# Patient Record
Sex: FEMALE | Race: WHITE | NOT HISPANIC OR LATINO
[De-identification: names, ages, dates, MRNs, and addresses within clinical notes are randomized per-mention and may not be internally consistent; named-entity substitution may affect disease eponyms.]

---

## 2017-05-14 ENCOUNTER — RECORD ABSTRACTING (OUTPATIENT)
Age: 72
End: 2017-05-14

## 2017-05-14 DIAGNOSIS — Z80.42 FAMILY HISTORY OF MALIGNANT NEOPLASM OF PROSTATE: ICD-10-CM

## 2017-05-14 DIAGNOSIS — Z92.89 PERSONAL HISTORY OF OTHER MEDICAL TREATMENT: ICD-10-CM

## 2017-05-15 ENCOUNTER — APPOINTMENT (OUTPATIENT)
Dept: INTERNAL MEDICINE | Facility: CLINIC | Age: 72
End: 2017-05-15

## 2017-05-15 ENCOUNTER — NON-APPOINTMENT (OUTPATIENT)
Age: 72
End: 2017-05-15

## 2017-05-15 VITALS
SYSTOLIC BLOOD PRESSURE: 126 MMHG | OXYGEN SATURATION: 97 % | WEIGHT: 178 LBS | HEIGHT: 64 IN | TEMPERATURE: 98.6 F | HEART RATE: 70 BPM | DIASTOLIC BLOOD PRESSURE: 80 MMHG | RESPIRATION RATE: 18 BRPM | BODY MASS INDEX: 30.39 KG/M2

## 2017-05-24 ENCOUNTER — APPOINTMENT (OUTPATIENT)
Dept: DERMATOLOGY | Facility: CLINIC | Age: 72
End: 2017-05-24

## 2017-05-24 VITALS — HEIGHT: 64 IN | WEIGHT: 175 LBS | BODY MASS INDEX: 29.88 KG/M2

## 2017-07-06 ENCOUNTER — MEDICATION RENEWAL (OUTPATIENT)
Age: 72
End: 2017-07-06

## 2017-07-06 DIAGNOSIS — M19.90 UNSPECIFIED OSTEOARTHRITIS, UNSPECIFIED SITE: ICD-10-CM

## 2017-07-25 ENCOUNTER — TRANSCRIPTION ENCOUNTER (OUTPATIENT)
Age: 72
End: 2017-07-25

## 2017-08-21 ENCOUNTER — OUTPATIENT (OUTPATIENT)
Dept: OUTPATIENT SERVICES | Facility: HOSPITAL | Age: 72
LOS: 1 days | End: 2017-08-21
Payer: MEDICARE

## 2017-08-21 ENCOUNTER — APPOINTMENT (OUTPATIENT)
Dept: MAMMOGRAPHY | Facility: CLINIC | Age: 72
End: 2017-08-21
Payer: MEDICARE

## 2017-08-21 DIAGNOSIS — Z00.8 ENCOUNTER FOR OTHER GENERAL EXAMINATION: ICD-10-CM

## 2017-08-21 PROCEDURE — 77067 SCR MAMMO BI INCL CAD: CPT

## 2017-08-21 PROCEDURE — 77063 BREAST TOMOSYNTHESIS BI: CPT

## 2017-08-21 PROCEDURE — G0202: CPT | Mod: 26

## 2017-08-21 PROCEDURE — 77063 BREAST TOMOSYNTHESIS BI: CPT | Mod: 26

## 2017-09-19 ENCOUNTER — TRANSCRIPTION ENCOUNTER (OUTPATIENT)
Age: 72
End: 2017-09-19

## 2017-09-21 ENCOUNTER — TRANSCRIPTION ENCOUNTER (OUTPATIENT)
Age: 72
End: 2017-09-21

## 2017-10-12 ENCOUNTER — RX RENEWAL (OUTPATIENT)
Age: 72
End: 2017-10-12

## 2017-10-13 ENCOUNTER — OTHER (OUTPATIENT)
Age: 72
End: 2017-10-13

## 2017-10-16 ENCOUNTER — NON-APPOINTMENT (OUTPATIENT)
Age: 72
End: 2017-10-16

## 2017-10-16 ENCOUNTER — APPOINTMENT (OUTPATIENT)
Dept: INTERNAL MEDICINE | Facility: CLINIC | Age: 72
End: 2017-10-16
Payer: MEDICARE

## 2017-10-16 VITALS
TEMPERATURE: 98.3 F | RESPIRATION RATE: 18 BRPM | BODY MASS INDEX: 29.02 KG/M2 | OXYGEN SATURATION: 98 % | WEIGHT: 170 LBS | HEART RATE: 86 BPM | DIASTOLIC BLOOD PRESSURE: 68 MMHG | SYSTOLIC BLOOD PRESSURE: 126 MMHG | HEIGHT: 64 IN

## 2017-10-16 PROCEDURE — 99214 OFFICE O/P EST MOD 30 MIN: CPT | Mod: 25

## 2017-10-16 PROCEDURE — 94010 BREATHING CAPACITY TEST: CPT

## 2017-12-05 LAB
CHOLEST SERPL-MCNC: 159
GLUCOSE SERPL-MCNC: 89
HBA1C MFR BLD HPLC: 5.5
HDLC SERPL-MCNC: 55
LDLC SERPL CALC-MCNC: 79

## 2017-12-20 ENCOUNTER — TRANSCRIPTION ENCOUNTER (OUTPATIENT)
Age: 72
End: 2017-12-20

## 2017-12-20 ENCOUNTER — RX RENEWAL (OUTPATIENT)
Age: 72
End: 2017-12-20

## 2017-12-23 ENCOUNTER — TRANSCRIPTION ENCOUNTER (OUTPATIENT)
Age: 72
End: 2017-12-23

## 2017-12-26 ENCOUNTER — RX RENEWAL (OUTPATIENT)
Age: 72
End: 2017-12-26

## 2018-03-26 ENCOUNTER — RX RENEWAL (OUTPATIENT)
Age: 73
End: 2018-03-26

## 2018-04-24 ENCOUNTER — RX RENEWAL (OUTPATIENT)
Age: 73
End: 2018-04-24

## 2018-05-02 ENCOUNTER — APPOINTMENT (OUTPATIENT)
Dept: DERMATOLOGY | Facility: CLINIC | Age: 73
End: 2018-05-02
Payer: MEDICARE

## 2018-05-02 VITALS — BODY MASS INDEX: 30.39 KG/M2 | HEIGHT: 64 IN | WEIGHT: 178 LBS

## 2018-05-02 PROCEDURE — 99213 OFFICE O/P EST LOW 20 MIN: CPT

## 2018-05-02 RX ORDER — LEVOTHYROXINE SODIUM 0.07 MG/1
75 TABLET ORAL DAILY
Qty: 90 | Refills: 0 | Status: DISCONTINUED | COMMUNITY
End: 2018-05-02

## 2018-05-07 ENCOUNTER — APPOINTMENT (OUTPATIENT)
Dept: DERMATOLOGY | Facility: CLINIC | Age: 73
End: 2018-05-07

## 2018-05-08 ENCOUNTER — TRANSCRIPTION ENCOUNTER (OUTPATIENT)
Age: 73
End: 2018-05-08

## 2018-05-14 ENCOUNTER — APPOINTMENT (OUTPATIENT)
Dept: DERMATOLOGY | Facility: CLINIC | Age: 73
End: 2018-05-14

## 2018-05-29 ENCOUNTER — LABORATORY RESULT (OUTPATIENT)
Age: 73
End: 2018-05-29

## 2018-05-29 ENCOUNTER — NON-APPOINTMENT (OUTPATIENT)
Age: 73
End: 2018-05-29

## 2018-05-29 ENCOUNTER — APPOINTMENT (OUTPATIENT)
Dept: INTERNAL MEDICINE | Facility: CLINIC | Age: 73
End: 2018-05-29
Payer: MEDICARE

## 2018-05-29 VITALS
OXYGEN SATURATION: 97 % | BODY MASS INDEX: 29.36 KG/M2 | HEART RATE: 74 BPM | TEMPERATURE: 98.9 F | DIASTOLIC BLOOD PRESSURE: 84 MMHG | SYSTOLIC BLOOD PRESSURE: 126 MMHG | HEIGHT: 64 IN | WEIGHT: 171.98 LBS | RESPIRATION RATE: 16 BRPM

## 2018-05-29 PROCEDURE — G0009: CPT

## 2018-05-29 PROCEDURE — 90732 PPSV23 VACC 2 YRS+ SUBQ/IM: CPT

## 2018-05-29 PROCEDURE — 94060 EVALUATION OF WHEEZING: CPT

## 2018-05-29 PROCEDURE — 99214 OFFICE O/P EST MOD 30 MIN: CPT | Mod: 25

## 2018-06-19 ENCOUNTER — RESULT REVIEW (OUTPATIENT)
Age: 73
End: 2018-06-19

## 2018-06-30 ENCOUNTER — LABORATORY RESULT (OUTPATIENT)
Age: 73
End: 2018-06-30

## 2018-07-02 LAB
APPEARANCE: CLEAR
BACTERIA UR CULT: NORMAL
BACTERIA: NEGATIVE
BILIRUBIN URINE: NEGATIVE
BLOOD URINE: NEGATIVE
COLOR: YELLOW
GLUCOSE QUALITATIVE U: NEGATIVE MG/DL
HYALINE CASTS: 1 /LPF
KETONES URINE: NEGATIVE
LEUKOCYTE ESTERASE URINE: NEGATIVE
MICROSCOPIC-UA: NORMAL
NITRITE URINE: NEGATIVE
PH URINE: 6.5
PROTEIN URINE: NEGATIVE MG/DL
RED BLOOD CELLS URINE: 2 /HPF
SPECIFIC GRAVITY URINE: 1.01
SQUAMOUS EPITHELIAL CELLS: 0 /HPF
UROBILINOGEN URINE: NEGATIVE MG/DL
WHITE BLOOD CELLS URINE: 0 /HPF

## 2018-07-03 ENCOUNTER — RX RENEWAL (OUTPATIENT)
Age: 73
End: 2018-07-03

## 2018-07-03 RX ORDER — ATORVASTATIN CALCIUM 20 MG/1
20 TABLET, FILM COATED ORAL
Qty: 90 | Refills: 0 | Status: DISCONTINUED | COMMUNITY
Start: 2017-12-20 | End: 2018-07-03

## 2018-07-03 RX ORDER — UBIDECARENONE 200 MG
200 CAPSULE ORAL
Refills: 0 | Status: ACTIVE | COMMUNITY

## 2018-08-02 ENCOUNTER — OUTPATIENT (OUTPATIENT)
Dept: OUTPATIENT SERVICES | Facility: HOSPITAL | Age: 73
LOS: 1 days | End: 2018-08-02
Payer: MEDICARE

## 2018-08-02 ENCOUNTER — APPOINTMENT (OUTPATIENT)
Dept: ULTRASOUND IMAGING | Facility: CLINIC | Age: 73
End: 2018-08-02
Payer: MEDICARE

## 2018-08-02 DIAGNOSIS — Z00.8 ENCOUNTER FOR OTHER GENERAL EXAMINATION: ICD-10-CM

## 2018-08-02 PROCEDURE — 76700 US EXAM ABDOM COMPLETE: CPT | Mod: 26

## 2018-08-02 PROCEDURE — 76700 US EXAM ABDOM COMPLETE: CPT

## 2018-08-08 LAB
ALBUMIN SERPL ELPH-MCNC: 4.4 G/DL
ALP BLD-CCNC: 72 U/L
ALT SERPL-CCNC: 19 U/L
AST SERPL-CCNC: 22 U/L
BILIRUB DIRECT SERPL-MCNC: 0.1 MG/DL
BILIRUB INDIRECT SERPL-MCNC: 0.3 MG/DL
BILIRUB SERPL-MCNC: 0.4 MG/DL
CHOLEST SERPL-MCNC: 160 MG/DL
CHOLEST/HDLC SERPL: 3.1 RATIO
HDLC SERPL-MCNC: 52 MG/DL
LDLC SERPL CALC-MCNC: 79 MG/DL
PROT SERPL-MCNC: 7.4 G/DL
TRIGL SERPL-MCNC: 145 MG/DL

## 2018-08-20 ENCOUNTER — RX RENEWAL (OUTPATIENT)
Age: 73
End: 2018-08-20

## 2018-08-24 ENCOUNTER — OUTPATIENT (OUTPATIENT)
Dept: OUTPATIENT SERVICES | Facility: HOSPITAL | Age: 73
LOS: 1 days | End: 2018-08-24
Payer: MEDICARE

## 2018-08-24 ENCOUNTER — APPOINTMENT (OUTPATIENT)
Dept: MAMMOGRAPHY | Facility: CLINIC | Age: 73
End: 2018-08-24
Payer: MEDICARE

## 2018-08-24 DIAGNOSIS — Z00.8 ENCOUNTER FOR OTHER GENERAL EXAMINATION: ICD-10-CM

## 2018-08-24 PROCEDURE — 77063 BREAST TOMOSYNTHESIS BI: CPT

## 2018-08-24 PROCEDURE — 77067 SCR MAMMO BI INCL CAD: CPT | Mod: 26

## 2018-08-24 PROCEDURE — 77067 SCR MAMMO BI INCL CAD: CPT

## 2018-08-24 PROCEDURE — 77063 BREAST TOMOSYNTHESIS BI: CPT | Mod: 26

## 2018-08-29 ENCOUNTER — TRANSCRIPTION ENCOUNTER (OUTPATIENT)
Age: 73
End: 2018-08-29

## 2018-09-06 ENCOUNTER — APPOINTMENT (OUTPATIENT)
Dept: UROLOGY | Facility: CLINIC | Age: 73
End: 2018-09-06
Payer: MEDICARE

## 2018-09-06 ENCOUNTER — FORM ENCOUNTER (OUTPATIENT)
Age: 73
End: 2018-09-06

## 2018-09-06 VITALS
WEIGHT: 173 LBS | TEMPERATURE: 98.7 F | DIASTOLIC BLOOD PRESSURE: 90 MMHG | HEART RATE: 85 BPM | HEIGHT: 64 IN | BODY MASS INDEX: 29.53 KG/M2 | SYSTOLIC BLOOD PRESSURE: 166 MMHG

## 2018-09-06 DIAGNOSIS — Z63.4 DISAPPEARANCE AND DEATH OF FAMILY MEMBER: ICD-10-CM

## 2018-09-06 PROCEDURE — 99204 OFFICE O/P NEW MOD 45 MIN: CPT

## 2018-09-06 SDOH — SOCIAL STABILITY - SOCIAL INSECURITY: DISSAPEARANCE AND DEATH OF FAMILY MEMBER: Z63.4

## 2018-09-07 ENCOUNTER — OUTPATIENT (OUTPATIENT)
Dept: OUTPATIENT SERVICES | Facility: HOSPITAL | Age: 73
LOS: 1 days | End: 2018-09-07
Payer: MEDICARE

## 2018-09-07 ENCOUNTER — APPOINTMENT (OUTPATIENT)
Dept: CT IMAGING | Facility: CLINIC | Age: 73
End: 2018-09-07
Payer: MEDICARE

## 2018-09-07 DIAGNOSIS — Z00.8 ENCOUNTER FOR OTHER GENERAL EXAMINATION: ICD-10-CM

## 2018-09-07 LAB
APPEARANCE: CLEAR
BACTERIA: NEGATIVE
BILIRUBIN URINE: NEGATIVE
BLOOD URINE: NEGATIVE
COLOR: YELLOW
GLUCOSE QUALITATIVE U: NEGATIVE MG/DL
KETONES URINE: NEGATIVE
LEUKOCYTE ESTERASE URINE: ABNORMAL
MICROSCOPIC-UA: NORMAL
NITRITE URINE: NEGATIVE
PH URINE: 5.5
PROTEIN URINE: NEGATIVE MG/DL
RED BLOOD CELLS URINE: 0 /HPF
SPECIFIC GRAVITY URINE: 1.01
SQUAMOUS EPITHELIAL CELLS: 2 /HPF
UROBILINOGEN URINE: NEGATIVE MG/DL
WHITE BLOOD CELLS URINE: 2 /HPF

## 2018-09-07 PROCEDURE — 74178 CT ABD&PLV WO CNTR FLWD CNTR: CPT

## 2018-09-07 PROCEDURE — 82565 ASSAY OF CREATININE: CPT

## 2018-09-07 PROCEDURE — 74178 CT ABD&PLV WO CNTR FLWD CNTR: CPT | Mod: 26

## 2018-09-09 PROBLEM — Z63.4 WIDOWED: Status: ACTIVE | Noted: 2018-09-06

## 2018-09-09 RX ORDER — PHENAZOPYRIDINE HYDROCHLORIDE 200 MG/1
200 TABLET ORAL
Qty: 6 | Refills: 0 | Status: COMPLETED | COMMUNITY
Start: 2018-08-29

## 2018-09-09 RX ORDER — NITROFURANTOIN (MONOHYDRATE/MACROCRYSTALS) 25; 75 MG/1; MG/1
100 CAPSULE ORAL
Qty: 14 | Refills: 0 | Status: COMPLETED | COMMUNITY
Start: 2018-08-29

## 2018-09-09 RX ORDER — SODIUM SULFATE, POTASSIUM SULFATE, MAGNESIUM SULFATE 17.5; 3.13; 1.6 G/ML; G/ML; G/ML
17.5-3.13-1.6 SOLUTION, CONCENTRATE ORAL
Qty: 354 | Refills: 0 | Status: COMPLETED | COMMUNITY
Start: 2018-07-25

## 2018-09-11 ENCOUNTER — CHART COPY (OUTPATIENT)
Age: 73
End: 2018-09-11

## 2018-09-11 LAB — BACTERIA UR CULT: NORMAL

## 2018-09-24 ENCOUNTER — APPOINTMENT (OUTPATIENT)
Dept: UROLOGY | Facility: CLINIC | Age: 73
End: 2018-09-24
Payer: MEDICARE

## 2018-09-24 VITALS
SYSTOLIC BLOOD PRESSURE: 152 MMHG | OXYGEN SATURATION: 100 % | WEIGHT: 170 LBS | HEART RATE: 84 BPM | HEIGHT: 64 IN | BODY MASS INDEX: 29.02 KG/M2 | DIASTOLIC BLOOD PRESSURE: 79 MMHG

## 2018-09-24 PROCEDURE — 52000 CYSTOURETHROSCOPY: CPT

## 2018-11-19 ENCOUNTER — RX RENEWAL (OUTPATIENT)
Age: 73
End: 2018-11-19

## 2018-12-01 ENCOUNTER — LABORATORY RESULT (OUTPATIENT)
Age: 73
End: 2018-12-01

## 2018-12-05 ENCOUNTER — APPOINTMENT (OUTPATIENT)
Dept: RHEUMATOLOGY | Facility: CLINIC | Age: 73
End: 2018-12-05
Payer: MEDICARE

## 2018-12-05 VITALS
OXYGEN SATURATION: 99 % | WEIGHT: 170 LBS | BODY MASS INDEX: 29.02 KG/M2 | DIASTOLIC BLOOD PRESSURE: 80 MMHG | SYSTOLIC BLOOD PRESSURE: 130 MMHG | HEART RATE: 75 BPM | HEIGHT: 64 IN

## 2018-12-05 PROCEDURE — 99204 OFFICE O/P NEW MOD 45 MIN: CPT

## 2018-12-06 LAB
C3 SERPL-MCNC: 150 MG/DL
C4 SERPL-MCNC: 69 MG/DL
CCP AB SER IA-ACNC: <8 UNITS
CRP SERPL-MCNC: 0.26 MG/DL
DSDNA AB SER-ACNC: <12 IU/ML
ENA RNP AB SER IA-ACNC: <0.2 AL
ENA SM AB SER IA-ACNC: <0.2 AL
ENA SS-A AB SER IA-ACNC: <0.2 AL
ENA SS-B AB SER IA-ACNC: <0.2 AL
ERYTHROCYTE [SEDIMENTATION RATE] IN BLOOD BY WESTERGREN METHOD: 12 MM/HR
RF+CCP IGG SER-IMP: NEGATIVE
RHEUMATOID FACT SER QL: <10 IU/ML
THYROGLOB AB SERPL-ACNC: <20 IU/ML
THYROPEROXIDASE AB SERPL IA-ACNC: 22.6 IU/ML

## 2018-12-09 LAB
ANA PAT FLD IF-IMP: ABNORMAL
ANA SER IF-ACNC: ABNORMAL

## 2018-12-10 ENCOUNTER — APPOINTMENT (OUTPATIENT)
Dept: INTERNAL MEDICINE | Facility: CLINIC | Age: 73
End: 2018-12-10

## 2018-12-10 LAB
APPEARANCE: CLEAR
BACTERIA UR CULT: NORMAL
BACTERIA: NEGATIVE
BILIRUBIN URINE: NEGATIVE
BLOOD URINE: NEGATIVE
COLOR: YELLOW
GLUCOSE QUALITATIVE U: NEGATIVE MG/DL
HYALINE CASTS: 4 /LPF
KETONES URINE: NEGATIVE
LEUKOCYTE ESTERASE URINE: ABNORMAL
MICROSCOPIC-UA: NORMAL
NITRITE URINE: NEGATIVE
PH URINE: 5.5
PROTEIN URINE: NEGATIVE MG/DL
RED BLOOD CELLS URINE: 5 /HPF
SPECIFIC GRAVITY URINE: 1.02
SQUAMOUS EPITHELIAL CELLS: 4 /HPF
UROBILINOGEN URINE: NEGATIVE MG/DL
WHITE BLOOD CELLS URINE: 13 /HPF

## 2018-12-13 ENCOUNTER — APPOINTMENT (OUTPATIENT)
Dept: INTERNAL MEDICINE | Facility: CLINIC | Age: 73
End: 2018-12-13
Payer: MEDICARE

## 2018-12-13 VITALS
SYSTOLIC BLOOD PRESSURE: 140 MMHG | HEART RATE: 82 BPM | TEMPERATURE: 98.7 F | RESPIRATION RATE: 16 BRPM | DIASTOLIC BLOOD PRESSURE: 70 MMHG | HEIGHT: 64 IN | WEIGHT: 168 LBS | BODY MASS INDEX: 28.68 KG/M2 | OXYGEN SATURATION: 98 %

## 2018-12-13 PROCEDURE — 99214 OFFICE O/P EST MOD 30 MIN: CPT

## 2018-12-13 RX ORDER — MELOXICAM 7.5 MG/1
7.5 TABLET ORAL DAILY
Qty: 90 | Refills: 1 | Status: DISCONTINUED | COMMUNITY
Start: 2017-12-20 | End: 2018-12-13

## 2018-12-13 RX ORDER — LEVOTHYROXINE SODIUM 75 UG/1
75 TABLET ORAL
Refills: 0 | Status: DISCONTINUED | COMMUNITY
End: 2018-12-13

## 2018-12-13 NOTE — HISTORY OF PRESENT ILLNESS
[FreeTextEntry1] : Followup evaluation [de-identified] : Mrs. royal presents for followup evaluation. She is feeling well. She recently had extensive blood work by rheumatology. Previous comprehensive blood profile did show evidence of asymptomatic pyuria. Urine culture was negative. Mrs. royal did have a urology evaluation by Dr. Aguila which was negative. She has no chest pain, shortness of breath or palpitations. There were no nocturnal symptoms of cough or dyspnea. She has already received the flu shot.

## 2018-12-13 NOTE — PLAN
[FreeTextEntry1] : Mrs. royal presents for a followup evaluation. She does have asymptomatic pyuria. I will discuss this with Dr. Aguila, however, at present she will not require treatment. Neurologic workup was essentially negative and was reviewed with the patient. She will continue on her current medication regimen. Followup in 6 months with repeat lipid profile, TSH level and comprehensive metabolic profile. Prescriptions can be renewed for a [facility] in [place] as needed.

## 2018-12-21 ENCOUNTER — TRANSCRIPTION ENCOUNTER (OUTPATIENT)
Age: 73
End: 2018-12-21

## 2019-02-14 ENCOUNTER — MEDICATION RENEWAL (OUTPATIENT)
Age: 74
End: 2019-02-14

## 2019-02-19 ENCOUNTER — RX RENEWAL (OUTPATIENT)
Age: 74
End: 2019-02-19

## 2019-03-01 ENCOUNTER — TRANSCRIPTION ENCOUNTER (OUTPATIENT)
Age: 74
End: 2019-03-01

## 2019-05-06 ENCOUNTER — MEDICATION RENEWAL (OUTPATIENT)
Age: 74
End: 2019-05-06

## 2019-05-08 ENCOUNTER — TRANSCRIPTION ENCOUNTER (OUTPATIENT)
Age: 74
End: 2019-05-08

## 2019-05-16 ENCOUNTER — LABORATORY RESULT (OUTPATIENT)
Age: 74
End: 2019-05-16

## 2019-05-20 ENCOUNTER — APPOINTMENT (OUTPATIENT)
Dept: UROLOGY | Facility: CLINIC | Age: 74
End: 2019-05-20
Payer: MEDICARE

## 2019-05-20 VITALS
HEIGHT: 64 IN | DIASTOLIC BLOOD PRESSURE: 80 MMHG | WEIGHT: 168 LBS | OXYGEN SATURATION: 98 % | TEMPERATURE: 98.6 F | HEART RATE: 84 BPM | SYSTOLIC BLOOD PRESSURE: 147 MMHG | BODY MASS INDEX: 28.68 KG/M2

## 2019-05-20 PROCEDURE — 99213 OFFICE O/P EST LOW 20 MIN: CPT

## 2019-05-21 LAB
APPEARANCE: CLEAR
BACTERIA: NEGATIVE
BILIRUBIN URINE: NEGATIVE
BLOOD URINE: NEGATIVE
COLOR: COLORLESS
GLUCOSE QUALITATIVE U: NEGATIVE
HYALINE CASTS: 0 /LPF
KETONES URINE: NEGATIVE
LEUKOCYTE ESTERASE URINE: ABNORMAL
MICROSCOPIC-UA: NORMAL
NITRITE URINE: NEGATIVE
PH URINE: 5.5
PROTEIN URINE: NEGATIVE
RED BLOOD CELLS URINE: 1 /HPF
SPECIFIC GRAVITY URINE: 1.01
SQUAMOUS EPITHELIAL CELLS: 1 /HPF
UROBILINOGEN URINE: NORMAL
WHITE BLOOD CELLS URINE: 3 /HPF

## 2019-05-21 RX ORDER — METRONIDAZOLE 500 MG/1
500 TABLET ORAL
Qty: 30 | Refills: 0 | Status: COMPLETED | COMMUNITY
Start: 2018-12-20

## 2019-05-21 RX ORDER — SULFAMETHOXAZOLE AND TRIMETHOPRIM 800; 160 MG/1; MG/1
800-160 TABLET ORAL
Qty: 10 | Refills: 0 | Status: COMPLETED | COMMUNITY
Start: 2019-04-16

## 2019-05-21 NOTE — PROCEDURE
[Cystoscopy] : cystoscopy [Patient] : the patient [Consent Obtained] : written consent was obtained prior to the procedure and is detailed in the patient's record [Last Aspirin Dose ____] : Reviewed last aspirin dose: [unfilled] [Allergies Reviewed] : Allergies reviewed [Pt took necessary Preparations and Antibiotics for Procedure] : pt took necessary preparations and antibiotics for procedure [Valves/Prosthetics ___] : Pt has the following valves and/or prosthetics: [unfilled] [Time Patient Entered Room ___] : patient entered room: [unfilled] [Time Out ___] : time out occurred at [unfilled] as per policy [Procedure Start Time ___] : procedure start time: [unfilled] [Procedure Stop Time ___] : procedure stop time: [unfilled] [Time Patient Exited Room ___] : patient exited room: [unfilled] [Supine] : supine [Betadine] : with betadine [Intraurethral 2% Lidocaine Gel ___ (cc)] : [unfilled] cc of 2% Lidocaine Gel was administered intraurethrally [Flexible Cystoscope] : A flexible cystoscope was used to visualize the urethra and bladder. [Normal] : was normal [No Bladder Tumor] : No bladder tumor visualized [Instrumented] : an instrumented [Cytology] : cytology [No Complications] : There were no complications [Tolerated Well] : the patient tolerated the procedure well [] : not  present in the bladder wall [de-identified] : no trabeculation

## 2019-05-21 NOTE — HISTORY OF PRESENT ILLNESS
[FreeTextEntry1] : 73 year old woman with complaint of gross hematuria. She has been told she had blood on urinalyses for years, but a week ago she had pink urine.  It is mild in severity as it resolved on its own. Nothing makes the symptoms better, nothing makes sx worse. It is associated with nothing.\par No current hematuria, no dysuria, no frequency, no urgency, no hesitancy, no straining. No incontinence. \par No fevers, no chills, no nausea, no vomiting, no flank pain. No family history contributory to hematuria.\par \par 05/20/2019: Patient presents for follow up. She reports several UTIs since last visit. These occurred in [place]. Symptoms were dysuria and SP discomfort. No  symptoms now and other medical  issues remain unchanged from above. No hematuria, no dysuria, no frequency, no urgency, no hesitancy, no straining. No incontinence. No fevers, no chills, no nausea, no vomiting, no flank pain.

## 2019-05-21 NOTE — DISCUSSION/SUMMARY
[FreeTextEntry1] : 73 year old F with gross hematuria, now s/p negative CTU and cystoscopy. Will follow up urine cytology. If this is unremarkable, patient will return every 6 months for repeat urine studies. If patient obtains negative urine studies x2 consecutive, will follow up PRN. If RBCs in UA persist, will repeat AMH work up in 2-3 years per AUA guidelines. Pt understands.

## 2019-05-21 NOTE — ASSESSMENT
[FreeTextEntry1] : 73 year old woman with h/o of gross hematuria, s/p negative CTU and cystoscopy 9/2018. Recent UCx negative, UA showed only LE and WBCs. Possibly residual irritation from treated infection. No acute intervention required at this time. \par \par Patient was counselled on behavioral modifications to reduce her risk of urinary tract infections including wiping front to back, good general hygene, voiding following sex, avoiding douching and spermacides. We discussed cranberry supplements which may reduce UTI, though the data is equivocal. We also discussed different antibiotic therapy including low dose antibiotic suppression, post coital prophylaxis, and self start therapy. Finally, we discussed reducing UTI recurrence with topical vaginal estrogen cream. Pt opts for self start therapy.

## 2019-05-21 NOTE — REVIEW OF SYSTEMS
[both] : pain during and after intercourse [denies] : denies pain with orgasm [Negative] : Heme/Lymph [Heartburn] : heartburn [Blood in urine that you can see] : blood visible in urine [Wake up at night to urinate  How many times?  ___] : wakes up to urinate [unfilled] times during the night [Leakage of urine with straining, coughing, laughing] : leakage of urine with straining, coughing, laughing [Joint Swelling] : joint swelling [Anxiety] : anxiety

## 2019-05-23 LAB — BACTERIA UR CULT: NORMAL

## 2019-06-05 ENCOUNTER — FORM ENCOUNTER (OUTPATIENT)
Age: 74
End: 2019-06-05

## 2019-06-06 ENCOUNTER — APPOINTMENT (OUTPATIENT)
Dept: RHEUMATOLOGY | Facility: CLINIC | Age: 74
End: 2019-06-06
Payer: MEDICARE

## 2019-06-06 ENCOUNTER — APPOINTMENT (OUTPATIENT)
Dept: INTERNAL MEDICINE | Facility: CLINIC | Age: 74
End: 2019-06-06

## 2019-06-06 ENCOUNTER — APPOINTMENT (OUTPATIENT)
Dept: INTERNAL MEDICINE | Facility: CLINIC | Age: 74
End: 2019-06-06
Payer: MEDICARE

## 2019-06-06 ENCOUNTER — APPOINTMENT (OUTPATIENT)
Dept: RADIOLOGY | Facility: CLINIC | Age: 74
End: 2019-06-06
Payer: MEDICARE

## 2019-06-06 ENCOUNTER — OUTPATIENT (OUTPATIENT)
Dept: OUTPATIENT SERVICES | Facility: HOSPITAL | Age: 74
LOS: 1 days | End: 2019-06-06
Payer: MEDICARE

## 2019-06-06 VITALS
SYSTOLIC BLOOD PRESSURE: 132 MMHG | TEMPERATURE: 99.1 F | RESPIRATION RATE: 18 BRPM | DIASTOLIC BLOOD PRESSURE: 70 MMHG | WEIGHT: 173 LBS | OXYGEN SATURATION: 97 % | HEART RATE: 91 BPM | HEIGHT: 65 IN | BODY MASS INDEX: 28.82 KG/M2

## 2019-06-06 VITALS
HEART RATE: 85 BPM | WEIGHT: 70 LBS | DIASTOLIC BLOOD PRESSURE: 68 MMHG | HEIGHT: 64 IN | SYSTOLIC BLOOD PRESSURE: 118 MMHG | BODY MASS INDEX: 11.95 KG/M2 | OXYGEN SATURATION: 98 %

## 2019-06-06 DIAGNOSIS — Z87.448 PERSONAL HISTORY OF OTHER DISEASES OF URINARY SYSTEM: ICD-10-CM

## 2019-06-06 DIAGNOSIS — Z00.8 ENCOUNTER FOR OTHER GENERAL EXAMINATION: ICD-10-CM

## 2019-06-06 PROCEDURE — 73620 X-RAY EXAM OF FOOT: CPT

## 2019-06-06 PROCEDURE — 73620 X-RAY EXAM OF FOOT: CPT | Mod: 26,LT

## 2019-06-06 PROCEDURE — 99214 OFFICE O/P EST MOD 30 MIN: CPT

## 2019-06-06 RX ORDER — CIPROFLOXACIN HYDROCHLORIDE 500 MG/1
500 TABLET, FILM COATED ORAL TWICE DAILY
Qty: 10 | Refills: 3 | Status: DISCONTINUED | COMMUNITY
Start: 2019-05-20 | End: 2019-06-06

## 2019-06-06 NOTE — HISTORY OF PRESENT ILLNESS
[FreeTextEntry1] : This is a 74 y/o woman with hx including hypothyroidism, HLD, osteopenia, osteoarthritis, Torn menisucs left side 10 years ago with orthopedic repair, who presents for evaluation of a +CHRIS 1: homogenous . \par \par The CHRIS was drawn as part of liver workup.\par Liver tests normalized after some medication changes.  \par She was noted to also have a ferritin in the 400's range.  She denies any fevers, rash.  \par \par  She has no active complaints today except some noted swelling in left 5th toe.  No hx of psoriasis. She takes advil as needed which helps.  Pain level is 1/10 in intensity with 5 minutes of AM stiffness.  Denies fatigue, or abdominal/GI issues.  She is satisfied with her ability to do most activities.  \par Has occasional muscle pains, usually after lifting.  Reports she uses her husbands  flexeril 10mg PRN.  Denies excessive tireeness after taking it.  \par \par She prefers to hold off on Reclast as she said her fracture in wrist was due to very heavy metal clothes rack falling on her wrist rather than falling and fracturing.  She would like to see what her next DEXA shows.  She is due 2019.\par No falls/fractures since last visit.  \par Vit D 50.8 18\par \par 18\par CHRIS 1:160 homogenous\par ASMA 1:20. \par 18\par CHRIS 1:80 homogenous\par RF/CCP/DSDNA/DEEDEE/Sjogren's Ab WNL\par ESR/CRP WNL\par \par She had had some hematuria.  Had US/CT abd, negative.  Cystoscpy was negative.  \par \par She was told by her GYN that she has osteopenia.  \par Hysterectomy 39, one ovary\par No chronic steroid use\par right wrist fracture 2 years ago.\par Her GYN offered treatment for the osteopenia, but patient declined.  \par Reports occ heartburn\par \par \par The patient denies fevers, chills, hair loss, photosensitivity, oral sores, dry eyes, dry mouth, rashes, pleuritic chest pain, SOB, abdominal pain, LE edema, or triphasic color changes in the hands/feet, joint pain, joint stiffness, or joint swelling. Patient denies skin tightening, dysphagia.  Patient denies muscle weakness/muscle pain. Denies hx of eye inflammation.  No history of blood clots or miscarriages.\par \par \par Soc:   with AS , passed due to C. dif.

## 2019-06-06 NOTE — ASSESSMENT
[FreeTextEntry1] : This is a 74 y/o woman with hx including hypothyoridism, HLD, osteopenia, osteoarthritis, Torn menisucs left side 10 years ago with orthopedic repair, who presents for f/u of a +CHRIS 1: homogenous, osteopenia, joint pain.\par The CHRIS was drawn as part of liver workup. Liver tests have normalized. At present, she has a paucity of clinical findings to support a diagnosis of CHRIS related rheumatic conditions such as Lupus, Sjogren's, Mixed Connective Tissue Disease of Scleroderma. \par She prefers to hold off on Reclast due to concern for jaw necrosis and other side effects.  I informed her we can monitor DEXA and if worsened we can reconsider Reclast.  She has not had any fractures since last visit.  \par \par Plan:\par +CHRIS\par -low suspicion for CHRIS related CTD.\par -continue clinical surveillance \par \par Osteopenia- DEXa with T score -1.7. Has right wrist fracture 2 years ago. My calculated FRAX is 18/3.4%\par -I informed her that in these cases, with this FRAX score, she should be on treatment to prevent osteoporosis. \par -I have discussed Reclast with her, as she has hx of acid reflux. \par -She would like to hold off on Reclast for now and see next DEXA\par -Potential side effects including but not limited to flu like symptoms 24-72 hours after infusion, hypocalcemia, jaw necrosis, and atypical femoral fractures, were discussed.\par -Vit D level is excellent\par -DEXA for 19\par \par Left 5th toe swelling- ?PsA vs. shoe related swelling.  ESR/CRP normal, but C4 notably elevated.  No significant AM stiffness.  Pain is worse upon walking. \par -no hx of psoriasis\par -XR left foot\par -advil as needed, as she states this typically helps.\par \par Myalgia\par -She is using her 's  flexeril for muscle pains\par -Rx flexeril 10mg QHS PRN\par \par \par f/u with me after DEXA obtained in 2019. To make f/u for early 2019\par \par  \par

## 2019-06-06 NOTE — HISTORY OF PRESENT ILLNESS
[de-identified] : Mrs. royal presents for followup evaluation. She is feeling well. She denies any chest pain, shortness of breath palpitations. She has no nocturnal symptoms of cough or dyspnea. She continues on her current medication regimen. Mrs. royal has a history of abnormal urinalysis in the past. She had an evaluation by Dr. Aguila. She has had episodes of asymptomatic pyuria which did not require treatment. [FreeTextEntry1] : joint pain

## 2019-06-06 NOTE — PLAN
[FreeTextEntry1] : Mrs. Flores presents for a followup evaluation. She will continue her current medication regimen. Pursue his statin has been new. Patient has been given a prescription for comprehensive blood profile be done in 6 months prior to her next appointment.

## 2019-06-11 ENCOUNTER — MEDICATION RENEWAL (OUTPATIENT)
Age: 74
End: 2019-06-11

## 2019-06-13 ENCOUNTER — APPOINTMENT (OUTPATIENT)
Dept: INTERNAL MEDICINE | Facility: CLINIC | Age: 74
End: 2019-06-13

## 2019-07-16 ENCOUNTER — RX RENEWAL (OUTPATIENT)
Age: 74
End: 2019-07-16

## 2019-07-27 ENCOUNTER — TRANSCRIPTION ENCOUNTER (OUTPATIENT)
Age: 74
End: 2019-07-27

## 2019-08-06 ENCOUNTER — OUTPATIENT (OUTPATIENT)
Dept: OUTPATIENT SERVICES | Facility: HOSPITAL | Age: 74
LOS: 1 days | End: 2019-08-06
Payer: MEDICARE

## 2019-08-06 ENCOUNTER — APPOINTMENT (OUTPATIENT)
Dept: ULTRASOUND IMAGING | Facility: CLINIC | Age: 74
End: 2019-08-06
Payer: MEDICARE

## 2019-08-06 DIAGNOSIS — Z00.8 ENCOUNTER FOR OTHER GENERAL EXAMINATION: ICD-10-CM

## 2019-08-06 PROCEDURE — 76536 US EXAM OF HEAD AND NECK: CPT

## 2019-08-06 PROCEDURE — 76536 US EXAM OF HEAD AND NECK: CPT | Mod: 26

## 2019-08-26 ENCOUNTER — APPOINTMENT (OUTPATIENT)
Dept: MAMMOGRAPHY | Facility: CLINIC | Age: 74
End: 2019-08-26
Payer: MEDICARE

## 2019-08-26 ENCOUNTER — OUTPATIENT (OUTPATIENT)
Dept: OUTPATIENT SERVICES | Facility: HOSPITAL | Age: 74
LOS: 1 days | End: 2019-08-26
Payer: MEDICARE

## 2019-08-26 DIAGNOSIS — Z00.8 ENCOUNTER FOR OTHER GENERAL EXAMINATION: ICD-10-CM

## 2019-08-26 DIAGNOSIS — Z12.31 ENCOUNTER FOR SCREENING MAMMOGRAM FOR MALIGNANT NEOPLASM OF BREAST: ICD-10-CM

## 2019-08-26 DIAGNOSIS — Z00.00 ENCOUNTER FOR GENERAL ADULT MEDICAL EXAMINATION WITHOUT ABNORMAL FINDINGS: ICD-10-CM

## 2019-08-26 PROCEDURE — 77067 SCR MAMMO BI INCL CAD: CPT

## 2019-08-26 PROCEDURE — 77063 BREAST TOMOSYNTHESIS BI: CPT | Mod: 26

## 2019-08-26 PROCEDURE — 77067 SCR MAMMO BI INCL CAD: CPT | Mod: 26

## 2019-08-26 PROCEDURE — 77063 BREAST TOMOSYNTHESIS BI: CPT

## 2019-09-02 ENCOUNTER — FORM ENCOUNTER (OUTPATIENT)
Age: 74
End: 2019-09-02

## 2019-09-03 ENCOUNTER — APPOINTMENT (OUTPATIENT)
Dept: RADIOLOGY | Facility: CLINIC | Age: 74
End: 2019-09-03
Payer: MEDICARE

## 2019-09-03 ENCOUNTER — OUTPATIENT (OUTPATIENT)
Dept: OUTPATIENT SERVICES | Facility: HOSPITAL | Age: 74
LOS: 1 days | End: 2019-09-03
Payer: MEDICARE

## 2019-09-03 DIAGNOSIS — M85.80 OTHER SPECIFIED DISORDERS OF BONE DENSITY AND STRUCTURE, UNSPECIFIED SITE: ICD-10-CM

## 2019-09-03 DIAGNOSIS — M79.89 OTHER SPECIFIED SOFT TISSUE DISORDERS: ICD-10-CM

## 2019-09-03 PROCEDURE — 77080 DXA BONE DENSITY AXIAL: CPT | Mod: 26

## 2019-09-03 PROCEDURE — 77080 DXA BONE DENSITY AXIAL: CPT

## 2019-09-05 ENCOUNTER — APPOINTMENT (OUTPATIENT)
Dept: RHEUMATOLOGY | Facility: CLINIC | Age: 74
End: 2019-09-05
Payer: MEDICARE

## 2019-09-05 VITALS
OXYGEN SATURATION: 97 % | BODY MASS INDEX: 28.66 KG/M2 | HEART RATE: 74 BPM | DIASTOLIC BLOOD PRESSURE: 77 MMHG | HEIGHT: 65 IN | SYSTOLIC BLOOD PRESSURE: 119 MMHG | WEIGHT: 172 LBS

## 2019-09-05 PROCEDURE — 99214 OFFICE O/P EST MOD 30 MIN: CPT

## 2019-09-05 NOTE — ASSESSMENT
[FreeTextEntry1] : This is a 73 y/o woman with hx including hypothyoridism, HLD, osteopenia, osteoarthritis, Torn menisucs left side 10 years ago with orthopedic repair, who presents for f/u of a +CHRIS 1: homogenous, osteopenia, joint pain.\par The CHRIS was drawn as part of liver workup. Liver tests have normalized. At present, she has a paucity of clinical findings to support a diagnosis of CHRIS related rheumatic conditions such as Lupus, Sjogren's, Mixed Connective Tissue Disease of Scleroderma. \par She is now open to Reclast.  \par \par Plan:\par +CHRIS\par -low suspicion for CHRIS related CTD.\par -continue clinical surveillance \par \par Osteopenia- DEXa with T score -1.5. Has right wrist fracture 2 years ago.  FRAX hip fracture 10 years 3.6%.  \par -I informed her that in these cases, with this FRAX score, she should be on treatment to prevent osteoporosis. \par -I have discussed Reclast with her, as she has hx of acid reflux. She is now open.  \par -Potential side effects including but not limited to flu like symptoms 24-72 hours after infusion, hypocalcemia, jaw necrosis, and atypical femoral fractures, were discussed.\par -Vit D level is excellent\par -DEXA 9/19 results as above.  Next due in 2 years.  \par \par Left 5th toe swelling- PsA vs. reltaed to shoe.  This has improved.  Does not want any additional treatment.  \par -no hx of psoriasis\par -XR left foot 6/6/19 reviewed.  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  Thick posterior and small plantar calcaneal enthesophytes.  \par -advil as needed, as she states this typically helps.\par \par Myalgia\par -Wants to hold off on muscle relaxant refill for now.  \par \par \par f/u 2 years, sooner if needed.

## 2019-09-06 ENCOUNTER — APPOINTMENT (OUTPATIENT)
Dept: RHEUMATOLOGY | Facility: CLINIC | Age: 74
End: 2019-09-06
Payer: MEDICARE

## 2019-09-06 VITALS
DIASTOLIC BLOOD PRESSURE: 78 MMHG | OXYGEN SATURATION: 99 % | SYSTOLIC BLOOD PRESSURE: 125 MMHG | HEART RATE: 75 BPM | RESPIRATION RATE: 15 BRPM

## 2019-09-06 VITALS
TEMPERATURE: 98.9 F | OXYGEN SATURATION: 99 % | HEART RATE: 78 BPM | SYSTOLIC BLOOD PRESSURE: 128 MMHG | RESPIRATION RATE: 16 BRPM | DIASTOLIC BLOOD PRESSURE: 79 MMHG

## 2019-09-06 PROCEDURE — 96374 THER/PROPH/DIAG INJ IV PUSH: CPT

## 2019-09-06 RX ORDER — ZOLEDRONIC ACID 5 MG/100ML
5 INJECTION INTRAVENOUS
Qty: 0 | Refills: 0 | Status: COMPLETED | OUTPATIENT
Start: 2019-09-05

## 2019-09-30 ENCOUNTER — MEDICATION RENEWAL (OUTPATIENT)
Age: 74
End: 2019-09-30

## 2019-10-02 ENCOUNTER — TRANSCRIPTION ENCOUNTER (OUTPATIENT)
Age: 74
End: 2019-10-02

## 2019-11-25 ENCOUNTER — APPOINTMENT (OUTPATIENT)
Dept: UROLOGY | Facility: CLINIC | Age: 74
End: 2019-11-25
Payer: MEDICARE

## 2019-11-25 VITALS
SYSTOLIC BLOOD PRESSURE: 166 MMHG | OXYGEN SATURATION: 98 % | RESPIRATION RATE: 16 BRPM | HEART RATE: 83 BPM | DIASTOLIC BLOOD PRESSURE: 88 MMHG

## 2019-11-25 PROCEDURE — 99213 OFFICE O/P EST LOW 20 MIN: CPT

## 2019-11-25 NOTE — ASSESSMENT
[FreeTextEntry1] : 74 year old woman with h/o of gross hematuria, s/p negative CTU and cystoscopy 9/2018. Only 1 UTI in 6 months. We discussed that 1-2 UTIs per year in women is considered normal. Pt recommended to continue good hygiene. She opts to follow up PRN. Will renew cipro for self start therapy and she can follow up PRN.

## 2019-11-25 NOTE — PHYSICAL EXAM
[General Appearance - Well Developed] : well developed [General Appearance - Well Nourished] : well nourished [Normal Appearance] : normal appearance [Well Groomed] : well groomed [General Appearance - In No Acute Distress] : no acute distress [Abdomen Tenderness] : non-tender [Abdomen Soft] : soft [Costovertebral Angle Tenderness] : no ~M costovertebral angle tenderness [Urinary Bladder Findings] : the bladder was normal on palpation [] : no respiratory distress [Edema] : no peripheral edema [Oriented To Time, Place, And Person] : oriented to person, place, and time [Respiration, Rhythm And Depth] : normal respiratory rhythm and effort [Exaggerated Use Of Accessory Muscles For Inspiration] : no accessory muscle use [Mood] : the mood was normal [Not Anxious] : not anxious [Affect] : the affect was normal [No Focal Deficits] : no focal deficits [Normal Station and Gait] : the gait and station were normal for the patient's age [No Palpable Adenopathy] : no palpable adenopathy

## 2019-11-25 NOTE — REVIEW OF SYSTEMS
[Negative] : Heme/Lymph [Heartburn] : heartburn [both] : pain during and after intercourse [denies] : denies pain with orgasm [Blood in urine that you can see] : blood visible in urine [Wake up at night to urinate  How many times?  ___] : wakes up to urinate [unfilled] times during the night [Joint Swelling] : joint swelling [Leakage of urine with straining, coughing, laughing] : leakage of urine with straining, coughing, laughing [Anxiety] : anxiety

## 2019-11-25 NOTE — PROCEDURE
[Cystoscopy] : cystoscopy [Patient] : the patient [Consent Obtained] : written consent was obtained prior to the procedure and is detailed in the patient's record [Last Aspirin Dose ____] : Reviewed last aspirin dose: [unfilled] [Allergies Reviewed] : Allergies reviewed [Valves/Prosthetics ___] : Pt has the following valves and/or prosthetics: [unfilled] [Pt took necessary Preparations and Antibiotics for Procedure] : pt took necessary preparations and antibiotics for procedure [Time Patient Entered Room ___] : patient entered room: [unfilled] [Time Out ___] : time out occurred at [unfilled] as per policy [Procedure Start Time ___] : procedure start time: [unfilled] [Procedure Stop Time ___] : procedure stop time: [unfilled] [Time Patient Exited Room ___] : patient exited room: [unfilled] [Supine] : supine [Intraurethral 2% Lidocaine Gel ___ (cc)] : [unfilled] cc of 2% Lidocaine Gel was administered intraurethrally [Betadine] : with betadine [Flexible Cystoscope] : A flexible cystoscope was used to visualize the urethra and bladder. [Normal] : was normal [No Bladder Tumor] : No bladder tumor visualized [de-identified] : no trabeculation [] : not  present in the bladder wall [Cytology] : cytology [Instrumented] : an instrumented [Tolerated Well] : the patient tolerated the procedure well [No Complications] : There were no complications

## 2019-11-25 NOTE — HISTORY OF PRESENT ILLNESS
[FreeTextEntry1] : 73 year old woman with complaint of gross hematuria. She has been told she had blood on urinalyses for years, but a week ago she had pink urine.  It is mild in severity as it resolved on its own. Nothing makes the symptoms better, nothing makes sx worse. It is associated with nothing.\par No current hematuria, no dysuria, no frequency, no urgency, no hesitancy, no straining. No incontinence. \par No fevers, no chills, no nausea, no vomiting, no flank pain. No family history contributory to hematuria.\par \par 05/20/2019: Patient presents for follow up. She reports several UTIs since last visit. These occurred in [place]. Symptoms were dysuria and SP discomfort. No  symptoms now and other medical  issues remain unchanged from above. No hematuria, no dysuria, no frequency, no urgency, no hesitancy, no straining. No incontinence. No fevers, no chills, no nausea, no vomiting, no flank pain. \par \par 11/25/2019: Patient presents for follow up. She reports she had a single UTI since last visit, 6 months ago. It was E Coli, and she attributes it to hygeine. She now wipes front to back, and has not has any new UTIs.  No new  symptoms and other medical  issues remain unchanged from above. No hematuria, no dysuria, no frequency, no urgency, no hesitancy, no straining. No incontinence. No fevers, no chills, no nausea, no vomiting, no flank pain.

## 2019-11-26 ENCOUNTER — APPOINTMENT (OUTPATIENT)
Dept: DERMATOLOGY | Facility: CLINIC | Age: 74
End: 2019-11-26
Payer: MEDICARE

## 2019-11-26 DIAGNOSIS — L21.9 SEBORRHEIC DERMATITIS, UNSPECIFIED: ICD-10-CM

## 2019-11-26 PROCEDURE — 99213 OFFICE O/P EST LOW 20 MIN: CPT

## 2019-11-26 RX ORDER — CYCLOBENZAPRINE HYDROCHLORIDE 10 MG/1
10 TABLET, FILM COATED ORAL
Qty: 30 | Refills: 2 | Status: DISCONTINUED | COMMUNITY
Start: 2019-06-06 | End: 2019-11-26

## 2019-11-26 RX ORDER — TIZANIDINE 2 MG/1
2 TABLET ORAL
Qty: 60 | Refills: 0 | Status: DISCONTINUED | COMMUNITY
Start: 2019-06-11 | End: 2019-11-26

## 2019-11-26 RX ORDER — ZOLEDRONIC ACID 5 MG/100ML
5 INJECTION INTRAVENOUS
Qty: 1 | Refills: 0 | Status: DISCONTINUED | COMMUNITY
Start: 2018-12-05 | End: 2019-11-26

## 2019-11-26 NOTE — ASSESSMENT
[FreeTextEntry1] : Complete skin examination is negative for malignancy;\par Continue regular exams; \par redness; glabella;  prob. seb derm, likely with some irritant changes;\par OTC HC cream, vaseline through winter;\par also discussed fillers for NLFs briefly\par \par \par

## 2019-11-26 NOTE — PHYSICAL EXAM
[Full Body Skin Exam Performed] : performed [FreeTextEntry3] : Skin examination performed of the face, neck, trunk, arms, legs; \par The patient is well, alert and oriented, pleasant and cooperative.\par Eyelids, conjunctivae, oral mucosa, digits and nails all normal.  \par No cervical adenopathy.\par \par Normal findings include:\par \par Seborrheic keratoses\par Angiomas\par Lentigines\par erythema;  L glabella crease; \par \par No lesions were suspicious for malignancy. \par \par

## 2019-11-26 NOTE — HISTORY OF PRESENT ILLNESS
[de-identified] : Pt. presents for skin check;\par No itching, bleeding, growing, changing lesions noted;\par Severity:  mild  \par Modifying factors:  none\par Associated symptoms:  none\par Context:  no association with activity \par \par

## 2019-12-01 ENCOUNTER — TRANSCRIPTION ENCOUNTER (OUTPATIENT)
Age: 74
End: 2019-12-01

## 2019-12-05 ENCOUNTER — LABORATORY RESULT (OUTPATIENT)
Age: 74
End: 2019-12-05

## 2019-12-11 ENCOUNTER — APPOINTMENT (OUTPATIENT)
Dept: DERMATOLOGY | Facility: CLINIC | Age: 74
End: 2019-12-11

## 2019-12-13 ENCOUNTER — APPOINTMENT (OUTPATIENT)
Dept: INTERNAL MEDICINE | Facility: CLINIC | Age: 74
End: 2019-12-13
Payer: MEDICARE

## 2019-12-13 VITALS
HEART RATE: 88 BPM | RESPIRATION RATE: 18 BRPM | SYSTOLIC BLOOD PRESSURE: 134 MMHG | BODY MASS INDEX: 30.05 KG/M2 | HEIGHT: 64 IN | TEMPERATURE: 98.6 F | OXYGEN SATURATION: 97 % | WEIGHT: 176 LBS | DIASTOLIC BLOOD PRESSURE: 90 MMHG

## 2019-12-13 PROCEDURE — 99214 OFFICE O/P EST MOD 30 MIN: CPT

## 2019-12-13 RX ORDER — CIPROFLOXACIN HYDROCHLORIDE 500 MG/1
500 TABLET, FILM COATED ORAL TWICE DAILY
Qty: 14 | Refills: 0 | Status: DISCONTINUED | COMMUNITY
Start: 2019-11-25 | End: 2019-12-13

## 2019-12-13 RX ORDER — ACETAMINOPHEN 325 MG
TABLET ORAL
Refills: 0 | Status: DISCONTINUED | COMMUNITY
End: 2019-12-13

## 2019-12-13 RX ORDER — ZOLEDRONIC ACID 5 MG/100ML
5 INJECTION INTRAVENOUS
Qty: 1 | Refills: 0 | Status: DISCONTINUED | COMMUNITY
Start: 2019-09-05 | End: 2019-12-13

## 2019-12-13 NOTE — HISTORY OF PRESENT ILLNESS
[FreeTextEntry1] : followup [de-identified] : Mrs. royal presents for followup evaluation. She is feeling well. She denies any chest pain, shortness of breath palpitations. She has no nocturnal symptoms of cough or dyspnea. Patient does have a history of aortic stenosis. She is followed by Dr. Ennis. She has a history of asymptomatic pyuria and has previously been seen by Dr. Aguila with negative urologic workup.

## 2019-12-13 NOTE — PHYSICAL EXAM
[No Acute Distress] : no acute distress [Well Developed] : well developed [Well Nourished] : well nourished [Normal Sclera/Conjunctiva] : normal sclera/conjunctiva [Well-Appearing] : well-appearing [PERRL] : pupils equal round and reactive to light [EOMI] : extraocular movements intact [Normal Outer Ear/Nose] : the outer ears and nose were normal in appearance [Normal Oropharynx] : the oropharynx was normal [No JVD] : no jugular venous distention [No Lymphadenopathy] : no lymphadenopathy [No Respiratory Distress] : no respiratory distress  [Supple] : supple [Thyroid Normal, No Nodules] : the thyroid was normal and there were no nodules present [Normal Rate] : normal rate  [No Accessory Muscle Use] : no accessory muscle use [Clear to Auscultation] : lungs were clear to auscultation bilaterally [No Murmur] : no murmur heard [Normal S1, S2] : normal S1 and S2 [Regular Rhythm] : with a regular rhythm [No Carotid Bruits] : no carotid bruits [No Abdominal Bruit] : a ~M bruit was not heard ~T in the abdomen [No Varicosities] : no varicosities [Pedal Pulses Present] : the pedal pulses are present [No Palpable Aorta] : no palpable aorta [No Edema] : there was no peripheral edema [No Extremity Clubbing/Cyanosis] : no extremity clubbing/cyanosis [Soft] : abdomen soft [Non Tender] : non-tender [No HSM] : no HSM [Non-distended] : non-distended [No Masses] : no abdominal mass palpated [Normal Anterior Cervical Nodes] : no anterior cervical lymphadenopathy [Normal Bowel Sounds] : normal bowel sounds [Normal Posterior Cervical Nodes] : no posterior cervical lymphadenopathy [No CVA Tenderness] : no CVA  tenderness [No Spinal Tenderness] : no spinal tenderness [Grossly Normal Strength/Tone] : grossly normal strength/tone [No Joint Swelling] : no joint swelling [No Rash] : no rash [Coordination Grossly Intact] : coordination grossly intact [Normal Gait] : normal gait [Deep Tendon Reflexes (DTR)] : deep tendon reflexes were 2+ and symmetric [No Focal Deficits] : no focal deficits [Normal Insight/Judgement] : insight and judgment were intact [Normal Affect] : the affect was normal

## 2019-12-13 NOTE — PLAN
[FreeTextEntry1] : Mrs. royal presents for followup evaluation. She will continue on current medication regimen. Latest echocardiogram does show moderate to severe aortic stenosis. Patient will have a followup appointment with Dr. Bryson in one week. Currently she is asymptomatic. Comprehensive blood profile was reviewed with the patient. She continues to demonstrate asymptomatic pyuria. She will continue on her medication regimen.

## 2019-12-16 ENCOUNTER — TRANSCRIPTION ENCOUNTER (OUTPATIENT)
Age: 74
End: 2019-12-16

## 2020-03-31 ENCOUNTER — TRANSCRIPTION ENCOUNTER (OUTPATIENT)
Age: 75
End: 2020-03-31

## 2020-05-16 ENCOUNTER — TRANSCRIPTION ENCOUNTER (OUTPATIENT)
Age: 75
End: 2020-05-16

## 2020-06-01 LAB
ALBUMIN SERPL ELPH-MCNC: 4.5 G/DL
ALP BLD-CCNC: 53 U/L
ALT SERPL-CCNC: 17 U/L
ANION GAP SERPL CALC-SCNC: 11 MMOL/L
AST SERPL-CCNC: 19 U/L
BILIRUB SERPL-MCNC: 0.4 MG/DL
BUN SERPL-MCNC: 14 MG/DL
CALCIUM SERPL-MCNC: 9.2 MG/DL
CHLORIDE SERPL-SCNC: 107 MMOL/L
CHOLEST SERPL-MCNC: 171 MG/DL
CHOLEST/HDLC SERPL: 2.9 RATIO
CO2 SERPL-SCNC: 24 MMOL/L
CREAT SERPL-MCNC: 0.74 MG/DL
HDLC SERPL-MCNC: 58 MG/DL
LDLC SERPL CALC-MCNC: 80 MG/DL
POTASSIUM SERPL-SCNC: 4.5 MMOL/L
PROT SERPL-MCNC: 6.6 G/DL
SODIUM SERPL-SCNC: 142 MMOL/L
TRIGL SERPL-MCNC: 163 MG/DL
TSH SERPL-ACNC: 0.54 UIU/ML

## 2020-06-12 ENCOUNTER — APPOINTMENT (OUTPATIENT)
Dept: INTERNAL MEDICINE | Facility: CLINIC | Age: 75
End: 2020-06-12
Payer: MEDICARE

## 2020-06-12 VITALS
WEIGHT: 176 LBS | DIASTOLIC BLOOD PRESSURE: 88 MMHG | RESPIRATION RATE: 18 BRPM | HEART RATE: 85 BPM | OXYGEN SATURATION: 97 % | TEMPERATURE: 99.1 F | SYSTOLIC BLOOD PRESSURE: 146 MMHG | HEIGHT: 64 IN | BODY MASS INDEX: 30.05 KG/M2

## 2020-06-12 DIAGNOSIS — R76.8 OTHER SPECIFIED ABNORMAL IMMUNOLOGICAL FINDINGS IN SERUM: ICD-10-CM

## 2020-06-12 DIAGNOSIS — M79.10 MYALGIA, UNSPECIFIED SITE: ICD-10-CM

## 2020-06-12 DIAGNOSIS — M79.89 OTHER SPECIFIED SOFT TISSUE DISORDERS: ICD-10-CM

## 2020-06-12 DIAGNOSIS — Z87.39 PERSONAL HISTORY OF OTHER DISEASES OF THE MUSCULOSKELETAL SYSTEM AND CONNECTIVE TISSUE: ICD-10-CM

## 2020-06-12 DIAGNOSIS — R82.90 UNSPECIFIED ABNORMAL FINDINGS IN URINE: ICD-10-CM

## 2020-06-12 DIAGNOSIS — L72.0 EPIDERMAL CYST: ICD-10-CM

## 2020-06-12 DIAGNOSIS — L23.7 ALLERGIC CONTACT DERMATITIS DUE TO PLANTS, EXCEPT FOOD: ICD-10-CM

## 2020-06-12 DIAGNOSIS — N39.0 URINARY TRACT INFECTION, SITE NOT SPECIFIED: ICD-10-CM

## 2020-06-12 DIAGNOSIS — L73.8 OTHER SPECIFIED FOLLICULAR DISORDERS: ICD-10-CM

## 2020-06-12 PROCEDURE — 99214 OFFICE O/P EST MOD 30 MIN: CPT

## 2020-06-12 NOTE — PHYSICAL EXAM
[Well Developed] : well developed [No Acute Distress] : no acute distress [Well Nourished] : well nourished [Well-Appearing] : well-appearing [Normal Sclera/Conjunctiva] : normal sclera/conjunctiva [PERRL] : pupils equal round and reactive to light [EOMI] : extraocular movements intact [Normal Outer Ear/Nose] : the outer ears and nose were normal in appearance [No Lymphadenopathy] : no lymphadenopathy [No JVD] : no jugular venous distention [Normal Oropharynx] : the oropharynx was normal [No Respiratory Distress] : no respiratory distress  [Thyroid Normal, No Nodules] : the thyroid was normal and there were no nodules present [Supple] : supple [No Accessory Muscle Use] : no accessory muscle use [Clear to Auscultation] : lungs were clear to auscultation bilaterally [Regular Rhythm] : with a regular rhythm [Normal Rate] : normal rate  [Normal S1, S2] : normal S1 and S2 [No Carotid Bruits] : no carotid bruits [No Abdominal Bruit] : a ~M bruit was not heard ~T in the abdomen [No Varicosities] : no varicosities [Pedal Pulses Present] : the pedal pulses are present [No Edema] : there was no peripheral edema [No Palpable Aorta] : no palpable aorta [No Extremity Clubbing/Cyanosis] : no extremity clubbing/cyanosis [Soft] : abdomen soft [Non Tender] : non-tender [Non-distended] : non-distended [No Masses] : no abdominal mass palpated [No HSM] : no HSM [Normal Posterior Cervical Nodes] : no posterior cervical lymphadenopathy [Normal Bowel Sounds] : normal bowel sounds [Normal Anterior Cervical Nodes] : no anterior cervical lymphadenopathy [No CVA Tenderness] : no CVA  tenderness [No Spinal Tenderness] : no spinal tenderness [No Joint Swelling] : no joint swelling [Grossly Normal Strength/Tone] : grossly normal strength/tone [No Rash] : no rash [Coordination Grossly Intact] : coordination grossly intact [No Focal Deficits] : no focal deficits [Normal Gait] : normal gait [Normal Affect] : the affect was normal [Deep Tendon Reflexes (DTR)] : deep tendon reflexes were 2+ and symmetric [Normal Insight/Judgement] : insight and judgment were intact [de-identified] : 2-3/6 systolic murmur second right intercostal space

## 2020-06-12 NOTE — HISTORY OF PRESENT ILLNESS
[FreeTextEntry1] : followup [de-identified] : Mrs. royal presents for followup evaluation. She is feeling well. She denies any chest pain, shortness of breath or palpitations. She has no nocturnal symptoms of cough or dyspnea. She continues on current medication regimen. Mrs. royal has no hematuria or dysuria.

## 2020-06-12 NOTE — PLAN
[FreeTextEntry1] : Mrs. royal presents for a follow up evaluation. She will continue her current medication regimen which has been reviewed. She does have moderate aortic stenosis. She is followed by Dr. zaragoza. The patient be given a prescription for comprehensive blood profile. She will followup in 4 months prior to her departure for [place].

## 2020-07-15 ENCOUNTER — APPOINTMENT (OUTPATIENT)
Dept: INTERNAL MEDICINE | Facility: CLINIC | Age: 75
End: 2020-07-15
Payer: MEDICARE

## 2020-07-15 VITALS
HEIGHT: 64 IN | HEART RATE: 82 BPM | BODY MASS INDEX: 30.05 KG/M2 | TEMPERATURE: 98.7 F | RESPIRATION RATE: 18 BRPM | OXYGEN SATURATION: 98 % | WEIGHT: 176 LBS | SYSTOLIC BLOOD PRESSURE: 142 MMHG | DIASTOLIC BLOOD PRESSURE: 84 MMHG

## 2020-07-15 DIAGNOSIS — B37.89 OTHER SITES OF CANDIDIASIS: ICD-10-CM

## 2020-07-15 PROCEDURE — G0439: CPT

## 2020-07-15 NOTE — HISTORY OF PRESENT ILLNESS
[FreeTextEntry1] : Patient comes in for an annual physical exam.\par  [de-identified] : Simin is a 75-year-old female with history of anxiety disorder, hypothyroidism, hyperlipidemia comes in for annual exam. Patient states previously she was fine with gynecologist as an  like to transfer her care to over here. For the last few months she's noticed a rash on both of her breasts which mild itchiness and soreness and most recently with sensitivity. She was recently using cortisone on it but then stopped.\par Patient states she does a lot of work around the house and has a history of heavy lifting. She noticed right mid back pain likely caused by muscle spasm on and off. Doesn't take any medication for it.\par Patient does have history of uric stenosis and follows with cardiology regularly. Most recently seen last month and is scheduled to have her repeat echocardiogram done.\par Patient usually travels to [place] in October however will likely extend that to December due to  the pandemic.\par Patient denies any cp, sob,abdominal pain, nausea, vomiting, palpitations, fever, chills, constipation, diarrhea.\par

## 2020-07-15 NOTE — ASSESSMENT
[FreeTextEntry1] : 1.annual exam: Given a prescription to have her blood work done in December, repeat mammogram. Up-to-date with vaccinations.\par \par 2.candidiasis of the breast: Discussed starting nystatin cream and to call if symptoms do not resolve. Next\par \par 3.aortic stenosis: Followup cardiology with repeat echocardiogram.\par \par 4.hypothyroidism: Recheck TFTs, continue levothyroxine.\par \par 5.anxiety disorder: Patient states well controlled on Zoloft 50 mg daily.\par \par 6.hyperlipidemia: Continue on Crestor 5 mg daily, recheck fasting lipids.

## 2020-08-28 ENCOUNTER — OUTPATIENT (OUTPATIENT)
Dept: OUTPATIENT SERVICES | Facility: HOSPITAL | Age: 75
LOS: 1 days | End: 2020-08-28
Payer: MEDICARE

## 2020-08-28 ENCOUNTER — APPOINTMENT (OUTPATIENT)
Dept: MAMMOGRAPHY | Facility: CLINIC | Age: 75
End: 2020-08-28
Payer: MEDICARE

## 2020-08-28 ENCOUNTER — RESULT REVIEW (OUTPATIENT)
Age: 75
End: 2020-08-28

## 2020-08-28 DIAGNOSIS — Z00.00 ENCOUNTER FOR GENERAL ADULT MEDICAL EXAMINATION WITHOUT ABNORMAL FINDINGS: ICD-10-CM

## 2020-08-28 PROCEDURE — 77063 BREAST TOMOSYNTHESIS BI: CPT | Mod: 26

## 2020-08-28 PROCEDURE — 77063 BREAST TOMOSYNTHESIS BI: CPT

## 2020-08-28 PROCEDURE — 77067 SCR MAMMO BI INCL CAD: CPT | Mod: 26

## 2020-08-28 PROCEDURE — 77067 SCR MAMMO BI INCL CAD: CPT

## 2020-09-11 ENCOUNTER — APPOINTMENT (OUTPATIENT)
Dept: DERMATOLOGY | Facility: CLINIC | Age: 75
End: 2020-09-11
Payer: MEDICARE

## 2020-09-11 VITALS — HEIGHT: 64 IN | BODY MASS INDEX: 30.05 KG/M2 | WEIGHT: 176 LBS

## 2020-09-11 PROCEDURE — 99213 OFFICE O/P EST LOW 20 MIN: CPT

## 2020-09-11 NOTE — HISTORY OF PRESENT ILLNESS
[de-identified] : Pt. presents for skin check;\par No itching, bleeding, growing, changing lesions noted;\par Severity:  mild  \par Modifying factors:  none\par Associated symptoms:  none\par Context:  no association with activity \par \par

## 2020-09-11 NOTE — ASSESSMENT
[FreeTextEntry1] : Complete skin examination is negative for malignancy;\par Continue regular exams; \par \par also discussed fillers for NLFs briefly\par \par \par

## 2020-09-11 NOTE — PHYSICAL EXAM
[Full Body Skin Exam Performed] : performed [FreeTextEntry3] : Skin examination performed of the face, neck, trunk, arms, legs; \par The patient is well, alert and oriented, pleasant and cooperative.\par Eyelids, conjunctivae, oral mucosa, digits and nails all normal.  \par No cervical adenopathy.\par \par Normal findings include:\par \par Seborrheic keratoses- macular lesions on lower legs; \par Angiomas\par Lentigines\par \par No lesions were suspicious for malignancy. \par \par

## 2020-10-01 ENCOUNTER — APPOINTMENT (OUTPATIENT)
Dept: INTERNAL MEDICINE | Facility: CLINIC | Age: 75
End: 2020-10-01

## 2020-10-30 LAB
25(OH)D3 SERPL-MCNC: 45.8 NG/ML
ALBUMIN SERPL ELPH-MCNC: 4.5 G/DL
ALP BLD-CCNC: 61 U/L
ALT SERPL-CCNC: 16 U/L
ANION GAP SERPL CALC-SCNC: 11 MMOL/L
APPEARANCE: CLEAR
AST SERPL-CCNC: 20 U/L
BACTERIA UR CULT: NORMAL
BACTERIA: NEGATIVE
BASOPHILS # BLD AUTO: 0.02 K/UL
BASOPHILS NFR BLD AUTO: 0.3 %
BILIRUB SERPL-MCNC: 0.5 MG/DL
BILIRUBIN URINE: NEGATIVE
BLOOD URINE: NEGATIVE
BUN SERPL-MCNC: 15 MG/DL
CALCIUM SERPL-MCNC: 9.3 MG/DL
CHLORIDE SERPL-SCNC: 102 MMOL/L
CHOLEST SERPL-MCNC: 166 MG/DL
CO2 SERPL-SCNC: 25 MMOL/L
COLOR: NORMAL
CREAT SERPL-MCNC: 0.76 MG/DL
EOSINOPHIL # BLD AUTO: 0.07 K/UL
EOSINOPHIL NFR BLD AUTO: 1.1 %
GLUCOSE QUALITATIVE U: NEGATIVE
GLUCOSE SERPL-MCNC: 85 MG/DL
HCT VFR BLD CALC: 38.3 %
HDLC SERPL-MCNC: 57 MG/DL
HGB BLD-MCNC: 12.2 G/DL
HYALINE CASTS: 2 /LPF
IMM GRANULOCYTES NFR BLD AUTO: 0.5 %
KETONES URINE: NEGATIVE
LDLC SERPL CALC-MCNC: 82 MG/DL
LEUKOCYTE ESTERASE URINE: ABNORMAL
LYMPHOCYTES # BLD AUTO: 1.7 K/UL
LYMPHOCYTES NFR BLD AUTO: 27.5 %
MAN DIFF?: NORMAL
MCHC RBC-ENTMCNC: 31 PG
MCHC RBC-ENTMCNC: 31.9 GM/DL
MCV RBC AUTO: 97.2 FL
MICROSCOPIC-UA: NORMAL
MONOCYTES # BLD AUTO: 0.45 K/UL
MONOCYTES NFR BLD AUTO: 7.3 %
NEUTROPHILS # BLD AUTO: 3.92 K/UL
NEUTROPHILS NFR BLD AUTO: 63.3 %
NITRITE URINE: NEGATIVE
NONHDLC SERPL-MCNC: 110 MG/DL
PH URINE: 6.5
PLATELET # BLD AUTO: 270 K/UL
POTASSIUM SERPL-SCNC: 4.6 MMOL/L
PROT SERPL-MCNC: 6.8 G/DL
PROTEIN URINE: NEGATIVE
RBC # BLD: 3.94 M/UL
RBC # FLD: 13.1 %
RED BLOOD CELLS URINE: 2 /HPF
SARS-COV-2 IGG SERPL IA-ACNC: <3.8 AU/ML
SARS-COV-2 IGG SERPL QL IA: NEGATIVE
SODIUM SERPL-SCNC: 138 MMOL/L
SPECIFIC GRAVITY URINE: 1.01
SQUAMOUS EPITHELIAL CELLS: 3 /HPF
TRIGL SERPL-MCNC: 137 MG/DL
TSH SERPL-ACNC: 0.8 UIU/ML
UROBILINOGEN URINE: NORMAL
WBC # FLD AUTO: 6.19 K/UL
WHITE BLOOD CELLS URINE: 10 /HPF

## 2020-11-04 ENCOUNTER — APPOINTMENT (OUTPATIENT)
Dept: INTERNAL MEDICINE | Facility: CLINIC | Age: 75
End: 2020-11-04
Payer: MEDICARE

## 2020-11-04 VITALS
DIASTOLIC BLOOD PRESSURE: 80 MMHG | TEMPERATURE: 96.5 F | WEIGHT: 178 LBS | HEART RATE: 100 BPM | SYSTOLIC BLOOD PRESSURE: 126 MMHG | OXYGEN SATURATION: 97 % | HEIGHT: 64 IN | RESPIRATION RATE: 16 BRPM | BODY MASS INDEX: 30.39 KG/M2

## 2020-11-04 PROCEDURE — 99214 OFFICE O/P EST MOD 30 MIN: CPT

## 2020-11-04 RX ORDER — NYSTATIN 100000 [USP'U]/G
100000 CREAM TOPICAL TWICE DAILY
Qty: 1 | Refills: 0 | Status: DISCONTINUED | COMMUNITY
Start: 2020-07-15 | End: 2020-11-04

## 2020-12-04 ENCOUNTER — TRANSCRIPTION ENCOUNTER (OUTPATIENT)
Age: 75
End: 2020-12-04

## 2021-04-27 ENCOUNTER — TRANSCRIPTION ENCOUNTER (OUTPATIENT)
Age: 76
End: 2021-04-27

## 2021-05-24 ENCOUNTER — APPOINTMENT (OUTPATIENT)
Dept: OBGYN | Facility: CLINIC | Age: 76
End: 2021-05-24
Payer: MEDICARE

## 2021-05-24 VITALS
SYSTOLIC BLOOD PRESSURE: 130 MMHG | WEIGHT: 175 LBS | DIASTOLIC BLOOD PRESSURE: 88 MMHG | BODY MASS INDEX: 29.88 KG/M2 | HEIGHT: 64 IN

## 2021-05-24 DIAGNOSIS — Z12.39 ENCOUNTER FOR OTHER SCREENING FOR MALIGNANT NEOPLASM OF BREAST: ICD-10-CM

## 2021-05-24 PROCEDURE — G0101: CPT

## 2021-05-24 NOTE — DISCUSSION/SUMMARY
[FreeTextEntry1] : Health Maintenance:\par -Pap with HPV\par -Mammo Rx\par -TBSE\par -colonoscopy guidelines reviewed with patient. Guiac negative. Will repeat colonoscopy \par -Achieve Vit D levels of 30-40, intake of 1100 mg daily calcium mostly thru dark green\par leafy greens and milk products, exercise 30 minutes TIW\par \par Hysterectomy:\par unsure which ovary remains\par vaginal dryness and narrowing at apex\par dimple noted is right fornix\par although pap sent, will not suggest repeat in future\par \par \par

## 2021-05-24 NOTE — HISTORY OF PRESENT ILLNESS
[FreeTextEntry1] : 76 yo  with LMP hyst at 39\par \par Menarche: 10\par Menopause 46\par \par Hysterectomy and USO for severe bleeding age 45\par \par  [Mammogramdate] : 2020 [PapSmeardate] : 2019 [BoneDensityDate] : 2019 [ColonoscopyDate] : 2018

## 2021-05-26 LAB — HPV HIGH+LOW RISK DNA PNL CVX: NOT DETECTED

## 2021-05-27 ENCOUNTER — LABORATORY RESULT (OUTPATIENT)
Age: 76
End: 2021-05-27

## 2021-05-27 ENCOUNTER — NON-APPOINTMENT (OUTPATIENT)
Age: 76
End: 2021-05-27

## 2021-06-03 ENCOUNTER — APPOINTMENT (OUTPATIENT)
Dept: INTERNAL MEDICINE | Facility: CLINIC | Age: 76
End: 2021-06-03
Payer: MEDICARE

## 2021-06-03 VITALS
SYSTOLIC BLOOD PRESSURE: 132 MMHG | WEIGHT: 175 LBS | BODY MASS INDEX: 29.88 KG/M2 | TEMPERATURE: 96.6 F | RESPIRATION RATE: 16 BRPM | HEART RATE: 87 BPM | OXYGEN SATURATION: 97 % | HEIGHT: 64 IN | DIASTOLIC BLOOD PRESSURE: 82 MMHG

## 2021-06-03 PROCEDURE — 99214 OFFICE O/P EST MOD 30 MIN: CPT

## 2021-06-03 RX ORDER — LEVOFLOXACIN 750 MG/1
750 TABLET, FILM COATED ORAL
Qty: 7 | Refills: 0 | Status: DISCONTINUED | COMMUNITY
Start: 2020-12-24 | End: 2021-06-03

## 2021-06-10 NOTE — ASSESSMENT
[FreeTextEntry1] : 1.hypothyroidism: Continue on levothyroxine 75 mcg once daily, she will call back next month regarding having a one-month refill sent to pharmacy in [place].\par \par 2.hyperlipidemia: Doing well on Crestor 5 mg daily. She'll return for CPE in September.

## 2021-06-10 NOTE — HISTORY OF PRESENT ILLNESS
[FreeTextEntry1] : FU [de-identified] : FATUMA BRISCOE is a 75 year F who is being seen today in follow up to discuss blood work results.\par Reviewed patient's recent blood work done today. Patient denies any urinary symptoms. She'll be going to [place] at the end of July until the end of August and will need refilled her levothyroxine 75 mcg for one month at a pharmacy in [place] and she will call back regarding that next month.\par She has some stress involving her granddaughter.\par Patient denies any cp, sob,abdominal pain, nausea, vomiting, palpitations, fever, chills, constipation, diarrhea.\par

## 2021-06-22 ENCOUNTER — RX RENEWAL (OUTPATIENT)
Age: 76
End: 2021-06-22

## 2021-08-19 ENCOUNTER — EMERGENCY (EMERGENCY)
Facility: HOSPITAL | Age: 76
LOS: 0 days | Discharge: HOME | End: 2021-08-19
Attending: EMERGENCY MEDICINE
Payer: MEDICARE

## 2021-08-19 ENCOUNTER — TRANSCRIPTION ENCOUNTER (OUTPATIENT)
Age: 76
End: 2021-08-19

## 2021-08-19 VITALS
RESPIRATION RATE: 18 BRPM | TEMPERATURE: 99 F | OXYGEN SATURATION: 95 % | SYSTOLIC BLOOD PRESSURE: 143 MMHG | DIASTOLIC BLOOD PRESSURE: 67 MMHG | HEART RATE: 94 BPM

## 2021-08-19 VITALS — WEIGHT: 179.9 LBS | HEIGHT: 64 IN

## 2021-08-19 DIAGNOSIS — R10.9 UNSPECIFIED ABDOMINAL PAIN: ICD-10-CM

## 2021-08-19 DIAGNOSIS — R14.0 ABDOMINAL DISTENSION (GASEOUS): ICD-10-CM

## 2021-08-19 DIAGNOSIS — R50.9 FEVER, UNSPECIFIED: ICD-10-CM

## 2021-08-19 DIAGNOSIS — R19.7 DIARRHEA, UNSPECIFIED: ICD-10-CM

## 2021-08-19 DIAGNOSIS — R11.0 NAUSEA: ICD-10-CM

## 2021-08-19 LAB
ALBUMIN SERPL ELPH-MCNC: 4.3 G/DL — SIGNIFICANT CHANGE UP (ref 3.3–5)
ALP SERPL-CCNC: 62 U/L — SIGNIFICANT CHANGE UP (ref 40–120)
ALT FLD-CCNC: 29 U/L — SIGNIFICANT CHANGE UP (ref 12–78)
ANION GAP SERPL CALC-SCNC: 6 MMOL/L — SIGNIFICANT CHANGE UP (ref 5–17)
APTT BLD: 29.8 SEC — SIGNIFICANT CHANGE UP (ref 27.5–35.5)
AST SERPL-CCNC: 22 U/L — SIGNIFICANT CHANGE UP (ref 15–37)
BASOPHILS # BLD AUTO: 0.02 K/UL — SIGNIFICANT CHANGE UP (ref 0–0.2)
BASOPHILS NFR BLD AUTO: 0.2 % — SIGNIFICANT CHANGE UP (ref 0–2)
BILIRUB SERPL-MCNC: 0.5 MG/DL — SIGNIFICANT CHANGE UP (ref 0.2–1.2)
BUN SERPL-MCNC: 14 MG/DL — SIGNIFICANT CHANGE UP (ref 7–23)
CALCIUM SERPL-MCNC: 8.9 MG/DL — SIGNIFICANT CHANGE UP (ref 8.5–10.1)
CHLORIDE SERPL-SCNC: 107 MMOL/L — SIGNIFICANT CHANGE UP (ref 96–108)
CO2 SERPL-SCNC: 24 MMOL/L — SIGNIFICANT CHANGE UP (ref 22–31)
CREAT SERPL-MCNC: 0.8 MG/DL — SIGNIFICANT CHANGE UP (ref 0.5–1.3)
EOSINOPHIL # BLD AUTO: 0.08 K/UL — SIGNIFICANT CHANGE UP (ref 0–0.5)
EOSINOPHIL NFR BLD AUTO: 0.7 % — SIGNIFICANT CHANGE UP (ref 0–6)
GLUCOSE SERPL-MCNC: 96 MG/DL — SIGNIFICANT CHANGE UP (ref 70–99)
HCT VFR BLD CALC: 40.2 % — SIGNIFICANT CHANGE UP (ref 34.5–45)
HGB BLD-MCNC: 13.1 G/DL — SIGNIFICANT CHANGE UP (ref 11.5–15.5)
IMM GRANULOCYTES NFR BLD AUTO: 0.3 % — SIGNIFICANT CHANGE UP (ref 0–1.5)
INR BLD: 1.05 RATIO — SIGNIFICANT CHANGE UP (ref 0.88–1.16)
LIDOCAIN IGE QN: 145 U/L — SIGNIFICANT CHANGE UP (ref 73–393)
LYMPHOCYTES # BLD AUTO: 1.67 K/UL — SIGNIFICANT CHANGE UP (ref 1–3.3)
LYMPHOCYTES # BLD AUTO: 14.5 % — SIGNIFICANT CHANGE UP (ref 13–44)
MCHC RBC-ENTMCNC: 30.7 PG — SIGNIFICANT CHANGE UP (ref 27–34)
MCHC RBC-ENTMCNC: 32.6 GM/DL — SIGNIFICANT CHANGE UP (ref 32–36)
MCV RBC AUTO: 94.1 FL — SIGNIFICANT CHANGE UP (ref 80–100)
MONOCYTES # BLD AUTO: 0.72 K/UL — SIGNIFICANT CHANGE UP (ref 0–0.9)
MONOCYTES NFR BLD AUTO: 6.2 % — SIGNIFICANT CHANGE UP (ref 2–14)
NEUTROPHILS # BLD AUTO: 9 K/UL — HIGH (ref 1.8–7.4)
NEUTROPHILS NFR BLD AUTO: 78.1 % — HIGH (ref 43–77)
PLATELET # BLD AUTO: 290 K/UL — SIGNIFICANT CHANGE UP (ref 150–400)
POTASSIUM SERPL-MCNC: 3.8 MMOL/L — SIGNIFICANT CHANGE UP (ref 3.5–5.3)
POTASSIUM SERPL-SCNC: 3.8 MMOL/L — SIGNIFICANT CHANGE UP (ref 3.5–5.3)
PROT SERPL-MCNC: 7.9 GM/DL — SIGNIFICANT CHANGE UP (ref 6–8.3)
PROTHROM AB SERPL-ACNC: 12.2 SEC — SIGNIFICANT CHANGE UP (ref 10.6–13.6)
RBC # BLD: 4.27 M/UL — SIGNIFICANT CHANGE UP (ref 3.8–5.2)
RBC # FLD: 13.2 % — SIGNIFICANT CHANGE UP (ref 10.3–14.5)
SODIUM SERPL-SCNC: 137 MMOL/L — SIGNIFICANT CHANGE UP (ref 135–145)
WBC # BLD: 11.53 K/UL — HIGH (ref 3.8–10.5)
WBC # FLD AUTO: 11.53 K/UL — HIGH (ref 3.8–10.5)

## 2021-08-19 PROCEDURE — 83690 ASSAY OF LIPASE: CPT

## 2021-08-19 PROCEDURE — G1004: CPT

## 2021-08-19 PROCEDURE — 86850 RBC ANTIBODY SCREEN: CPT

## 2021-08-19 PROCEDURE — 85610 PROTHROMBIN TIME: CPT

## 2021-08-19 PROCEDURE — 74177 CT ABD & PELVIS W/CONTRAST: CPT

## 2021-08-19 PROCEDURE — 86901 BLOOD TYPING SEROLOGIC RH(D): CPT

## 2021-08-19 PROCEDURE — 80053 COMPREHEN METABOLIC PANEL: CPT

## 2021-08-19 PROCEDURE — 85730 THROMBOPLASTIN TIME PARTIAL: CPT

## 2021-08-19 PROCEDURE — 96374 THER/PROPH/DIAG INJ IV PUSH: CPT | Mod: XU

## 2021-08-19 PROCEDURE — 74177 CT ABD & PELVIS W/CONTRAST: CPT | Mod: 26,ME

## 2021-08-19 PROCEDURE — 85025 COMPLETE CBC W/AUTO DIFF WBC: CPT

## 2021-08-19 PROCEDURE — 86900 BLOOD TYPING SEROLOGIC ABO: CPT

## 2021-08-19 PROCEDURE — 99284 EMERGENCY DEPT VISIT MOD MDM: CPT

## 2021-08-19 PROCEDURE — 36415 COLL VENOUS BLD VENIPUNCTURE: CPT

## 2021-08-19 PROCEDURE — 99284 EMERGENCY DEPT VISIT MOD MDM: CPT | Mod: 25

## 2021-08-19 RX ORDER — FAMOTIDINE 10 MG/ML
20 INJECTION INTRAVENOUS ONCE
Refills: 0 | Status: COMPLETED | OUTPATIENT
Start: 2021-08-19 | End: 2021-08-19

## 2021-08-19 RX ORDER — SODIUM CHLORIDE 9 MG/ML
1000 INJECTION INTRAMUSCULAR; INTRAVENOUS; SUBCUTANEOUS ONCE
Refills: 0 | Status: COMPLETED | OUTPATIENT
Start: 2021-08-19 | End: 2021-08-19

## 2021-08-19 RX ADMIN — SODIUM CHLORIDE 1000 MILLILITER(S): 9 INJECTION INTRAMUSCULAR; INTRAVENOUS; SUBCUTANEOUS at 18:47

## 2021-08-19 RX ADMIN — FAMOTIDINE 20 MILLIGRAM(S): 10 INJECTION INTRAVENOUS at 18:47

## 2021-08-19 NOTE — ED ADULT NURSE NOTE - NSIMPLEMENTINTERV_GEN_ALL_ED
Implemented All Universal Safety Interventions:  Platter to call system. Call bell, personal items and telephone within reach. Instruct patient to call for assistance. Room bathroom lighting operational. Non-slip footwear when patient is off stretcher. Physically safe environment: no spills, clutter or unnecessary equipment. Stretcher in lowest position, wheels locked, appropriate side rails in place.

## 2021-08-19 NOTE — ED ADULT NURSE NOTE - OBJECTIVE STATEMENT
Pt comes to ED with c/o diarrhea. As per pt she had diarrhea since yesterday morning. Pt report hx divertic. Reports nausea denies vomiting.

## 2021-08-19 NOTE — ED STATDOCS - PATIENT PORTAL LINK FT
You can access the FollowMyHealth Patient Portal offered by [facility] by registering at the following website: [email or web address]. By joining [facility]’s FollowMyHealth portal, you will also be able to view your health information using other applications (apps) compatible with our system.

## 2021-08-19 NOTE — ED STATDOCS - PROGRESS NOTE DETAILS
signed Jany Curry PA-C Pt seen initially in intake by Dr Bella.   76F c/o diarrhea for 2 days after taking fiber and miralax. Pt having fewer BMs today. Denies fever or blood. No significant findings on labwork or imaging. Mildly elevated WBC. abdomen soft, non-tender. Pt feeling well, eager to DC home. Has appt with JAMAR Chang on 8/23. return precautions given. Pt feeling well at DC, agrees with DC and plan of care.

## 2021-08-19 NOTE — ED STATDOCS - CLINICAL SUMMARY MEDICAL DECISION MAKING FREE TEXT BOX
diarrhea likely due to laxative overuse. No significant findings on labwork or imaging. Pt feeling well would like to DC. abdomen soft, non-tender.

## 2021-08-19 NOTE — ED STATDOCS - GASTROINTESTINAL, MLM
abdomen soft, mild diffuse abdominal tenderness, and non-distended. Bowel sounds present. abdomen soft, +mild diffuse abdominal tenderness, and non-distended. Bowel sounds present.

## 2021-08-19 NOTE — ED STATDOCS - NSFOLLOWUPINSTRUCTIONS_ED_ALL_ED_FT
Acute Diarrhea    WHAT YOU NEED TO KNOW:    Acute diarrhea starts quickly and lasts a short time, usually 1 to 3 days. It can last up to 2 weeks. You may not be able to control your diarrhea. Acute diarrhea usually stops on its own.     DISCHARGE INSTRUCTIONS:    Return to the emergency department if:     You feel confused.       Your heartbeat is faster than usual.       Your eyes look deeply sunken, or you have no tears when you cry.       You urinate less than usual, or your urine is dark yellow.       You have blood or mucus in your bowel movements.      You have severe abdominal pain.       You are unable to drink any liquids.     Contact your healthcare provider if:     Your symptoms do not get better with treatment.       You have a fever higher than 101.3°F (38.5°C).       You have trouble eating and drinking because you are vomiting.       Your diarrhea does not get better in 7 days.       You have questions or concerns about your condition or care.     Follow up with your healthcare provider as directed: Write down your questions so you remember to ask them during your visits.     Medicines:    Diarrhea medicine is an over-the-counter medicine that helps slow or stop your diarrhea. Do not take this medicine unless your healthcare provider says it is okay.       Antibiotics may be given to help treat an infection caused by bacteria.       Antiparasitics may be given to treat an infection caused by parasites.       Take your medicine as directed. Contact your healthcare provider if you think your medicine is not helping or if you have side effects. Tell him of her if you are allergic to any medicine. Keep a list of the medicines, vitamins, and herbs you take. Include the amounts, and when and why you take them. Bring the list or the pill bottles to follow-up visits. Carry your medicine list with you in case of an emergency.    Self-care:     Drink liquids as directed. Liquids will help prevent dehydration caused by diarrhea. Ask your healthcare provider how much liquid to drink each day and which liquids are best for you. You may need to drink an oral rehydration solution (ORS). An ORS has the right amounts of water, salts, and sugar you need to replace body fluids. You can buy an ORS at most grocery stores and pharmacies.       Eat foods that are easy to digest. Examples include rice, lentils, cereal, bananas, potatoes, and bread. It also includes some fruits (bananas, melon), well-cooked vegetables, and lean meats. Do not eat foods high in fiber, fat, and sugar. Do not drink alcohol until your diarrhea is gone.     Prevent acute diarrhea:     Wash your hands often. Use soap and water. Wash your hands before you eat or prepare food. Also wash your hands after you use the bathroom. Use an alcohol-based hand gel when soap and water are not available. Handwashing           Keep bathroom surfaces clean. This helps prevent the spread of germs that cause acute diarrhea.       Wash fruits and vegetables well before you eat them. This can help remove germs that cause diarrhea. If possible, remove the skin from fruits and vegetables, or cook them well before you eat them.       Cook meat and poultry as directed. Meat includes beef and pork. Poultry includes chicken, turkey, and duck.  Cook ground meat to 160°F.       Cook ground poultry, whole poultry, or cuts of poultry to at least 165°F. Remove the poultry from heat. Let it stand for 3 minutes before you eat it.       Cook whole cuts of meat other than poultry to at least 145°F. Remove the meat from heat. Let it stand for 3 minutes before you eat it.       Wash dishes that have touched raw meat or poultry with hot water and soap. This includes cutting boards, utensils, dishes, and serving containers.       Place raw or cooked meat or poultry in the refrigerator as soon as possible. Bacteria can grow in meat or poultry that is left at room temperature too long.       Do not eat raw or undercooked oysters, clams, or mussels. These foods may be contaminated and cause infection.       Drink only filtered or treated water when you travel. Do not put ice in your drinks. Drink bottled water whenever possible.     FOLLOW UP WITH DR PLATT ON MONDAY. RETURN TO ER FOR ANY WORSENING SYMPTOMS OR NEW CONCERNS.

## 2021-08-19 NOTE — ED STATDOCS - OBJECTIVE STATEMENT
75 y/o female with a PMHx of Diverticulitis, presents to the ED c/o diarrhea. Pt started taking Miralax again 3 days ago for diffuse abdominal discomfort and bloating. Pt with diarrhea yesterday and this morning. Pt reports yellow diarrhea yesterday so was seen by Urgent Care and was sent for a CT. States she had a mild fever today. +nausea, no vomiting. 77 y/o female with a PMHx of Diverticulitis, presents to the ED c/o diarrhea. Pt started taking Miralax again 3 days ago for diffuse abdominal discomfort and bloating. Pt with diarrhea yesterday and this morning. Pt reports some "yellow" diarrhea yesterday so was seen by Urgent Care and was sent for a CT. States she had a mild fever today. +nausea, no vomiting.

## 2021-08-19 NOTE — ED ADULT TRIAGE NOTE - CHIEF COMPLAINT QUOTE
pt presents to ED c/o abdominal bloating and green/yellow diarrhea x 1 day. hx diverticulitis but states this does not feel the same. denies abd pain

## 2021-08-30 ENCOUNTER — OUTPATIENT (OUTPATIENT)
Dept: OUTPATIENT SERVICES | Facility: HOSPITAL | Age: 76
LOS: 1 days | End: 2021-08-30
Payer: MEDICARE

## 2021-08-30 ENCOUNTER — APPOINTMENT (OUTPATIENT)
Dept: MAMMOGRAPHY | Facility: CLINIC | Age: 76
End: 2021-08-30
Payer: MEDICARE

## 2021-08-30 ENCOUNTER — RESULT REVIEW (OUTPATIENT)
Age: 76
End: 2021-08-30

## 2021-08-30 DIAGNOSIS — Z12.39 ENCOUNTER FOR OTHER SCREENING FOR MALIGNANT NEOPLASM OF BREAST: ICD-10-CM

## 2021-08-30 PROCEDURE — 77067 SCR MAMMO BI INCL CAD: CPT | Mod: 26

## 2021-08-30 PROCEDURE — 77063 BREAST TOMOSYNTHESIS BI: CPT | Mod: 26

## 2021-08-30 PROCEDURE — 77067 SCR MAMMO BI INCL CAD: CPT

## 2021-08-30 PROCEDURE — 77063 BREAST TOMOSYNTHESIS BI: CPT

## 2021-09-09 ENCOUNTER — APPOINTMENT (OUTPATIENT)
Dept: INTERNAL MEDICINE | Facility: CLINIC | Age: 76
End: 2021-09-09
Payer: MEDICARE

## 2021-09-09 VITALS
BODY MASS INDEX: 30.9 KG/M2 | WEIGHT: 181 LBS | SYSTOLIC BLOOD PRESSURE: 164 MMHG | TEMPERATURE: 98.3 F | HEIGHT: 64 IN | HEART RATE: 75 BPM | DIASTOLIC BLOOD PRESSURE: 80 MMHG | OXYGEN SATURATION: 99 %

## 2021-09-09 VITALS — DIASTOLIC BLOOD PRESSURE: 80 MMHG | SYSTOLIC BLOOD PRESSURE: 140 MMHG

## 2021-09-09 DIAGNOSIS — Z23 ENCOUNTER FOR IMMUNIZATION: ICD-10-CM

## 2021-09-09 DIAGNOSIS — R09.82 POSTNASAL DRIP: ICD-10-CM

## 2021-09-09 PROCEDURE — G0439: CPT

## 2021-09-12 NOTE — HISTORY OF PRESENT ILLNESS
[FreeTextEntry1] : Patient comes in for an annual wellness visit.\par  [de-identified] : FATUMA BRISCOE is a 76 year F who comes in for an annual physical exam.\par Pt has had hx of cyst on right elbow and in last few months itching and growing in size and will see derm next month for excision. \par She had diarrhea about 8/17 and on 8/19 went to  and was sent to  ED and had ct scan abdomen which showed enteritis and her sx did resolve after treatment with abx. She did then see  after discharge who recommended benefiber once daily and she feels well now. \par Patient denies any cp, sob,abdominal pain, nausea, vomiting, palpitations, fever, chills, constipation, diarrhea.\par \par

## 2021-09-12 NOTE — HEALTH RISK ASSESSMENT
[Yes] : Yes [Monthly or less (1 pt)] : Monthly or less (1 point) [1 or 2 (0 pts)] : 1 or 2 (0 points) [Never (0 pts)] : Never (0 points) [0] : 2) Feeling down, depressed, or hopeless: Not at all (0) [Patient reported mammogram was normal] : Patient reported mammogram was normal [Patient reported PAP Smear was normal] : Patient reported PAP Smear was normal [Patient reported bone density results were normal] : Patient reported bone density results were normal [Patient reported colonoscopy was normal] : Patient reported colonoscopy was normal [PHQ-2 Negative - No further assessment needed] : PHQ-2 Negative - No further assessment needed [] : No [[id number]] : 0 [MammogramDate] : 08/2021 [PapSmearDate] : 05/2021 [BoneDensityDate] : 01/2019 [ColonoscopyDate] : 01/2018

## 2021-09-12 NOTE — ASSESSMENT
[FreeTextEntry1] : 1.HM: Patient counseled regarding recommendations for vaccines, seat belt safety, diet and exercise and all preventative screening.\par Discussed fasting blood work to get.\par \par 2.Right elbow cyst: f/u with surgery for excision. \par \par 3. HLD: continue on crestor. Patient advised on low cholesterol diet-decrease in white carbs and exercise 150 minutes per week.\par

## 2021-10-10 ENCOUNTER — APPOINTMENT (OUTPATIENT)
Dept: DISASTER EMERGENCY | Facility: CLINIC | Age: 76
End: 2021-10-10

## 2021-10-11 LAB — SARS-COV-2 N GENE NPH QL NAA+PROBE: NOT DETECTED

## 2021-10-12 NOTE — H&P ADULT - ASSESSMENT
75 y.o female with nonrheumatic calcific aortic stenosis, HLD with increased in dyspnea on climbing up hills found to have sever AS (CASS 0.46 cm2). Pt presents to cath lab for RHC and LHC with possible PCI prior to TAVR procedure.     ASA class:  Cr:  GFR:  Bleeding risk:  75 y.o female with nonrheumatic calcific aortic stenosis, HLD with increased in dyspnea on climbing up hills found to have sever AS (CASS 0.46 cm2). Pt presents to cath lab for RHC and LHC with possible PCI prior to TAVR procedure.     ASA class: II  Cr: 0.75  GFR: 77  Bleeding risk:  1.8%    -Consent obtained for cardiac catheterization w/ coronary angiogram and possible stent placement. Pt is competent, has capacity, and understands risks and benefits of procedure. Risks and benefits discussed. Risk discussed included, but not limited to MI, stroke, mortality, major bleeding, arrythmia, or infection. All questions answered

## 2021-10-12 NOTE — H&P ADULT - NSHPLABSRESULTS_GEN_ALL_CORE
EKG (10/13/21):   Echo (9/9/21): EF 50-55%, severe AS (CASS 0.48cm2), mild pulmonary hypertension EKG (10/13/21):   NSR rate 89    Echo (9/9/21): EF 50-55%, severe AS (CASS 0.48cm2), mild pulmonary hypertension

## 2021-10-12 NOTE — H&P ADULT - HISTORY OF PRESENT ILLNESS
Internal Medicine
75 y.o female with nonrheumatic calcific aortic stenosis, HLD with increased in dyspnea on climbing up hills found to have sever AS (CASS 0.46 cm2). Pt presents to cath lab for RHC and LHC with possible PCI prior to TAVR procedure.   COVID-19 PCR (10/10/2021): NotDetect

## 2021-10-12 NOTE — H&P ADULT - NSICDXFAMILYHX_GEN_ALL_CORE_FT
FAMILY HISTORY:  Father  Still living? No  Family history of angina, Age at diagnosis: Age Unknown

## 2021-10-12 NOTE — H&P ADULT - PROBLEM SELECTOR PLAN 1
severe aortic stenosis with AMADOR.   - plan for Rt and Lt heart cardiac cath with possible PCI prior to TAVR   - risks and benefits of procedure reviewed, all questions answered   - informed consent obtained, pt verbalized understanding.

## 2021-10-13 ENCOUNTER — OUTPATIENT (OUTPATIENT)
Dept: OUTPATIENT SERVICES | Facility: HOSPITAL | Age: 76
LOS: 1 days | Discharge: HOME | End: 2021-10-13
Payer: MEDICARE

## 2021-10-13 VITALS
TEMPERATURE: 98 F | HEART RATE: 90 BPM | HEIGHT: 64 IN | OXYGEN SATURATION: 99 % | WEIGHT: 175.05 LBS | RESPIRATION RATE: 16 BRPM | SYSTOLIC BLOOD PRESSURE: 150 MMHG | DIASTOLIC BLOOD PRESSURE: 87 MMHG

## 2021-10-13 VITALS — HEART RATE: 91 BPM

## 2021-10-13 DIAGNOSIS — I35.0 NONRHEUMATIC AORTIC (VALVE) STENOSIS: ICD-10-CM

## 2021-10-13 DIAGNOSIS — I27.20 PULMONARY HYPERTENSION, UNSPECIFIED: ICD-10-CM

## 2021-10-13 DIAGNOSIS — I65.23 OCCLUSION AND STENOSIS OF BILATERAL CAROTID ARTERIES: ICD-10-CM

## 2021-10-13 DIAGNOSIS — I25.10 ATHEROSCLEROTIC HEART DISEASE OF NATIVE CORONARY ARTERY WITHOUT ANGINA PECTORIS: ICD-10-CM

## 2021-10-13 DIAGNOSIS — E78.2 MIXED HYPERLIPIDEMIA: ICD-10-CM

## 2021-10-13 DIAGNOSIS — Z88.0 ALLERGY STATUS TO PENICILLIN: ICD-10-CM

## 2021-10-13 DIAGNOSIS — R94.39 ABNORMAL RESULT OF OTHER CARDIOVASCULAR FUNCTION STUDY: ICD-10-CM

## 2021-10-13 DIAGNOSIS — I73.9 PERIPHERAL VASCULAR DISEASE, UNSPECIFIED: ICD-10-CM

## 2021-10-13 PROCEDURE — C1769: CPT

## 2021-10-13 PROCEDURE — 93005 ELECTROCARDIOGRAM TRACING: CPT | Mod: XU

## 2021-10-13 PROCEDURE — C1887: CPT

## 2021-10-13 PROCEDURE — 99152 MOD SED SAME PHYS/QHP 5/>YRS: CPT

## 2021-10-13 PROCEDURE — 93010 ELECTROCARDIOGRAM REPORT: CPT

## 2021-10-13 PROCEDURE — 93460 R&L HRT ART/VENTRICLE ANGIO: CPT

## 2021-10-13 PROCEDURE — 99153 MOD SED SAME PHYS/QHP EA: CPT

## 2021-10-13 PROCEDURE — C1894: CPT

## 2021-10-13 NOTE — PACU DISCHARGE NOTE - COMMENTS
vss no s/s bleeding or hematoma d/c teaching done with teachbackiv d/maite pt without c/o discomfort

## 2021-10-13 NOTE — PROGRESS NOTE ADULT - SUBJECTIVE AND OBJECTIVE BOX
Nurse Practitioner Progress note:     HPI:  75 y.o female with nonrheumatic calcific aortic stenosis, HLD with increased in dyspnea on climbing up hills found to have sever AS (CASS 0.46 cm2). Pt presents to cath lab for RHC and LHC with possible PCI prior to TAVR procedure.   S/P RHC & LHC revealing aortic stenosis and normal cors            T(C): 36.8 HR: 90 BP: 150/87 RR: 16 SpO2: 99%     PHYSICAL EXAM:  NEURO: Non-focal, AxOx3.  No neuro deficits NECK: Supple, No JVD, No LAD  CHEST/LUNG: Clear to auscultation bilaterally; No wheeze  HEART: s1 s2 Regular rate and rhythm; No murmurs, rubs, or gallops  ABDOMEN: Soft, Nontender, Nondistended; Bowel sounds present X 4 quadrants   EXTREMITIES:  2+ Peripheral Pulses, No clubbing, cyanosis, or edema   VASCULAR: Peripheral pulses palpable 2+ bilaterally  PROCEDURE SITE: right band removed one hour post placement,Site is without hematoma or bleeding. Sensation and LELIA intact. Distal pulses palpable 2+, capillary refill < 2 seconds. Patient denies pain, numbness, tingling, CP or SOB. Clean dry dressing applied     PROCEDURE RESULTS:  full report to follow     ASSESSMENT: HPI:  75 y.o female with nonrheumatic calcific aortic stenosis, HLD with increased in dyspnea on climbing up hills found to have sever AS (CASS 0.46 cm2). Pt presents to cath lab for RHC and LHC with possible PCI prior to TAVR procedure.   S/p RHC & LHC revealing aortic stenosis and normal cors.         PLAN:  -VS, diet, activity as per post cath orders  -Encourage PO fluids  -Continue current medications  -Plan for TAVR in future  -Plan of care discussed with patient and Dr Ennis  -Post cath instructions reviewed, patient verbalizes and understands instructions  - Patient to be discharged home, recommend following up with cardiologist in 1-2 weeks

## 2021-10-29 ENCOUNTER — APPOINTMENT (OUTPATIENT)
Dept: DERMATOLOGY | Facility: CLINIC | Age: 76
End: 2021-10-29
Payer: MEDICARE

## 2021-10-29 DIAGNOSIS — L72.3 SEBACEOUS CYST: ICD-10-CM

## 2021-10-29 PROBLEM — I35.0 NONRHEUMATIC AORTIC (VALVE) STENOSIS: Chronic | Status: ACTIVE | Noted: 2021-10-12

## 2021-10-29 PROBLEM — E78.5 HYPERLIPIDEMIA, UNSPECIFIED: Chronic | Status: ACTIVE | Noted: 2021-10-12

## 2021-10-29 PROCEDURE — 99213 OFFICE O/P EST LOW 20 MIN: CPT

## 2021-10-29 NOTE — PHYSICAL EXAM
[Full Body Skin Exam Performed] : performed [FreeTextEntry3] : Skin examination performed of the face, neck, trunk, arms, legs; \par The patient is well, alert and oriented, pleasant and cooperative.\par Eyelids, conjunctivae, oral mucosa, digits and nails all normal.  \par No cervical adenopathy.\par \par Normal findings include:\par \par Seborrheic keratoses- macular lesions on lower legs; \par Angiomas\par Lentigines\par Mobile subcutaneous nodule with normal overlying skin - R elbow;\par \par No lesions were suspicious for malignancy. \par \par

## 2021-10-29 NOTE — ASSESSMENT
[FreeTextEntry1] : Complete skin examination is negative for malignancy; Multiple new concerns were addressed and discussed.\par Therapeutic options and their risks and benefits; along with multiple diagnostic possibilities were discussed at length;\par risks and benefits of skin biopsy and/or other further study were discussed;\par \par Cyst;  ? EIC vs. tendon sheath; R elbow;  t/c hand surgery referral only if + Sxs\par \par Continue regular exams; Follow up for TBSE in 1 year \par \par also discussed fillers for NLFs briefly\par \par \par

## 2021-10-29 NOTE — HISTORY OF PRESENT ILLNESS
[de-identified] : Pt. presents for skin check;\par c/o few spots of concern;  \par Severity:  mild  \par Modifying factors:  none\par Associated symptoms:  none\par Context:  no association with activity \par

## 2021-12-09 ENCOUNTER — TRANSCRIPTION ENCOUNTER (OUTPATIENT)
Age: 76
End: 2021-12-09

## 2021-12-14 ENCOUNTER — APPOINTMENT (OUTPATIENT)
Dept: INTERNAL MEDICINE | Facility: CLINIC | Age: 76
End: 2021-12-14
Payer: MEDICARE

## 2021-12-14 VITALS
DIASTOLIC BLOOD PRESSURE: 82 MMHG | HEIGHT: 64 IN | SYSTOLIC BLOOD PRESSURE: 150 MMHG | TEMPERATURE: 97.8 F | HEART RATE: 77 BPM | WEIGHT: 176 LBS | BODY MASS INDEX: 30.05 KG/M2 | OXYGEN SATURATION: 99 %

## 2021-12-14 VITALS — DIASTOLIC BLOOD PRESSURE: 72 MMHG | SYSTOLIC BLOOD PRESSURE: 138 MMHG

## 2021-12-14 LAB
25(OH)D3 SERPL-MCNC: 48.5 NG/ML
ALBUMIN SERPL ELPH-MCNC: 4.3 G/DL
ALP BLD-CCNC: 66 U/L
ALT SERPL-CCNC: 14 U/L
ANION GAP SERPL CALC-SCNC: 12 MMOL/L
AST SERPL-CCNC: 19 U/L
BASOPHILS # BLD AUTO: 0.03 K/UL
BASOPHILS NFR BLD AUTO: 0.4 %
BILIRUB SERPL-MCNC: 0.2 MG/DL
BUN SERPL-MCNC: 18 MG/DL
CALCIUM SERPL-MCNC: 8.8 MG/DL
CHLORIDE SERPL-SCNC: 105 MMOL/L
CHOLEST SERPL-MCNC: 134 MG/DL
CO2 SERPL-SCNC: 22 MMOL/L
CREAT SERPL-MCNC: 0.82 MG/DL
EOSINOPHIL # BLD AUTO: 0.07 K/UL
EOSINOPHIL NFR BLD AUTO: 1 %
ESTIMATED AVERAGE GLUCOSE: 111 MG/DL
GLUCOSE SERPL-MCNC: 102 MG/DL
HBA1C MFR BLD HPLC: 5.5 %
HCT VFR BLD CALC: 36.2 %
HDLC SERPL-MCNC: 59 MG/DL
HGB BLD-MCNC: 11.9 G/DL
IMM GRANULOCYTES NFR BLD AUTO: 0.3 %
LDLC SERPL CALC-MCNC: 57 MG/DL
LYMPHOCYTES # BLD AUTO: 2.01 K/UL
LYMPHOCYTES NFR BLD AUTO: 28.4 %
MAN DIFF?: NORMAL
MCHC RBC-ENTMCNC: 32.2 PG
MCHC RBC-ENTMCNC: 32.9 GM/DL
MCV RBC AUTO: 98.1 FL
MONOCYTES # BLD AUTO: 0.59 K/UL
MONOCYTES NFR BLD AUTO: 8.3 %
NEUTROPHILS # BLD AUTO: 4.36 K/UL
NEUTROPHILS NFR BLD AUTO: 61.6 %
NONHDLC SERPL-MCNC: 75 MG/DL
PLATELET # BLD AUTO: 265 K/UL
POTASSIUM SERPL-SCNC: 4.1 MMOL/L
PROT SERPL-MCNC: 6.6 G/DL
RBC # BLD: 3.69 M/UL
RBC # FLD: 13.2 %
SODIUM SERPL-SCNC: 139 MMOL/L
TRIGL SERPL-MCNC: 91 MG/DL
TSH SERPL-ACNC: 0.97 UIU/ML
VIT B12 SERPL-MCNC: 561 PG/ML
WBC # FLD AUTO: 7.08 K/UL

## 2021-12-14 PROCEDURE — 99214 OFFICE O/P EST MOD 30 MIN: CPT

## 2021-12-14 RX ORDER — CHROMIUM 200 MCG
TABLET ORAL
Refills: 0 | Status: ACTIVE | COMMUNITY

## 2021-12-14 RX ORDER — BIOTIN 10 MG
TABLET ORAL DAILY
Refills: 0 | Status: DISCONTINUED | COMMUNITY
End: 2021-12-14

## 2021-12-15 NOTE — HISTORY OF PRESENT ILLNESS
[FreeTextEntry1] : FU [de-identified] : FATUMA BRISCOE is a 76 year F who comes in for a follow up visit.\par Pt notes she saw  in Septembers and had Exercise treadmill stress test at that time that was abnormal and therefore she had cardiac cath on 10/13/21 which showed non obstructive CAD with LVEF of 75%. She has follow up with him today. \par She admits to eating nuts and strawberries last week and then started to have LLQ abdominal pain x 1 day and went to [facility] on 12/9/21 and dx with diverticulitis and got rx for Cipro and Flagyl. Her abdominal has improved. \par She did see  in sept and ct scan at that time was normal. \par Patient denies any cp, sob,abdominal pain, nausea, vomiting, palpitations, fever, chills, constipation, diarrhea.\par

## 2021-12-15 NOTE — ASSESSMENT
[FreeTextEntry1] : 1.diverticulitis: Continue on Cipro and Flagyl and if any worsening symptoms will need CT abdomen pelvis and followup with GI. Discussed foods to avoid.\par \par 2.nonobstructive CAD : on recent cardiac catheterization. Follow up with cardiology today.

## 2022-04-21 ENCOUNTER — TRANSCRIPTION ENCOUNTER (OUTPATIENT)
Age: 77
End: 2022-04-21

## 2022-06-01 ENCOUNTER — NON-APPOINTMENT (OUTPATIENT)
Age: 77
End: 2022-06-01

## 2022-06-03 ENCOUNTER — APPOINTMENT (OUTPATIENT)
Dept: OBGYN | Facility: CLINIC | Age: 77
End: 2022-06-03
Payer: MEDICARE

## 2022-06-03 VITALS
BODY MASS INDEX: 30.21 KG/M2 | TEMPERATURE: 98 F | SYSTOLIC BLOOD PRESSURE: 134 MMHG | WEIGHT: 176 LBS | DIASTOLIC BLOOD PRESSURE: 80 MMHG

## 2022-06-03 DIAGNOSIS — M85.80 OTHER SPECIFIED DISORDERS OF BONE DENSITY AND STRUCTURE, UNSPECIFIED SITE: ICD-10-CM

## 2022-06-03 DIAGNOSIS — L90.0 LICHEN SCLEROSUS ET ATROPHICUS: ICD-10-CM

## 2022-06-03 PROCEDURE — 99214 OFFICE O/P EST MOD 30 MIN: CPT

## 2022-06-03 NOTE — PHYSICAL EXAM
[Appropriately responsive] : appropriately responsive [Alert] : alert [No Acute Distress] : no acute distress [Soft] : soft [Non-tender] : non-tender [Non-distended] : non-distended [No HSM] : No HSM [No Lesions] : no lesions [No Mass] : no mass [Oriented x3] : oriented x3 [Examination Of The Breasts] : a normal appearance [No Masses] : no breast masses were palpable [Vulvar Atrophy] : vulvar atrophy [Labia Majora] : normal [Labia Minora] : normal [Normal] : normal [Atrophy] : atrophy [Absent] : absent [Uterine Adnexae] : non-palpable [No Tenderness] : no tenderness [FreeTextEntry2] : no evidence of LS [FreeTextEntry4] : agglutination of tissue at introitus [FreeTextEntry9] : guaiac neg

## 2022-06-03 NOTE — HISTORY OF PRESENT ILLNESS
[TextBox_4] : Pt had total hysterectomy and USO at 39 for bleeding\par Hx of LS dx several yrs ago, uses clobetasol about once/week. Doesnt need rx today\par takes vit d, exercises\par reclast recommended for last BMD due to hx of fx but she never took it. Due for repeat now.\par  passed 6 yrs ago on her bday [Mammogramdate] : 8/2021 [PapSmeardate] : 2021 [BoneDensityDate] : 9/2019 [ColonoscopyDate] : 4 yrs ago [Previously active] : previously active [FreeTextEntry2] :

## 2022-06-14 ENCOUNTER — APPOINTMENT (OUTPATIENT)
Dept: INTERNAL MEDICINE | Facility: CLINIC | Age: 77
End: 2022-06-14
Payer: MEDICARE

## 2022-06-14 VITALS
HEIGHT: 64 IN | HEART RATE: 93 BPM | SYSTOLIC BLOOD PRESSURE: 130 MMHG | TEMPERATURE: 98 F | OXYGEN SATURATION: 99 % | BODY MASS INDEX: 29.53 KG/M2 | DIASTOLIC BLOOD PRESSURE: 80 MMHG | WEIGHT: 173 LBS

## 2022-06-14 DIAGNOSIS — M19.90 UNSPECIFIED OSTEOARTHRITIS, UNSPECIFIED SITE: ICD-10-CM

## 2022-06-14 DIAGNOSIS — M25.569 PAIN IN UNSPECIFIED KNEE: ICD-10-CM

## 2022-06-14 LAB
25(OH)D3 SERPL-MCNC: 55.6 NG/ML
ALBUMIN SERPL ELPH-MCNC: 4.7 G/DL
ALP BLD-CCNC: 62 U/L
ALT SERPL-CCNC: 15 U/L
ANION GAP SERPL CALC-SCNC: 10 MMOL/L
AST SERPL-CCNC: 20 U/L
BASOPHILS # BLD AUTO: 0.04 K/UL
BASOPHILS NFR BLD AUTO: 0.7 %
BILIRUB SERPL-MCNC: 0.4 MG/DL
BUN SERPL-MCNC: 15 MG/DL
CALCIUM SERPL-MCNC: 9.2 MG/DL
CHLORIDE SERPL-SCNC: 105 MMOL/L
CHOLEST SERPL-MCNC: 164 MG/DL
CO2 SERPL-SCNC: 24 MMOL/L
CREAT SERPL-MCNC: 0.76 MG/DL
EGFR: 81 ML/MIN/1.73M2
EOSINOPHIL # BLD AUTO: 0.11 K/UL
EOSINOPHIL NFR BLD AUTO: 1.9 %
ESTIMATED AVERAGE GLUCOSE: 111 MG/DL
GLUCOSE SERPL-MCNC: 96 MG/DL
HBA1C MFR BLD HPLC: 5.5 %
HCT VFR BLD CALC: 36.9 %
HDLC SERPL-MCNC: 56 MG/DL
HGB BLD-MCNC: 12 G/DL
IMM GRANULOCYTES NFR BLD AUTO: 0.3 %
LDLC SERPL CALC-MCNC: 83 MG/DL
LYMPHOCYTES # BLD AUTO: 1.62 K/UL
LYMPHOCYTES NFR BLD AUTO: 27.3 %
MAN DIFF?: NORMAL
MCHC RBC-ENTMCNC: 30.6 PG
MCHC RBC-ENTMCNC: 32.5 GM/DL
MCV RBC AUTO: 94.1 FL
MONOCYTES # BLD AUTO: 0.48 K/UL
MONOCYTES NFR BLD AUTO: 8.1 %
NEUTROPHILS # BLD AUTO: 3.67 K/UL
NEUTROPHILS NFR BLD AUTO: 61.7 %
NONHDLC SERPL-MCNC: 109 MG/DL
PLATELET # BLD AUTO: 261 K/UL
POTASSIUM SERPL-SCNC: 4.7 MMOL/L
PROT SERPL-MCNC: 6.9 G/DL
RBC # BLD: 3.92 M/UL
RBC # FLD: 12.9 %
SODIUM SERPL-SCNC: 140 MMOL/L
TRIGL SERPL-MCNC: 128 MG/DL
TSH SERPL-ACNC: 0.96 UIU/ML
VIT B12 SERPL-MCNC: 477 PG/ML
WBC # FLD AUTO: 5.94 K/UL

## 2022-06-14 PROCEDURE — 99214 OFFICE O/P EST MOD 30 MIN: CPT

## 2022-06-14 NOTE — HISTORY OF PRESENT ILLNESS
[FreeTextEntry1] : FU [de-identified] : FATUMA BRISCOE is a 76 year F who comes in for a follow up visit.\par Pt notes she was standing on her bed and leaned on her right leg then started feeling pain behind her left knee and thigh posteriorly for 5 days now. She took Tylenol and Advil which helped. She is afraid to take NSAIDs due to diverticulitis. \par She was in [place] and was admitted for Diverticulitis in February and tx with Flagyl and Cipro as she had a small abscess on ct abdomen, in the hospital for 2 days then d/c. She returned to ED 4 days later and had high wbc and put on Levaquin and Flagyl and was there for another 4 days and then discharged. She did see  on 5/28 and was told to avoid NSAIDs and needs colonoscopy. \par Patient denies any cp, sob,abdominal pain, nausea, vomiting, palpitations, fever, chills, constipation, diarrhea.\par

## 2022-06-14 NOTE — ASSESSMENT
[FreeTextEntry1] : 1.Right posterior knee/thigh pain: likely MSK in nature due to activity and advised to avoid nsaids, will start on medrol dose markus. Went over instructions and side effects of medication.\par \par 2. Hx of diverticulitis; while in [place], obtain records from GI.

## 2022-06-28 ENCOUNTER — APPOINTMENT (OUTPATIENT)
Dept: CARDIOTHORACIC SURGERY | Facility: CLINIC | Age: 77
End: 2022-06-28

## 2022-06-28 VITALS
SYSTOLIC BLOOD PRESSURE: 126 MMHG | BODY MASS INDEX: 29.88 KG/M2 | OXYGEN SATURATION: 98 % | DIASTOLIC BLOOD PRESSURE: 80 MMHG | RESPIRATION RATE: 18 BRPM | WEIGHT: 175 LBS | HEIGHT: 64 IN | HEART RATE: 95 BPM

## 2022-06-28 DIAGNOSIS — K57.90 DIVERTICULOSIS OF INTESTINE, PART UNSPECIFIED, W/OUT PERFORATION OR ABSCESS W/OUT BLEEDING: ICD-10-CM

## 2022-06-28 DIAGNOSIS — Z87.891 PERSONAL HISTORY OF NICOTINE DEPENDENCE: ICD-10-CM

## 2022-06-28 DIAGNOSIS — R06.02 SHORTNESS OF BREATH: ICD-10-CM

## 2022-06-28 PROCEDURE — 99205 OFFICE O/P NEW HI 60 MIN: CPT

## 2022-06-28 RX ORDER — METHYLPREDNISOLONE 4 MG/1
4 TABLET ORAL
Qty: 1 | Refills: 0 | Status: COMPLETED | COMMUNITY
Start: 2022-06-14 | End: 2022-06-28

## 2022-06-29 PROBLEM — R06.02 SHORTNESS OF BREATH ON EXERTION: Status: ACTIVE | Noted: 2022-06-29

## 2022-06-29 NOTE — DATA REVIEWED
[FreeTextEntry1] : Cardiac catheterization at [facility] 10/6/21:\par - CONCLUSIONS:\par - Normal LV systolic function, LVEF 75%\par - Elevated LVEDP  27 mmHg\par - Nonobstructive CAD\par - Aortic valve is moderately calcified\par - Tricuspid Aortic valve\par - 70 mm gradient across the aortic valve\par \par Transthoracic echocardiogram 9/9/21 at [facility]:\par - LVEF 50-55%\par - Aortic Valve: Evidence of severe aortic stenosis.Evidence of severe calcification of the aortic valve. Mild aortic regurgitation. AV Max PG 67 mmHg. Mean PG 42 mmHg. CASS 0.48 cm2.\par - Mitral Valve: Mild MR\par - Pulmonic Valve: Trace pulmonic regurgitation\par - Tricuspid Valve: Trace to mild TR\par - Aorta: Ascending aorta and aortic root appear normal\par - Pulmonary artery: Mild pulmonary hypertension\par - No pericardial effusion\par \par Transthoracic echocardiogram 8/23/2019 at [facility]:\par CONCLUSIONS:\par - EF 55-60%\par - Moderate to severe aortic stenosis. Moderate calcification of the aortic valve. Mild aortic regurgitation. \par AV max PG 44.9 mmHg. AV mean PG 27.2 mmHg. CASS VTI 0.59 cm2\par - Trace MR\par - Trace pulmonic regurgitation\par - Trace TR\par - Normal aortic root and ascending aorta\par \par

## 2022-06-29 NOTE — CONSULT LETTER
[Dear  ___] : Dear  [unfilled], [Consult Letter:] : I had the pleasure of evaluating your patient, [unfilled]. [Please see my note below.] : Please see my note below. [Consult Closing:] : Thank you very much for allowing me to participate in the care of this patient.  If you have any questions, please do not hesitate to contact me. [Sincerely,] : Sincerely, [FreeTextEntry2] : Jesi Shaikh MD [FreeTextEntry3] : Rafa Meadows MD\par \par , Cardiothoracic Surgery\par \par , Cardiovascular and Thoracic Surgery\par \par [facility]\par \par [facility]\par \par [facility]\par \par [address] [place] [address]\[place] \[place] [address]\par \par Tel. [phone]\par \par Fax [phone]\par

## 2022-06-29 NOTE — PHYSICAL EXAM
[General Appearance - Alert] : alert [General Appearance - In No Acute Distress] : in no acute distress [Sclera] : the sclera and conjunctiva were normal [PERRL With Normal Accommodation] : pupils were equal in size, round, and reactive to light [Hearing Threshold Finger Rub Not [place]] : hearing was normal [Neck Appearance] : the appearance of the neck was normal [] : the neck was supple [Respiration, Rhythm And Depth] : normal respiratory rhythm and effort [Auscultation Breath Sounds / Voice Sounds] : lungs were clear to auscultation bilaterally [Heart Rate And Rhythm] : heart rate was normal and rhythm regular [Heart Sounds] : normal S1 and S2 [Examination Of The Chest] : the chest was normal in appearance [2+] : left 2+ [Abdomen Soft] : soft [Abnormal Walk] : normal gait [Skin Color & Pigmentation] : normal skin color and pigmentation [Skin Turgor] : normal skin turgor [No Focal Deficits] : no focal deficits [Oriented To Time, Place, And Person] : oriented to person, place, and time [Impaired Insight] : insight and judgment were intact [Affect] : the affect was normal [FreeTextEntry1] : Grade III/VI systolic murmur

## 2022-06-29 NOTE — ASSESSMENT
[FreeTextEntry1] : I have fully reviewed all available imaging. The most recent transthoracic echocardiogram from 9/9/21 demonstrates an aortic valve area of .48 cm2, consistent with severe aortic stenosis.  is a candidate for TAVR procedure. She will be on plavix for 6 months post procedure and aspirin for the long term. I have explained that it will take some time for her dyspnea on exertion to improve. \par \par I am recommending that she undergo a Transthoracic echocardiogram at [facility], as well as CT Angio chest/abdomen/pelvis (TAVR protocol), after which we will schedule her procedure.\par \par \par The procedure, hospital stay and recovery was discussed in detail. All risks, benefits, and alternatives discussed at length with patient. All questions addressed. Patient would like to proceed with surgical intervention as discussed.\par \par \par PLAN:\par - Transthoracic echocardiogram at [facility]\par - CT Angio chest/abdomen/pelvis TAVR protocol\par - Transcatheter Aortic Valve Replacement\par \par \par I, Manasa Hopson NP, am scribing for and in the presence of Dr. Meadows the following sections HISTORY OF PRESENT ILLNESS, PAST MEDICAL/FAMILY/SOCIAL HISTORY; REVIEW OF SYSTEMS, VITAL SIGNS, PHYSICAL EXAM, ASSESSMENT, PLAN AND DISPOSITION.\par \par  \par \par "I personally performed the services described in the documentation and reviewed the documentation recorded by the scribe in my presence which accurately and completely recorded my words and actions."\par \par  \par \par RODRIGO Sutton-BC\par \par Cardiothoracic Surgery\par \par [facility]\par \par [phone]\par \par \par

## 2022-06-29 NOTE — REVIEW OF SYSTEMS
[As Noted in HPI] : as noted in HPI [SOB on Exertion] : shortness of breath during exertion [Constipation] : constipation [Joint Stiffness] : joint stiffness [Anxiety] : anxiety [Negative] : Heme/Lymph [Orthopnea] : no orthopnea [PND] : no PND [FreeTextEntry9] : Fingers/L knee

## 2022-06-29 NOTE — HISTORY OF PRESENT ILLNESS
[FreeTextEntry1] : Ms. BRISCOE is a 76 year old female referred by Dr. Ennis who presents for consultation regarding calcific aortic stenosis. Her past medical history includes HLD, bilateral carotid artery disease.\par \par She was recently seen in follow up by Dr. Ennis with c/o increasing AMADOR.  TTE 9/2021 revealed Aortic peak gradient of 42 mmHg with estimated aortic valve area of 0.48 cm 2 consistent with severe, possible critical aortic stenosis. Most recent measurements demonstrate aortic valve area 0.6 cm2 and a peak gradient of 70 mmHg with mean gradient 63 mmHg. She was barely able to complete 2 minutes on the treadmill suggesting a decrease in functional capacity.\par \par She is here to discuss candidacy for TAVR procedure. She is not interested in open heart surgery to replace her aortic valve.\par \par Today, she reports she is feeling well overall. She endorses AMADOR with walking up a hill and sometimes with stair climbing as well as brief chest tightness. She states the AMADOR has worsened over time. She denies any dizziness, syncope, fevers/chills.\par \par

## 2022-07-06 ENCOUNTER — OUTPATIENT (OUTPATIENT)
Dept: OUTPATIENT SERVICES | Facility: HOSPITAL | Age: 77
LOS: 1 days | End: 2022-07-06
Payer: MEDICARE

## 2022-07-06 DIAGNOSIS — I35.0 NONRHEUMATIC AORTIC (VALVE) STENOSIS: ICD-10-CM

## 2022-07-06 PROCEDURE — 93306 TTE W/DOPPLER COMPLETE: CPT

## 2022-07-06 PROCEDURE — 93306 TTE W/DOPPLER COMPLETE: CPT | Mod: 26

## 2022-07-07 ENCOUNTER — OUTPATIENT (OUTPATIENT)
Dept: OUTPATIENT SERVICES | Facility: HOSPITAL | Age: 77
LOS: 1 days | End: 2022-07-07
Payer: MEDICARE

## 2022-07-07 ENCOUNTER — APPOINTMENT (OUTPATIENT)
Dept: CT IMAGING | Facility: CLINIC | Age: 77
End: 2022-07-07

## 2022-07-07 ENCOUNTER — RESULT REVIEW (OUTPATIENT)
Age: 77
End: 2022-07-07

## 2022-07-07 DIAGNOSIS — I35.0 NONRHEUMATIC AORTIC (VALVE) STENOSIS: ICD-10-CM

## 2022-07-07 PROCEDURE — 75574 CT ANGIO HRT W/3D IMAGE: CPT | Mod: 26,MH

## 2022-07-07 PROCEDURE — 71275 CT ANGIOGRAPHY CHEST: CPT

## 2022-07-07 PROCEDURE — 74174 CTA ABD&PLVS W/CONTRAST: CPT

## 2022-07-07 PROCEDURE — 75574 CT ANGIO HRT W/3D IMAGE: CPT | Mod: MH

## 2022-07-07 PROCEDURE — 74174 CTA ABD&PLVS W/CONTRAST: CPT | Mod: 26,MH

## 2022-07-07 PROCEDURE — 71275 CT ANGIOGRAPHY CHEST: CPT | Mod: 26,MH

## 2022-07-12 ENCOUNTER — APPOINTMENT (OUTPATIENT)
Dept: CARDIOTHORACIC SURGERY | Facility: CLINIC | Age: 77
End: 2022-07-12

## 2022-07-15 ENCOUNTER — TRANSCRIPTION ENCOUNTER (OUTPATIENT)
Age: 77
End: 2022-07-15

## 2022-07-20 ENCOUNTER — APPOINTMENT (OUTPATIENT)
Dept: RADIOLOGY | Facility: CLINIC | Age: 77
End: 2022-07-20

## 2022-07-20 ENCOUNTER — OUTPATIENT (OUTPATIENT)
Dept: OUTPATIENT SERVICES | Facility: HOSPITAL | Age: 77
LOS: 1 days | End: 2022-07-20
Payer: MEDICARE

## 2022-07-20 DIAGNOSIS — M25.569 PAIN IN UNSPECIFIED KNEE: ICD-10-CM

## 2022-07-20 PROCEDURE — 73560 X-RAY EXAM OF KNEE 1 OR 2: CPT | Mod: 26,RT

## 2022-07-20 PROCEDURE — 73560 X-RAY EXAM OF KNEE 1 OR 2: CPT

## 2022-07-25 ENCOUNTER — RESULT REVIEW (OUTPATIENT)
Age: 77
End: 2022-07-25

## 2022-07-25 ENCOUNTER — OUTPATIENT (OUTPATIENT)
Dept: OUTPATIENT SERVICES | Facility: HOSPITAL | Age: 77
LOS: 1 days | End: 2022-07-25
Payer: MEDICARE

## 2022-07-25 VITALS
HEIGHT: 64 IN | RESPIRATION RATE: 20 BRPM | OXYGEN SATURATION: 99 % | TEMPERATURE: 98 F | WEIGHT: 179.02 LBS | HEART RATE: 90 BPM | SYSTOLIC BLOOD PRESSURE: 145 MMHG | DIASTOLIC BLOOD PRESSURE: 85 MMHG

## 2022-07-25 DIAGNOSIS — Z98.890 OTHER SPECIFIED POSTPROCEDURAL STATES: Chronic | ICD-10-CM

## 2022-07-25 DIAGNOSIS — Z90.710 ACQUIRED ABSENCE OF BOTH CERVIX AND UTERUS: Chronic | ICD-10-CM

## 2022-07-25 DIAGNOSIS — I35.0 NONRHEUMATIC AORTIC (VALVE) STENOSIS: ICD-10-CM

## 2022-07-25 DIAGNOSIS — Z01.818 ENCOUNTER FOR OTHER PREPROCEDURAL EXAMINATION: ICD-10-CM

## 2022-07-25 DIAGNOSIS — Z98.891 HISTORY OF UTERINE SCAR FROM PREVIOUS SURGERY: Chronic | ICD-10-CM

## 2022-07-25 DIAGNOSIS — U07.1 COVID-19: ICD-10-CM

## 2022-07-25 DIAGNOSIS — Z29.9 ENCOUNTER FOR PROPHYLACTIC MEASURES, UNSPECIFIED: ICD-10-CM

## 2022-07-25 DIAGNOSIS — H26.9 UNSPECIFIED CATARACT: Chronic | ICD-10-CM

## 2022-07-25 LAB
A1C WITH ESTIMATED AVERAGE GLUCOSE RESULT: 5.6 % — SIGNIFICANT CHANGE UP (ref 4–5.6)
ALBUMIN SERPL ELPH-MCNC: 4.7 G/DL — SIGNIFICANT CHANGE UP (ref 3.3–5.2)
ALP SERPL-CCNC: 57 U/L — SIGNIFICANT CHANGE UP (ref 40–120)
ALT FLD-CCNC: 16 U/L — SIGNIFICANT CHANGE UP
ANION GAP SERPL CALC-SCNC: 13 MMOL/L — SIGNIFICANT CHANGE UP (ref 5–17)
APPEARANCE UR: CLEAR — SIGNIFICANT CHANGE UP
APTT BLD: 28.9 SEC — SIGNIFICANT CHANGE UP (ref 27.5–35.5)
AST SERPL-CCNC: 19 U/L — SIGNIFICANT CHANGE UP
BACTERIA # UR AUTO: ABNORMAL
BASOPHILS # BLD AUTO: 0.03 K/UL — SIGNIFICANT CHANGE UP (ref 0–0.2)
BASOPHILS NFR BLD AUTO: 0.3 % — SIGNIFICANT CHANGE UP (ref 0–2)
BILIRUB SERPL-MCNC: 0.4 MG/DL — SIGNIFICANT CHANGE UP (ref 0.4–2)
BILIRUB UR-MCNC: NEGATIVE — SIGNIFICANT CHANGE UP
BLD GP AB SCN SERPL QL: SIGNIFICANT CHANGE UP
BUN SERPL-MCNC: 19.1 MG/DL — SIGNIFICANT CHANGE UP (ref 8–20)
CALCIUM SERPL-MCNC: 9.6 MG/DL — SIGNIFICANT CHANGE UP (ref 8.6–10.2)
CHLORIDE SERPL-SCNC: 101 MMOL/L — SIGNIFICANT CHANGE UP (ref 98–107)
CO2 SERPL-SCNC: 23 MMOL/L — SIGNIFICANT CHANGE UP (ref 22–29)
COLOR SPEC: YELLOW — SIGNIFICANT CHANGE UP
CREAT SERPL-MCNC: 0.69 MG/DL — SIGNIFICANT CHANGE UP (ref 0.5–1.3)
DIFF PNL FLD: NEGATIVE — SIGNIFICANT CHANGE UP
EGFR: 89 ML/MIN/1.73M2 — SIGNIFICANT CHANGE UP
EOSINOPHIL # BLD AUTO: 0.26 K/UL — SIGNIFICANT CHANGE UP (ref 0–0.5)
EOSINOPHIL NFR BLD AUTO: 3 % — SIGNIFICANT CHANGE UP (ref 0–6)
EPI CELLS # UR: SIGNIFICANT CHANGE UP
ESTIMATED AVERAGE GLUCOSE: 114 MG/DL — SIGNIFICANT CHANGE UP (ref 68–114)
GLUCOSE SERPL-MCNC: 94 MG/DL — SIGNIFICANT CHANGE UP (ref 70–99)
GLUCOSE UR QL: NEGATIVE MG/DL — SIGNIFICANT CHANGE UP
HCT VFR BLD CALC: 38.9 % — SIGNIFICANT CHANGE UP (ref 34.5–45)
HGB BLD-MCNC: 12.7 G/DL — SIGNIFICANT CHANGE UP (ref 11.5–15.5)
IMM GRANULOCYTES NFR BLD AUTO: 0.6 % — SIGNIFICANT CHANGE UP (ref 0–1.5)
INR BLD: 1.05 RATIO — SIGNIFICANT CHANGE UP (ref 0.88–1.16)
KETONES UR-MCNC: NEGATIVE — SIGNIFICANT CHANGE UP
LEUKOCYTE ESTERASE UR-ACNC: NEGATIVE — SIGNIFICANT CHANGE UP
LYMPHOCYTES # BLD AUTO: 1.39 K/UL — SIGNIFICANT CHANGE UP (ref 1–3.3)
LYMPHOCYTES # BLD AUTO: 16.1 % — SIGNIFICANT CHANGE UP (ref 13–44)
MCHC RBC-ENTMCNC: 31 PG — SIGNIFICANT CHANGE UP (ref 27–34)
MCHC RBC-ENTMCNC: 32.6 GM/DL — SIGNIFICANT CHANGE UP (ref 32–36)
MCV RBC AUTO: 94.9 FL — SIGNIFICANT CHANGE UP (ref 80–100)
MONOCYTES # BLD AUTO: 0.72 K/UL — SIGNIFICANT CHANGE UP (ref 0–0.9)
MONOCYTES NFR BLD AUTO: 8.4 % — SIGNIFICANT CHANGE UP (ref 2–14)
MRSA PCR RESULT.: SIGNIFICANT CHANGE UP
NEUTROPHILS # BLD AUTO: 6.17 K/UL — SIGNIFICANT CHANGE UP (ref 1.8–7.4)
NEUTROPHILS NFR BLD AUTO: 71.6 % — SIGNIFICANT CHANGE UP (ref 43–77)
NITRITE UR-MCNC: NEGATIVE — SIGNIFICANT CHANGE UP
NT-PROBNP SERPL-SCNC: 260 PG/ML — SIGNIFICANT CHANGE UP (ref 0–300)
PH UR: 6.5 — SIGNIFICANT CHANGE UP (ref 5–8)
PLATELET # BLD AUTO: 287 K/UL — SIGNIFICANT CHANGE UP (ref 150–400)
POTASSIUM SERPL-MCNC: 5 MMOL/L — SIGNIFICANT CHANGE UP (ref 3.5–5.3)
POTASSIUM SERPL-SCNC: 5 MMOL/L — SIGNIFICANT CHANGE UP (ref 3.5–5.3)
PREALB SERPL-MCNC: 28 MG/DL — SIGNIFICANT CHANGE UP (ref 18–38)
PROT SERPL-MCNC: 7.6 G/DL — SIGNIFICANT CHANGE UP (ref 6.6–8.7)
PROT UR-MCNC: 15 MG/DL
PROTHROM AB SERPL-ACNC: 12.2 SEC — SIGNIFICANT CHANGE UP (ref 10.5–13.4)
RBC # BLD: 4.1 M/UL — SIGNIFICANT CHANGE UP (ref 3.8–5.2)
RBC # FLD: 13.5 % — SIGNIFICANT CHANGE UP (ref 10.3–14.5)
RBC CASTS # UR COMP ASSIST: NEGATIVE /HPF — SIGNIFICANT CHANGE UP (ref 0–4)
S AUREUS DNA NOSE QL NAA+PROBE: SIGNIFICANT CHANGE UP
SODIUM SERPL-SCNC: 137 MMOL/L — SIGNIFICANT CHANGE UP (ref 135–145)
SP GR SPEC: 1.01 — SIGNIFICANT CHANGE UP (ref 1.01–1.02)
T3 SERPL-MCNC: 83 NG/DL — SIGNIFICANT CHANGE UP (ref 80–200)
T4 AB SER-ACNC: 7.8 UG/DL — SIGNIFICANT CHANGE UP (ref 4.5–12)
TSH SERPL-MCNC: 1.01 UIU/ML — SIGNIFICANT CHANGE UP (ref 0.27–4.2)
UROBILINOGEN FLD QL: NEGATIVE MG/DL — SIGNIFICANT CHANGE UP
WBC # BLD: 8.62 K/UL — SIGNIFICANT CHANGE UP (ref 3.8–10.5)
WBC # FLD AUTO: 8.62 K/UL — SIGNIFICANT CHANGE UP (ref 3.8–10.5)
WBC UR QL: SIGNIFICANT CHANGE UP /HPF (ref 0–5)

## 2022-07-25 PROCEDURE — 71046 X-RAY EXAM CHEST 2 VIEWS: CPT | Mod: 26

## 2022-07-25 PROCEDURE — G0463: CPT

## 2022-07-25 PROCEDURE — 93010 ELECTROCARDIOGRAM REPORT: CPT

## 2022-07-25 PROCEDURE — 93005 ELECTROCARDIOGRAM TRACING: CPT

## 2022-07-25 PROCEDURE — 71046 X-RAY EXAM CHEST 2 VIEWS: CPT

## 2022-07-25 NOTE — H&P PST ADULT - NSICDXPASTMEDICALHX_GEN_ALL_CORE_FT
PAST MEDICAL HISTORY:  Aortic stenosis     HLD (hyperlipidemia)      PAST MEDICAL HISTORY:  Anxiety     Aortic stenosis     Diverticulitis     HLD (hyperlipidemia)     S/P removal of thyroid nodule

## 2022-07-25 NOTE — H&P PST ADULT - NS SC CAGE ALCOHOL ANNOYED YOU
[facility] [address], [place] [address]

Test Date:    2017               Test Time:    11:29:59

Pat Name:     EBCKI ROJAS                Department:   

Patient ID:   [id number]           Room:          

Gender:       F                        Technician:   MONIE

:          1935               Requested By: NIKKIE SUAREZ

Order Number: [id number]            Reading MD:     

                                 Measurements

Intervals                              Axis          

Rate:         59                       P:            

ND:                                    QRS:          2

QRSD:         88                       T:            1

QT:           452                                    

QTc:          452                                    

                           Interpretive Statements

IRREGULAR RHYTHM, NO P-WAVE FOUND

ST & T ABNORMALITY, CONSIDER

INFERIOR ISCHEMIA OR LEFT VENTRICULAR STRAIN

ABNORMAL ECG

RI6.01          Unconfirmed report

No previous ECG available for comparison no

## 2022-07-25 NOTE — H&P PST ADULT - ASSESSMENT
77 yr old female presents with a known  history of aortic stenosis , anxiety , thyroid nodules and  high cholesterol.  pt states she had an echo about 6 yrs ago and she was made aware of stenosis had no symptoms at that time.  Follows with DR. Olson for cardiology. Pt c/o increased dyspnea since fall , shortness of breath with stairs and walking uphill, stress test done  pt states she could not complete. Cardiac cath done 21 at [facility]   pt states no blockage but stenosis was worse.  Echo 22 showed severe aortic stenosis ,EF 60 %  ct scan done 22 andr diverticulitis in descending colon noted , pt followed with GI DR. Chang and was treated with antibiotics, pt was in [facility] in 2022 for diverticulitis , denies any current abdominal pain. Pt is scheduled for  TAVR on 8/3/22 with Dr. Garcia.  Pre op pCR to be obtained , pt is vaccinated X4 with Pfizer . Email sent to cardiology team regarding pt PCN allergy.  OPIOID RISK TOOL    ELLEN EACH BOX THAT APPLIES AND ADD TOTALS AT THE END    FAMILY HISTORY OF SUBSTANCE ABUSE                 FEMALE         MALE                                                Alcohol                             [  ]1 pt          [  ]3pts                                               Illegal Durgs                     [  ]2 pts        [  ]3pts                                               Rx Drugs                           [  ]4 pts        [  ]4 pts    PERSONAL HISTORY OF SUBSTANCE ABUSE                                                                                          Alcohol                             [  ]3 pts       [  ]3 pts                                               Illegal Durgs                     [  ]4 pts        [  ]4 pts                                               Rx Drugs                           [  ]5 pts        [  ]5 pts    AGE BETWEEN 16-45 YEARS                                      [  ]1 pt         [  ]1 pt    HISTORY OF PREADOLESCENT   SEXUAL ABUSE                                                             [  ]3 pts        [  ]0pts    PSYCHOLOGICAL DISEASE                     ADD, OCD, Bipolar, Schizophrenia        [  ]2 pts         [  ]2 pts                      Depression                                               [  ]1 pt           [  ]1 pt           SCORING TOTAL   (add numbers and type here)              (*0**)                                     A score of 3 or lower indicated LOW risk for future opiod abuse  A score of 4 to 7 indicated moderate risk for future opiod abuse  A score of 8 or higher indicates a high risk for opiod abuse  CAPRINI VTE 2.0 SCORE [CLOT updated 2019]    AGE RELATED RISK FACTORS                                                       MOBILITY RELATED FACTORS  [ ] Age 41-60 years                                            (1 Point)                    [ ] Bed rest                                                        (1 Point)  [ ] Age: 61-74 years                                           (2 Points)                  [ ] Plaster cast                                                   (2 Points)  [X ] Age= 75 years                                              (3 Points)                    [ ] Bed bound for more than 72 hours                 (2 Points)    DISEASE RELATED RISK FACTORS                                               GENDER SPECIFIC FACTORS  [ ] Edema in the lower extremities                       (1 Point)              [ ] Pregnancy                                                     (1 Point)  [ ] Varicose veins                                               (1 Point)                     [ ] Post-partum < 6 weeks                                   (1 Point)             [X ] BMI > 25 Kg/m2                                            (1 Point)                     [ ] Hormonal therapy  or oral contraception          (1 Point)                 [ ] Sepsis (in the previous month)                        (1 Point)               [ ] History of pregnancy complications                 (1 point)  [ ] Pneumonia or serious lung disease                                               [ ] Unexplained or recurrent                     (1 Point)           (in the previous month)                               (1 Point)  [ ] Abnormal pulmonary function test                     (1 Point)                 SURGERY RELATED RISK FACTORS  [ ] Acute myocardial infarction                              (1 Point)               [ ]  Section                                             (1 Point)  [ ] Congestive heart failure (in the previous month)  (1 Point)      [ ] Minor surgery                                                  (1 Point)   [ ] Inflammatory bowel disease                             (1 Point)               [ ] Arthroscopic surgery                                        (2 Points)  [ ] Central venous access                                      (2 Points)                [X ] General surgery lasting more than 45 minutes (2 points)  [ ] Malignancy- Present or previous                   (2 Points)                [ ] Elective arthroplasty                                         (5 points)    [ ] Stroke (in the previous month)                          (5 Points)                                                                                                                                                           HEMATOLOGY RELATED FACTORS                                                 TRAUMA RELATED RISK FACTORS  [ ] Prior episodes of VTE                                     (3 Points)                [ ] Fracture of the hip, pelvis, or leg                       (5 Points)  [ ] Positive family history for VTE                         (3 Points)             [ ] Acute spinal cord injury (in the previous month)  (5 Points)  [ ] Prothrombin 87320 A                                     (3 Points)               [ ] Paralysis  (less than 1 month)                             (5 Points)  [ ] Factor V Leiden                                             (3 Points)                  [ ] Multiple Trauma within 1 month                        (5 Points)  [ ] Lupus anticoagulants                                     (3 Points)                                                           [ ] Anticardiolipin antibodies                               (3 Points)                                                       [ ] High homocysteine in the blood                      (3 Points)                                             [ ] Other congenital or acquired thrombophilia      (3 Points)                                                [ ] Heparin induced thrombocytopenia                  (3 Points)                                     Total Score [    6      ] 77 yr old female presents with a known  history of aortic stenosis , anxiety , thyroid nodules and  high cholesterol.  pt states she had an echo about 6 yrs ago and she was made aware of stenosis had no symptoms at that time.  Follows with DR. Olson for cardiology. Pt c/o increased dyspnea since fall , shortness of breath with stairs and walking uphill, stress test done  pt states she could not complete. Cardiac cath done 21 at [facility]   pt states no blockage but stenosis was worse.  Echo 22 showed severe aortic stenosis ,EF 60 %  ct scan done 22 andr diverticulitis in descending colon noted , pt followed with GI DR. Chang and was treated with antibiotics, pt was in [facility] in 2022 for diverticulitis , denies any current abdominal pain. Pt is scheduled for  TAVR on 8/3/22 with Dr. Garcia.  Pre op pCR to be obtained , pt is vaccinated X4 with Pfizer .  OPIOID RISK TOOL    ELLEN EACH BOX THAT APPLIES AND ADD TOTALS AT THE END    FAMILY HISTORY OF SUBSTANCE ABUSE                 FEMALE         MALE                                                Alcohol                             [  ]1 pt          [  ]3pts                                               Illegal Durgs                     [  ]2 pts        [  ]3pts                                               Rx Drugs                           [  ]4 pts        [  ]4 pts    PERSONAL HISTORY OF SUBSTANCE ABUSE                                                                                          Alcohol                             [  ]3 pts       [  ]3 pts                                               Illegal Durgs                     [  ]4 pts        [  ]4 pts                                               Rx Drugs                           [  ]5 pts        [  ]5 pts    AGE BETWEEN 16-45 YEARS                                      [  ]1 pt         [  ]1 pt    HISTORY OF PREADOLESCENT   SEXUAL ABUSE                                                             [  ]3 pts        [  ]0pts    PSYCHOLOGICAL DISEASE                     ADD, OCD, Bipolar, Schizophrenia        [  ]2 pts         [  ]2 pts                      Depression                                               [  ]1 pt           [  ]1 pt           SCORING TOTAL   (add numbers and type here)              (*0**)                                     A score of 3 or lower indicated LOW risk for future opiod abuse  A score of 4 to 7 indicated moderate risk for future opiod abuse  A score of 8 or higher indicates a high risk for opiod abuse  CAPRINI VTE 2.0 SCORE [CLOT updated 2019]    AGE RELATED RISK FACTORS                                                       MOBILITY RELATED FACTORS  [ ] Age 41-60 years                                            (1 Point)                    [ ] Bed rest                                                        (1 Point)  [ ] Age: 61-74 years                                           (2 Points)                  [ ] Plaster cast                                                   (2 Points)  [X ] Age= 75 years                                              (3 Points)                    [ ] Bed bound for more than 72 hours                 (2 Points)    DISEASE RELATED RISK FACTORS                                               GENDER SPECIFIC FACTORS  [ ] Edema in the lower extremities                       (1 Point)              [ ] Pregnancy                                                     (1 Point)  [ ] Varicose veins                                               (1 Point)                     [ ] Post-partum < 6 weeks                                   (1 Point)             [X ] BMI > 25 Kg/m2                                            (1 Point)                     [ ] Hormonal therapy  or oral contraception          (1 Point)                 [ ] Sepsis (in the previous month)                        (1 Point)               [ ] History of pregnancy complications                 (1 point)  [ ] Pneumonia or serious lung disease                                               [ ] Unexplained or recurrent                     (1 Point)           (in the previous month)                               (1 Point)  [ ] Abnormal pulmonary function test                     (1 Point)                 SURGERY RELATED RISK FACTORS  [ ] Acute myocardial infarction                              (1 Point)               [ ]  Section                                             (1 Point)  [ ] Congestive heart failure (in the previous month)  (1 Point)      [ ] Minor surgery                                                  (1 Point)   [ ] Inflammatory bowel disease                             (1 Point)               [ ] Arthroscopic surgery                                        (2 Points)  [ ] Central venous access                                      (2 Points)                [X ] General surgery lasting more than 45 minutes (2 points)  [ ] Malignancy- Present or previous                   (2 Points)                [ ] Elective arthroplasty                                         (5 points)    [ ] Stroke (in the previous month)                          (5 Points)                                                                                                                                                           HEMATOLOGY RELATED FACTORS                                                 TRAUMA RELATED RISK FACTORS  [ ] Prior episodes of VTE                                     (3 Points)                [ ] Fracture of the hip, pelvis, or leg                       (5 Points)  [ ] Positive family history for VTE                         (3 Points)             [ ] Acute spinal cord injury (in the previous month)  (5 Points)  [ ] Prothrombin 11318 A                                     (3 Points)               [ ] Paralysis  (less than 1 month)                             (5 Points)  [ ] Factor V Leiden                                             (3 Points)                  [ ] Multiple Trauma within 1 month                        (5 Points)  [ ] Lupus anticoagulants                                     (3 Points)                                                           [ ] Anticardiolipin antibodies                               (3 Points)                                                       [ ] High homocysteine in the blood                      (3 Points)                                             [ ] Other congenital or acquired thrombophilia      (3 Points)                                                [ ] Heparin induced thrombocytopenia                  (3 Points)                                     Total Score [    6      ]

## 2022-07-25 NOTE — H&P PST ADULT - NSICDXFAMILYHX_GEN_ALL_CORE_FT
FAMILY HISTORY:  Father  Still living? No  Family history of angina, Age at diagnosis: Age Unknown    Child  Still living? Yes, Estimated age: Age Unknown  Family history of thyroid cancer, Age at diagnosis: Age Unknown

## 2022-07-25 NOTE — H&P PST ADULT - HISTORY OF PRESENT ILLNESS
77 yr old female presents with a known 6 yr history of aortic stenosis .  77 yr old female presents with a known  history of aortic stenosis , anxiety , thyroid nodules and  high cholesterol.  pt states she had an echo about 6 yrs ago and she was made aware. Follows with DR. Olson for cardiology. Pt c/o increased dyspnea since fall 2021, shortness of breath with stairs and walking uphill, stress test done Nov 2021and pt states she could not complete. Cardiac cath done 11/2/21 at [facility]   pt states no blockage but stenosis was worse.  Echo 7/6/22 showed severe aortic stenosis ,EF 60 %  ct scan done 7/7/22 andr diverticulitis in descending colon noted , pt followed with GI DR. Chang and was treated with antibiotics, pt was in [facility] in Feb 2022 for diverticulitis , denies any current abdominal pain. Pt is scheduled for  TAVR on 8/3/22 with Dr. Garcia.

## 2022-07-25 NOTE — H&P PST ADULT - NSICDXPASTSURGICALHX_GEN_ALL_CORE_FT
PAST SURGICAL HISTORY:  History of arthroscopy left knee    History of cardiac cath     S/P  section     S/P thyroid surgery partial removal nodule     PAST SURGICAL HISTORY:  Cataract     H/O: hysterectomy     History of arthroscopy left knee    History of cardiac cath     S/P  section     S/P thyroid surgery partial removal nodule

## 2022-07-26 LAB
CULTURE RESULTS: SIGNIFICANT CHANGE UP
SPECIMEN SOURCE: SIGNIFICANT CHANGE UP

## 2022-08-02 NOTE — ASU PATIENT PROFILE, ADULT - NSICDXPASTSURGICALHX_GEN_ALL_CORE_FT
PAST SURGICAL HISTORY:  Cataract     H/O: hysterectomy     History of arthroscopy left knee    History of cardiac cath     S/P  section     S/P thyroid surgery partial removal nodule

## 2022-08-02 NOTE — ASU PATIENT PROFILE, ADULT - NSICDXPASTMEDICALHX_GEN_ALL_CORE_FT
PAST MEDICAL HISTORY:  Anxiety     Aortic stenosis     Diverticulitis     HLD (hyperlipidemia)     S/P removal of thyroid nodule

## 2022-08-03 ENCOUNTER — INPATIENT (INPATIENT)
Facility: HOSPITAL | Age: 77
LOS: 0 days | Discharge: HOME | DRG: 267 | End: 2022-08-04
Attending: THORACIC SURGERY (CARDIOTHORACIC VASCULAR SURGERY) | Admitting: THORACIC SURGERY (CARDIOTHORACIC VASCULAR SURGERY)
Payer: MEDICARE

## 2022-08-03 ENCOUNTER — APPOINTMENT (OUTPATIENT)
Dept: CARDIOTHORACIC SURGERY | Facility: CLINIC | Age: 77
End: 2022-08-03

## 2022-08-03 ENCOUNTER — TRANSCRIPTION ENCOUNTER (OUTPATIENT)
Age: 77
End: 2022-08-03

## 2022-08-03 ENCOUNTER — APPOINTMENT (OUTPATIENT)
Dept: CARDIOTHORACIC SURGERY | Facility: HOSPITAL | Age: 77
End: 2022-08-03

## 2022-08-03 VITALS
DIASTOLIC BLOOD PRESSURE: 80 MMHG | TEMPERATURE: 98 F | OXYGEN SATURATION: 97 % | WEIGHT: 178.57 LBS | RESPIRATION RATE: 15 BRPM | HEIGHT: 64 IN | SYSTOLIC BLOOD PRESSURE: 162 MMHG | HEART RATE: 88 BPM

## 2022-08-03 DIAGNOSIS — I35.0 NONRHEUMATIC AORTIC (VALVE) STENOSIS: ICD-10-CM

## 2022-08-03 DIAGNOSIS — Z90.710 ACQUIRED ABSENCE OF BOTH CERVIX AND UTERUS: Chronic | ICD-10-CM

## 2022-08-03 DIAGNOSIS — Z98.890 OTHER SPECIFIED POSTPROCEDURAL STATES: Chronic | ICD-10-CM

## 2022-08-03 DIAGNOSIS — H26.9 UNSPECIFIED CATARACT: Chronic | ICD-10-CM

## 2022-08-03 DIAGNOSIS — Z01.818 ENCOUNTER FOR OTHER PREPROCEDURAL EXAMINATION: ICD-10-CM

## 2022-08-03 DIAGNOSIS — Z98.891 HISTORY OF UTERINE SCAR FROM PREVIOUS SURGERY: Chronic | ICD-10-CM

## 2022-08-03 PROBLEM — F41.9 ANXIETY DISORDER, UNSPECIFIED: Chronic | Status: ACTIVE | Noted: 2022-07-25

## 2022-08-03 PROBLEM — K57.92 DIVERTICULITIS OF INTESTINE, PART UNSPECIFIED, WITHOUT PERFORATION OR ABSCESS WITHOUT BLEEDING: Chronic | Status: ACTIVE | Noted: 2022-07-25

## 2022-08-03 LAB
ALBUMIN SERPL ELPH-MCNC: 3.5 G/DL — SIGNIFICANT CHANGE UP (ref 3.3–5.2)
ALP SERPL-CCNC: 50 U/L — SIGNIFICANT CHANGE UP (ref 40–120)
ALT FLD-CCNC: 12 U/L — SIGNIFICANT CHANGE UP
ANION GAP SERPL CALC-SCNC: 12 MMOL/L — SIGNIFICANT CHANGE UP (ref 5–17)
ANISOCYTOSIS BLD QL: SLIGHT — SIGNIFICANT CHANGE UP
APTT BLD: 28.2 SEC — SIGNIFICANT CHANGE UP (ref 27.5–35.5)
AST SERPL-CCNC: 17 U/L — SIGNIFICANT CHANGE UP
BASOPHILS # BLD AUTO: 0.06 K/UL — SIGNIFICANT CHANGE UP (ref 0–0.2)
BASOPHILS NFR BLD AUTO: 0.8 % — SIGNIFICANT CHANGE UP (ref 0–2)
BILIRUB SERPL-MCNC: 0.4 MG/DL — SIGNIFICANT CHANGE UP (ref 0.4–2)
BUN SERPL-MCNC: 14.7 MG/DL — SIGNIFICANT CHANGE UP (ref 8–20)
CALCIUM SERPL-MCNC: 8.8 MG/DL — SIGNIFICANT CHANGE UP (ref 8.4–10.5)
CHLORIDE SERPL-SCNC: 105 MMOL/L — SIGNIFICANT CHANGE UP (ref 98–107)
CK SERPL-CCNC: 106 U/L — SIGNIFICANT CHANGE UP (ref 25–170)
CO2 SERPL-SCNC: 22 MMOL/L — SIGNIFICANT CHANGE UP (ref 22–29)
CREAT SERPL-MCNC: 0.53 MG/DL — SIGNIFICANT CHANGE UP (ref 0.5–1.3)
EGFR: 95 ML/MIN/1.73M2 — SIGNIFICANT CHANGE UP
EOSINOPHIL # BLD AUTO: 0.34 K/UL — SIGNIFICANT CHANGE UP (ref 0–0.5)
EOSINOPHIL NFR BLD AUTO: 4.3 % — SIGNIFICANT CHANGE UP (ref 0–6)
GAS PNL BLDA: SIGNIFICANT CHANGE UP
GIANT PLATELETS BLD QL SMEAR: PRESENT — SIGNIFICANT CHANGE UP
GLUCOSE SERPL-MCNC: 153 MG/DL — HIGH (ref 70–99)
HCT VFR BLD CALC: 32.9 % — LOW (ref 34.5–45)
HGB BLD-MCNC: 10.8 G/DL — LOW (ref 11.5–15.5)
INR BLD: 1.18 RATIO — HIGH (ref 0.88–1.16)
LYMPHOCYTES # BLD AUTO: 0.95 K/UL — LOW (ref 1–3.3)
LYMPHOCYTES # BLD AUTO: 12.2 % — LOW (ref 13–44)
MAGNESIUM SERPL-MCNC: 1.6 MG/DL — SIGNIFICANT CHANGE UP (ref 1.6–2.6)
MANUAL SMEAR VERIFICATION: SIGNIFICANT CHANGE UP
MCHC RBC-ENTMCNC: 31.3 PG — SIGNIFICANT CHANGE UP (ref 27–34)
MCHC RBC-ENTMCNC: 32.8 GM/DL — SIGNIFICANT CHANGE UP (ref 32–36)
MCV RBC AUTO: 95.4 FL — SIGNIFICANT CHANGE UP (ref 80–100)
MICROCYTES BLD QL: SLIGHT — SIGNIFICANT CHANGE UP
MONOCYTES # BLD AUTO: 0.27 K/UL — SIGNIFICANT CHANGE UP (ref 0–0.9)
MONOCYTES NFR BLD AUTO: 3.5 % — SIGNIFICANT CHANGE UP (ref 2–14)
MYELOCYTES NFR BLD: 0.9 % — HIGH (ref 0–0)
NEUTROPHILS # BLD AUTO: 6.05 K/UL — SIGNIFICANT CHANGE UP (ref 1.8–7.4)
NEUTROPHILS NFR BLD AUTO: 73.9 % — SIGNIFICANT CHANGE UP (ref 43–77)
NEUTS BAND # BLD: 3.5 % — SIGNIFICANT CHANGE UP (ref 0–8)
PLAT MORPH BLD: NORMAL — SIGNIFICANT CHANGE UP
PLATELET # BLD AUTO: 155 K/UL — SIGNIFICANT CHANGE UP (ref 150–400)
POLYCHROMASIA BLD QL SMEAR: SLIGHT — SIGNIFICANT CHANGE UP
POTASSIUM SERPL-MCNC: 3.7 MMOL/L — SIGNIFICANT CHANGE UP (ref 3.5–5.3)
POTASSIUM SERPL-SCNC: 3.7 MMOL/L — SIGNIFICANT CHANGE UP (ref 3.5–5.3)
PROT SERPL-MCNC: 6 G/DL — LOW (ref 6.6–8.7)
PROTHROM AB SERPL-ACNC: 13.7 SEC — HIGH (ref 10.5–13.4)
RBC # BLD: 3.45 M/UL — LOW (ref 3.8–5.2)
RBC # FLD: 13.2 % — SIGNIFICANT CHANGE UP (ref 10.3–14.5)
RBC BLD AUTO: ABNORMAL
SODIUM SERPL-SCNC: 139 MMOL/L — SIGNIFICANT CHANGE UP (ref 135–145)
TROPONIN T SERPL-MCNC: <0.01 NG/ML — SIGNIFICANT CHANGE UP (ref 0–0.06)
VARIANT LYMPHS # BLD: 0.9 % — SIGNIFICANT CHANGE UP (ref 0–6)
WBC # BLD: 7.82 K/UL — SIGNIFICANT CHANGE UP (ref 3.8–10.5)
WBC # FLD AUTO: 7.82 K/UL — SIGNIFICANT CHANGE UP (ref 3.8–10.5)

## 2022-08-03 PROCEDURE — 99291 CRITICAL CARE FIRST HOUR: CPT

## 2022-08-03 PROCEDURE — MCOT1: CPT | Mod: NC

## 2022-08-03 PROCEDURE — 93010 ELECTROCARDIOGRAM REPORT: CPT

## 2022-08-03 PROCEDURE — 33362 REPLACE AORTIC VALVE OPEN: CPT | Mod: Q0,62

## 2022-08-03 PROCEDURE — 71045 X-RAY EXAM CHEST 1 VIEW: CPT | Mod: 26

## 2022-08-03 PROCEDURE — 93306 TTE W/DOPPLER COMPLETE: CPT | Mod: 26

## 2022-08-03 PROCEDURE — 93355 ECHO TRANSESOPHAGEAL (TEE): CPT

## 2022-08-03 DEVICE — KIT A-LINE 1LUM 20GX12CM SAFE KIT: Type: IMPLANTABLE DEVICE | Status: FUNCTIONAL

## 2022-08-03 DEVICE — LIGATING CLIPS AESCULAP SMALL WIDE 24: Type: IMPLANTABLE DEVICE | Status: FUNCTIONAL

## 2022-08-03 DEVICE — VLV HEART SAPIEN 3 ULTRA W/COMMANDER SYS 26MM: Type: IMPLANTABLE DEVICE | Status: FUNCTIONAL

## 2022-08-03 DEVICE — CATH BALLOON IAB 34CC X7.5FR: Type: IMPLANTABLE DEVICE | Status: FUNCTIONAL

## 2022-08-03 DEVICE — SEALANT FLOSEAL FAST PREP HEMOSTATIC MATRIX 10ML: Type: IMPLANTABLE DEVICE | Status: FUNCTIONAL

## 2022-08-03 DEVICE — INTRO SHEATH PINN PERIPH 6X10 MINI GWIRE: Type: IMPLANTABLE DEVICE | Status: FUNCTIONAL

## 2022-08-03 DEVICE — INTRO SHEATH PINN PERIPH 5X10 MINI GWIRE: Type: IMPLANTABLE DEVICE | Status: FUNCTIONAL

## 2022-08-03 DEVICE — CATH THERMODIL PACE 7.5FR: Type: IMPLANTABLE DEVICE | Status: FUNCTIONAL

## 2022-08-03 RX ORDER — CLOPIDOGREL BISULFATE 75 MG/1
75 TABLET, FILM COATED ORAL ONCE
Refills: 0 | Status: COMPLETED | OUTPATIENT
Start: 2022-08-03 | End: 2022-08-03

## 2022-08-03 RX ORDER — HYDRALAZINE HCL 50 MG
10 TABLET ORAL ONCE
Refills: 0 | Status: COMPLETED | OUTPATIENT
Start: 2022-08-03 | End: 2022-08-03

## 2022-08-03 RX ORDER — POTASSIUM CHLORIDE 20 MEQ
10 PACKET (EA) ORAL
Refills: 0 | Status: DISCONTINUED | OUTPATIENT
Start: 2022-08-03 | End: 2022-08-04

## 2022-08-03 RX ORDER — LORATADINE 10 MG/1
10 TABLET ORAL ONCE
Refills: 0 | Status: DISCONTINUED | OUTPATIENT
Start: 2022-08-03 | End: 2022-08-04

## 2022-08-03 RX ORDER — MAGNESIUM SULFATE 500 MG/ML
2 VIAL (ML) INJECTION
Refills: 0 | Status: COMPLETED | OUTPATIENT
Start: 2022-08-03 | End: 2022-08-03

## 2022-08-03 RX ORDER — CEFUROXIME AXETIL 250 MG
1500 TABLET ORAL EVERY 8 HOURS
Refills: 0 | Status: DISCONTINUED | OUTPATIENT
Start: 2022-08-03 | End: 2022-08-04

## 2022-08-03 RX ORDER — SODIUM CHLORIDE 9 MG/ML
1000 INJECTION INTRAMUSCULAR; INTRAVENOUS; SUBCUTANEOUS
Refills: 0 | Status: DISCONTINUED | OUTPATIENT
Start: 2022-08-03 | End: 2022-08-04

## 2022-08-03 RX ORDER — CLOPIDOGREL BISULFATE 75 MG/1
75 TABLET, FILM COATED ORAL DAILY
Refills: 0 | Status: DISCONTINUED | OUTPATIENT
Start: 2022-08-04 | End: 2022-08-04

## 2022-08-03 RX ORDER — NOREPINEPHRINE BITARTRATE/D5W 8 MG/250ML
0.05 PLASTIC BAG, INJECTION (ML) INTRAVENOUS
Qty: 8 | Refills: 0 | Status: DISCONTINUED | OUTPATIENT
Start: 2022-08-03 | End: 2022-08-04

## 2022-08-03 RX ORDER — VANCOMYCIN HCL 1 G
1250 VIAL (EA) INTRAVENOUS ONCE
Refills: 0 | Status: COMPLETED | OUTPATIENT
Start: 2022-08-03 | End: 2022-08-03

## 2022-08-03 RX ORDER — SODIUM CHLORIDE 9 MG/ML
3 INJECTION INTRAMUSCULAR; INTRAVENOUS; SUBCUTANEOUS EVERY 8 HOURS
Refills: 0 | Status: DISCONTINUED | OUTPATIENT
Start: 2022-08-03 | End: 2022-08-03

## 2022-08-03 RX ORDER — ACETAMINOPHEN 500 MG
1000 TABLET ORAL ONCE
Refills: 0 | Status: COMPLETED | OUTPATIENT
Start: 2022-08-03 | End: 2022-08-03

## 2022-08-03 RX ORDER — PANTOPRAZOLE SODIUM 20 MG/1
40 TABLET, DELAYED RELEASE ORAL DAILY
Refills: 0 | Status: DISCONTINUED | OUTPATIENT
Start: 2022-08-04 | End: 2022-08-04

## 2022-08-03 RX ORDER — LEVOTHYROXINE SODIUM 125 MCG
75 TABLET ORAL DAILY
Refills: 0 | Status: DISCONTINUED | OUTPATIENT
Start: 2022-08-04 | End: 2022-08-04

## 2022-08-03 RX ORDER — ALPRAZOLAM 0.25 MG
0.25 TABLET ORAL ONCE
Refills: 0 | Status: DISCONTINUED | OUTPATIENT
Start: 2022-08-03 | End: 2022-08-03

## 2022-08-03 RX ORDER — CHLORHEXIDINE GLUCONATE 213 G/1000ML
5 SOLUTION TOPICAL
Refills: 0 | Status: DISCONTINUED | OUTPATIENT
Start: 2022-08-03 | End: 2022-08-04

## 2022-08-03 RX ORDER — ONDANSETRON 8 MG/1
4 TABLET, FILM COATED ORAL ONCE
Refills: 0 | Status: COMPLETED | OUTPATIENT
Start: 2022-08-03 | End: 2022-08-03

## 2022-08-03 RX ORDER — ATORVASTATIN CALCIUM 80 MG/1
20 TABLET, FILM COATED ORAL AT BEDTIME
Refills: 0 | Status: DISCONTINUED | OUTPATIENT
Start: 2022-08-03 | End: 2022-08-04

## 2022-08-03 RX ORDER — ASPIRIN/CALCIUM CARB/MAGNESIUM 324 MG
81 TABLET ORAL ONCE
Refills: 0 | Status: COMPLETED | OUTPATIENT
Start: 2022-08-03 | End: 2022-08-03

## 2022-08-03 RX ORDER — PANTOPRAZOLE SODIUM 20 MG/1
40 TABLET, DELAYED RELEASE ORAL ONCE
Refills: 0 | Status: COMPLETED | OUTPATIENT
Start: 2022-08-03 | End: 2022-08-03

## 2022-08-03 RX ORDER — POTASSIUM CHLORIDE 20 MEQ
10 PACKET (EA) ORAL
Refills: 0 | Status: COMPLETED | OUTPATIENT
Start: 2022-08-03 | End: 2022-08-03

## 2022-08-03 RX ORDER — SERTRALINE 25 MG/1
50 TABLET, FILM COATED ORAL DAILY
Refills: 0 | Status: DISCONTINUED | OUTPATIENT
Start: 2022-08-04 | End: 2022-08-04

## 2022-08-03 RX ORDER — ASPIRIN/CALCIUM CARB/MAGNESIUM 324 MG
81 TABLET ORAL DAILY
Refills: 0 | Status: DISCONTINUED | OUTPATIENT
Start: 2022-08-04 | End: 2022-08-04

## 2022-08-03 RX ADMIN — Medication 100 MILLIEQUIVALENT(S): at 10:07

## 2022-08-03 RX ADMIN — Medication 0.25 MILLIGRAM(S): at 21:47

## 2022-08-03 RX ADMIN — Medication 100 MILLIEQUIVALENT(S): at 11:46

## 2022-08-03 RX ADMIN — Medication 400 MILLIGRAM(S): at 23:45

## 2022-08-03 RX ADMIN — CLOPIDOGREL BISULFATE 75 MILLIGRAM(S): 75 TABLET, FILM COATED ORAL at 15:37

## 2022-08-03 RX ADMIN — Medication 10 MILLIGRAM(S): at 15:12

## 2022-08-03 RX ADMIN — Medication 100 MILLIEQUIVALENT(S): at 11:00

## 2022-08-03 RX ADMIN — Medication 100 MILLIGRAM(S): at 17:07

## 2022-08-03 RX ADMIN — Medication 25 GRAM(S): at 14:04

## 2022-08-03 RX ADMIN — Medication 81 MILLIGRAM(S): at 15:36

## 2022-08-03 RX ADMIN — PANTOPRAZOLE SODIUM 40 MILLIGRAM(S): 20 TABLET, DELAYED RELEASE ORAL at 11:14

## 2022-08-03 RX ADMIN — Medication 10 MILLIGRAM(S): at 18:45

## 2022-08-03 RX ADMIN — Medication 100 MILLIGRAM(S): at 23:49

## 2022-08-03 RX ADMIN — Medication 25 GRAM(S): at 15:39

## 2022-08-03 RX ADMIN — ONDANSETRON 4 MILLIGRAM(S): 8 TABLET, FILM COATED ORAL at 23:49

## 2022-08-03 NOTE — BRIEF OPERATIVE NOTE - OPERATION/FINDINGS
Severe AS, Post Deployment Mild PVL (mean gradient 7 mmHg & CASS 2.09) with New Transient Left Bundle Branch Block, Extubated in the OR

## 2022-08-03 NOTE — BRIEF OPERATIVE NOTE - NSICDXBRIEFPROCEDURE_GEN_ALL_CORE_FT
PROCEDURES:  TAVR, open femoral artery approach 03-Aug-2022 09:29:13 Transfemoral TAVR via Right Common Femoral Artery Cutdown (26mm Maggie 3 Ultra) (NCT# [id number]) (STS/ACC TVT Registry Patient ID# [id number]) Oscar Tabares

## 2022-08-03 NOTE — CHART NOTE - NSCHARTNOTEFT_GEN_A_CORE
----- Message from Anson Coppola RN sent at 10/12/2021 11:19 AM EDT -----  Regarding: FW: /Telephone    ----- Message -----  From: Kriss Chiang  Sent: 10/12/2021  10:54 AM EDT  To: [facility] Nurse Pool  Subject: /Telephone                               General Message/Vendor Calls         Caller's first and last name: Self              Reason for call: Pt needs returned call from  about pain in hand               Callback required yes/no and why: Yes               Best contact number(s): [phone]              Details to clarify the request:Pt needs returned call from  about pain in hand                   [employer] 26mm Bella Maggie 3 Ultra Transfemoral TAVR via Right Common Femoral Artery Cutdown.  NCT# [id number], STS/ACC TVT Registry Patient ID# [id number].

## 2022-08-03 NOTE — BRIEF OPERATIVE NOTE - NSICDXBRIEFPOSTOP_GEN_ALL_CORE_FT
POST-OP DIAGNOSIS:  Severe aortic stenosis 03-Aug-2022 09:28:33  Oscar Tabares  Chronic diastolic CHF (congestive heart failure), NYHA class 2 03-Aug-2022 09:28:50  Oscar Tabares  Left bundle branch block 03-Aug-2022 09:29:01  Oscar Tabares

## 2022-08-03 NOTE — BRIEF OPERATIVE NOTE - NSICDXBRIEFPREOP_GEN_ALL_CORE_FT
PRE-OP DIAGNOSIS:  Severe aortic stenosis 03-Aug-2022 09:28:05  Oscar Tabares  Chronic diastolic CHF (congestive heart failure), NYHA class 2 03-Aug-2022 09:28:20  Oscar Tabares

## 2022-08-03 NOTE — BRIEF OPERATIVE NOTE - COMMENTS
[employer] Representative: Merlin Gray (clinical support)  Invasive Lines: Left Radial Arterial Line, Left Femoral Venous Sheath  IV Medication Infusions: None

## 2022-08-04 ENCOUNTER — TRANSCRIPTION ENCOUNTER (OUTPATIENT)
Age: 77
End: 2022-08-04

## 2022-08-04 VITALS
SYSTOLIC BLOOD PRESSURE: 115 MMHG | DIASTOLIC BLOOD PRESSURE: 57 MMHG | OXYGEN SATURATION: 97 % | RESPIRATION RATE: 26 BRPM | HEART RATE: 84 BPM

## 2022-08-04 DIAGNOSIS — E03.9 HYPOTHYROIDISM, UNSPECIFIED: ICD-10-CM

## 2022-08-04 PROBLEM — Z01.818 PREOP TESTING: Status: RESOLVED | Noted: 2021-10-10 | Resolved: 2022-08-04

## 2022-08-04 LAB
ALBUMIN SERPL ELPH-MCNC: 3.7 G/DL — SIGNIFICANT CHANGE UP (ref 3.3–5.2)
ALP SERPL-CCNC: 48 U/L — SIGNIFICANT CHANGE UP (ref 40–120)
ALT FLD-CCNC: 14 U/L — SIGNIFICANT CHANGE UP
ANION GAP SERPL CALC-SCNC: 14 MMOL/L — SIGNIFICANT CHANGE UP (ref 5–17)
AST SERPL-CCNC: 19 U/L — SIGNIFICANT CHANGE UP
BASOPHILS # BLD AUTO: 0.04 K/UL — SIGNIFICANT CHANGE UP (ref 0–0.2)
BASOPHILS NFR BLD AUTO: 0.3 % — SIGNIFICANT CHANGE UP (ref 0–2)
BILIRUB DIRECT SERPL-MCNC: 0.1 MG/DL — SIGNIFICANT CHANGE UP (ref 0–0.3)
BILIRUB INDIRECT FLD-MCNC: 0.3 MG/DL — SIGNIFICANT CHANGE UP (ref 0.2–1)
BILIRUB SERPL-MCNC: 0.4 MG/DL — SIGNIFICANT CHANGE UP (ref 0.4–2)
BUN SERPL-MCNC: 17.1 MG/DL — SIGNIFICANT CHANGE UP (ref 8–20)
CALCIUM SERPL-MCNC: 8.1 MG/DL — LOW (ref 8.4–10.5)
CHLORIDE SERPL-SCNC: 102 MMOL/L — SIGNIFICANT CHANGE UP (ref 98–107)
CK SERPL-CCNC: 141 U/L — SIGNIFICANT CHANGE UP (ref 25–170)
CO2 SERPL-SCNC: 21 MMOL/L — LOW (ref 22–29)
CREAT SERPL-MCNC: 0.87 MG/DL — SIGNIFICANT CHANGE UP (ref 0.5–1.3)
EGFR: 68 ML/MIN/1.73M2 — SIGNIFICANT CHANGE UP
EOSINOPHIL # BLD AUTO: 0.02 K/UL — SIGNIFICANT CHANGE UP (ref 0–0.5)
EOSINOPHIL NFR BLD AUTO: 0.1 % — SIGNIFICANT CHANGE UP (ref 0–6)
GLUCOSE SERPL-MCNC: 115 MG/DL — HIGH (ref 70–99)
HCT VFR BLD CALC: 32.8 % — LOW (ref 34.5–45)
HGB BLD-MCNC: 10.7 G/DL — LOW (ref 11.5–15.5)
IMM GRANULOCYTES NFR BLD AUTO: 0.4 % — SIGNIFICANT CHANGE UP (ref 0–1.5)
INR BLD: 1.09 RATIO — SIGNIFICANT CHANGE UP (ref 0.88–1.16)
LACTATE SERPL-SCNC: 1.2 MMOL/L — SIGNIFICANT CHANGE UP (ref 0.5–2)
LYMPHOCYTES # BLD AUTO: 1.64 K/UL — SIGNIFICANT CHANGE UP (ref 1–3.3)
LYMPHOCYTES # BLD AUTO: 12.2 % — LOW (ref 13–44)
MAGNESIUM SERPL-MCNC: 2.4 MG/DL — SIGNIFICANT CHANGE UP (ref 1.8–2.6)
MCHC RBC-ENTMCNC: 30.7 PG — SIGNIFICANT CHANGE UP (ref 27–34)
MCHC RBC-ENTMCNC: 32.6 GM/DL — SIGNIFICANT CHANGE UP (ref 32–36)
MCV RBC AUTO: 94.3 FL — SIGNIFICANT CHANGE UP (ref 80–100)
MONOCYTES # BLD AUTO: 0.94 K/UL — HIGH (ref 0–0.9)
MONOCYTES NFR BLD AUTO: 7 % — SIGNIFICANT CHANGE UP (ref 2–14)
NEUTROPHILS # BLD AUTO: 10.7 K/UL — HIGH (ref 1.8–7.4)
NEUTROPHILS NFR BLD AUTO: 80 % — HIGH (ref 43–77)
PLATELET # BLD AUTO: 189 K/UL — SIGNIFICANT CHANGE UP (ref 150–400)
POTASSIUM SERPL-MCNC: 3.7 MMOL/L — SIGNIFICANT CHANGE UP (ref 3.5–5.3)
POTASSIUM SERPL-SCNC: 3.7 MMOL/L — SIGNIFICANT CHANGE UP (ref 3.5–5.3)
PROT SERPL-MCNC: 6 G/DL — LOW (ref 6.6–8.7)
PROTHROM AB SERPL-ACNC: 12.6 SEC — SIGNIFICANT CHANGE UP (ref 10.5–13.4)
RBC # BLD: 3.48 M/UL — LOW (ref 3.8–5.2)
RBC # FLD: 13.4 % — SIGNIFICANT CHANGE UP (ref 10.3–14.5)
SODIUM SERPL-SCNC: 136 MMOL/L — SIGNIFICANT CHANGE UP (ref 135–145)
TROPONIN T SERPL-MCNC: 0.02 NG/ML — SIGNIFICANT CHANGE UP (ref 0–0.06)
WBC # BLD: 13.39 K/UL — HIGH (ref 3.8–10.5)
WBC # FLD AUTO: 13.39 K/UL — HIGH (ref 3.8–10.5)

## 2022-08-04 PROCEDURE — 99233 SBSQ HOSP IP/OBS HIGH 50: CPT

## 2022-08-04 PROCEDURE — 93325 DOPPLER ECHO COLOR FLOW MAPG: CPT

## 2022-08-04 PROCEDURE — 84484 ASSAY OF TROPONIN QUANT: CPT

## 2022-08-04 PROCEDURE — 83605 ASSAY OF LACTIC ACID: CPT

## 2022-08-04 PROCEDURE — 85025 COMPLETE CBC W/AUTO DIFF WBC: CPT

## 2022-08-04 PROCEDURE — 80048 BASIC METABOLIC PNL TOTAL CA: CPT

## 2022-08-04 PROCEDURE — 82803 BLOOD GASES ANY COMBINATION: CPT

## 2022-08-04 PROCEDURE — 93010 ELECTROCARDIOGRAM REPORT: CPT

## 2022-08-04 PROCEDURE — C1769: CPT

## 2022-08-04 PROCEDURE — 83735 ASSAY OF MAGNESIUM: CPT

## 2022-08-04 PROCEDURE — 93306 TTE W/DOPPLER COMPLETE: CPT

## 2022-08-04 PROCEDURE — 71045 X-RAY EXAM CHEST 1 VIEW: CPT | Mod: 26

## 2022-08-04 PROCEDURE — 85014 HEMATOCRIT: CPT

## 2022-08-04 PROCEDURE — 84132 ASSAY OF SERUM POTASSIUM: CPT

## 2022-08-04 PROCEDURE — 82947 ASSAY GLUCOSE BLOOD QUANT: CPT

## 2022-08-04 PROCEDURE — 85018 HEMOGLOBIN: CPT

## 2022-08-04 PROCEDURE — C1889: CPT

## 2022-08-04 PROCEDURE — L8699: CPT

## 2022-08-04 PROCEDURE — 93312 ECHO TRANSESOPHAGEAL: CPT

## 2022-08-04 PROCEDURE — 36415 COLL VENOUS BLD VENIPUNCTURE: CPT

## 2022-08-04 PROCEDURE — 85610 PROTHROMBIN TIME: CPT

## 2022-08-04 PROCEDURE — 82550 ASSAY OF CK (CPK): CPT

## 2022-08-04 PROCEDURE — 71045 X-RAY EXAM CHEST 1 VIEW: CPT

## 2022-08-04 PROCEDURE — 93320 DOPPLER ECHO COMPLETE: CPT

## 2022-08-04 PROCEDURE — 76000 FLUOROSCOPY <1 HR PHYS/QHP: CPT

## 2022-08-04 PROCEDURE — 93005 ELECTROCARDIOGRAM TRACING: CPT

## 2022-08-04 PROCEDURE — 97163 PT EVAL HIGH COMPLEX 45 MIN: CPT

## 2022-08-04 PROCEDURE — 82435 ASSAY OF BLOOD CHLORIDE: CPT

## 2022-08-04 PROCEDURE — 80053 COMPREHEN METABOLIC PANEL: CPT

## 2022-08-04 PROCEDURE — 99231 SBSQ HOSP IP/OBS SF/LOW 25: CPT

## 2022-08-04 PROCEDURE — 82330 ASSAY OF CALCIUM: CPT

## 2022-08-04 PROCEDURE — 84295 ASSAY OF SERUM SODIUM: CPT

## 2022-08-04 PROCEDURE — 85730 THROMBOPLASTIN TIME PARTIAL: CPT

## 2022-08-04 PROCEDURE — 80076 HEPATIC FUNCTION PANEL: CPT

## 2022-08-04 RX ORDER — ACETAMINOPHEN 500 MG
2 TABLET ORAL
Qty: 0 | Refills: 0 | DISCHARGE

## 2022-08-04 RX ORDER — CETIRIZINE HYDROCHLORIDE 10 MG/1
1 TABLET ORAL
Qty: 0 | Refills: 0 | DISCHARGE

## 2022-08-04 RX ORDER — ASPIRIN/CALCIUM CARB/MAGNESIUM 324 MG
1 TABLET ORAL
Qty: 30 | Refills: 1
Start: 2022-08-04 | End: 2022-10-02

## 2022-08-04 RX ORDER — SERTRALINE 25 MG/1
1 TABLET, FILM COATED ORAL
Qty: 0 | Refills: 0 | DISCHARGE

## 2022-08-04 RX ORDER — ACETAMINOPHEN 500 MG
1000 TABLET ORAL ONCE
Refills: 0 | Status: COMPLETED | OUTPATIENT
Start: 2022-08-04 | End: 2022-08-04

## 2022-08-04 RX ORDER — POTASSIUM CHLORIDE 20 MEQ
40 PACKET (EA) ORAL ONCE
Refills: 0 | Status: COMPLETED | OUTPATIENT
Start: 2022-08-04 | End: 2022-08-04

## 2022-08-04 RX ORDER — CHOLECALCIFEROL (VITAMIN D3) 125 MCG
1 CAPSULE ORAL
Qty: 0 | Refills: 0 | DISCHARGE

## 2022-08-04 RX ORDER — UBIDECARENONE 100 MG
0 CAPSULE ORAL
Qty: 0 | Refills: 0 | DISCHARGE

## 2022-08-04 RX ORDER — ROSUVASTATIN CALCIUM 5 MG/1
1 TABLET ORAL
Qty: 0 | Refills: 0 | DISCHARGE

## 2022-08-04 RX ORDER — LEVOTHYROXINE SODIUM 125 MCG
1 TABLET ORAL
Qty: 0 | Refills: 0 | DISCHARGE

## 2022-08-04 RX ORDER — CLOPIDOGREL BISULFATE 75 MG/1
1 TABLET, FILM COATED ORAL
Qty: 30 | Refills: 1
Start: 2022-08-04 | End: 2022-10-02

## 2022-08-04 RX ADMIN — Medication 1000 MILLIGRAM(S): at 06:30

## 2022-08-04 RX ADMIN — Medication 400 MILLIGRAM(S): at 06:15

## 2022-08-04 RX ADMIN — CLOPIDOGREL BISULFATE 75 MILLIGRAM(S): 75 TABLET, FILM COATED ORAL at 11:53

## 2022-08-04 RX ADMIN — Medication 100 MILLIGRAM(S): at 09:00

## 2022-08-04 RX ADMIN — Medication 1000 MILLIGRAM(S): at 00:00

## 2022-08-04 RX ADMIN — Medication 75 MICROGRAM(S): at 06:02

## 2022-08-04 RX ADMIN — Medication 81 MILLIGRAM(S): at 11:53

## 2022-08-04 RX ADMIN — PANTOPRAZOLE SODIUM 40 MILLIGRAM(S): 20 TABLET, DELAYED RELEASE ORAL at 11:53

## 2022-08-04 RX ADMIN — Medication 40 MILLIEQUIVALENT(S): at 06:01

## 2022-08-04 RX ADMIN — SERTRALINE 50 MILLIGRAM(S): 25 TABLET, FILM COATED ORAL at 11:54

## 2022-08-04 NOTE — DISCHARGE NOTE PROVIDER - NSDCCPTREATMENT_GEN_ALL_CORE_FT
PRINCIPAL PROCEDURE  Procedure: TAVR, open femoral artery approach  Findings and Treatment: Transfemoral TAVR via Right Common Femoral Artery Cutdown (26mm Maggie 3 Ultra) (NCT# [id number]) (STS/ACC TVT Registry Patient ID# [id number])

## 2022-08-04 NOTE — DISCHARGE NOTE PROVIDER - CARE PROVIDER_API CALL
Rafa Meadows)  Surgery; Thoracic and Cardiac Surgery  [address] [place] [address]  Phone: [phone]  Fax: [phone]  Follow Up Time:     Rachele Ennis)  Cardiovascular Disease; Critical Care Medicine; Internal Medicine; Interventional Cardiology  [address] [place] [address]  Phone: [phone]  Fax: [phone]  Follow Up Time:

## 2022-08-04 NOTE — PROGRESS NOTE ADULT - PROBLEM SELECTOR PLAN 1
Now POD 1 S/P TAVR  F/U official post op echo read  Continue ASA plavix  OOB, chest PT, Physical therapy  Incentive spiromety   Possible discharge home today with MCOT.  Plan to be discussed with Dr. Meadows on morning rounds

## 2022-08-04 NOTE — DISCHARGE NOTE PROVIDER - NSDCCPCAREPLAN_GEN_ALL_CORE_FT
PRINCIPAL DISCHARGE DIAGNOSIS  Diagnosis: Aortic stenosis  Assessment and Plan of Treatment: - Keep the groin site clean and dry.  You may shower and pat the site dry.  - Watch the site for signs of redness or drainage, these should be reported to your doctor immediately.  - You will receive a wallet card about your new valve in the mail.  Please carry it with you to present to anyone who may ask if you have any medical implants.  - Be sure to inform your doctors including your dentist about your valve since you will need to take antibiotics to reduce the risk of infection before certain medical and dental procedures.  - You will be given an appointment to follow up with your doctor in approximately 4 weeks.  It is important to keep this appointment so that your new valve can be assessed.  - You may resume all your normal activities.      SECONDARY DISCHARGE DIAGNOSES  Diagnosis: Chronic diastolic heart failure  Assessment and Plan of Treatment: Continue medications as directed     PRINCIPAL DISCHARGE DIAGNOSIS  Diagnosis: Aortic stenosis  Assessment and Plan of Treatment: Transcatheter Aortic Valce Replacement (8/3/22)  - Keep the surgical sites clean and dry.  You may shower and pat the site dry.  - Monitor the surgical sites for signs of redness or drainage, these should be reported to your doctor immediately.  - You will receive a wallet card about your new valve in the mail.  Please carry it with you to present to anyone who may ask if you have any medical implants.  - Be sure to inform your doctors including your dentist about your valve since you will need to take antibiotics to reduce the risk of infection before certain medical and dental procedures.  - You will be given an appointment to follow up with your doctor in approximately 2 weeks.  It is important to keep this appointment so that your new valve can be assessed.  - You may resume all your normal activities.  You are being discharged with a TriOvizOT heart rate monitor. This device will monitor your heart rate for 28 days after your TAVR surgery. You may shower with the device in place but do not submerge in water. You must follow the directions in the box to change the dressing every week. Carry the phone provided with you at all times. The phone transmits the signat from the monitor to your cardiologist. Charge the phone every night and keep it near you when doing so. Charge the monitor each week when you change the dressing. At the end of 28 days, please place the sensor and phone in the box and place in mailing envelope. They can then be placed in your mailbox to return to the company. If there is any issue with your heart rate, you will recieved a phone call from the Cardiologist with further instructions. You can call the CT Surgery office with any questions.        SECONDARY DISCHARGE DIAGNOSES  Diagnosis: Chronic diastolic heart failure  Assessment and Plan of Treatment: Continue medications as directed

## 2022-08-04 NOTE — DISCHARGE NOTE NURSING/CASE MANAGEMENT/SOCIAL WORK - PATIENT PORTAL LINK FT
You can access the FollowMyHealth Patient Portal offered by [facility] by registering at the following website: [email or web address]. By joining [employer]’s FollowMyHealth portal, you will also be able to view your health information using other applications (apps) compatible with our system.

## 2022-08-04 NOTE — DISCHARGE NOTE PROVIDER - NSDCFUSCHEDAPPT_GEN_ALL_CORE_FT
[facility]  BRSTIMAG  [address]  Scheduled Appointment: 08/31/2022    [facility]  DIAGRAD  [address]  Scheduled Appointment: 08/31/2022    Bobo Salas  [facility]  Derm [address]  Scheduled Appointment: 09/16/2022     Rafa Meadows  [facility]  CTSURG [address]  Scheduled Appointment: 08/09/2022    [facility]  BRSTIMAG  [address]  Scheduled Appointment: 08/31/2022    [facility]  DIAGRAD  [address]  Scheduled Appointment: 08/31/2022    Bobo Salas  [facility]  Derm [address]  Scheduled Appointment: 09/16/2022

## 2022-08-04 NOTE — DISCHARGE NOTE PROVIDER - NSDCFUADDAPPT_GEN_ALL_CORE_FT
You have an appointment scheduled with Dr. Meadows on August 9th at 2:45PM.   The cardiac surgery office is located on the first floor of [facility] at [address] [place] [address]. Please enter through the lobby. A [facility] employee will then direct you where to go.    Please follow up with your cardiologist and primary care provider within 2-4 weeks of discharge.     Your Care Navigator Nurse Practitioner will be in touch to see you in your home within a few days from discharge. The Follow Your Heart program can help ensure you understand your medications, discharge instructions and answer any questions you may have at that time. They are also a great source to address concerns during the day and may be reached at [phone].

## 2022-08-04 NOTE — DISCHARGE NOTE NURSING/CASE MANAGEMENT/SOCIAL WORK - NSDCFUADDAPPT_GEN_ALL_CORE_FT
You have an appointment scheduled with Dr. Meadows on August 9th at 2:45PM.   The cardiac surgery office is located on the first floor of [facility] at [address] [place] [address]. Please enter through the lobby. A [facility] employee will then direct you where to go.    Please follow up with your cardiologist and primary care provider within 2-4 weeks of discharge.     Your Care Navigator Nurse Practitioner will be in touch to see you in your home within a few days from discharge. The Follow Your Heart program can help ensure you understand your medications, discharge instructions and answer any questions you may have at that time. They are also a great source to address concerns during the day and may be reached at [phone].

## 2022-08-04 NOTE — DISCHARGE NOTE PROVIDER - NSDCMRMEDTOKEN_GEN_ALL_CORE_FT
clobetasol 0.05% topical cream: Apply topically to affected area 2 times a day, As Needed  Coenzyme Q10 200 mg oral capsule:   levothyroxine 75 mcg (0.075 mg) oral tablet: 1 tab(s) orally once a day  rosuvastatin 5 mg oral tablet: 1 tab(s) orally once a day  sertraline 50 mg oral tablet: 1 tab(s) orally once a day  Tylenol 325 mg oral tablet: 2 tab(s) orally every 4 hours, As Needed  Vitamin D3 125 mcg (5000 intl units) oral tablet: 1 tab(s) orally once a day  ZyrTEC 10 mg oral tablet: 1 tab(s) orally once a day, As Needed   aspirin 81 mg oral delayed release tablet: 1 tab(s) orally once a day  clobetasol 0.05% topical cream: Apply topically to affected area 2 times a day, As Needed  clopidogrel 75 mg oral tablet: 1 tab(s) orally once a day  Coenzyme Q10 200 mg oral capsule:   levothyroxine 75 mcg (0.075 mg) oral tablet: 1 tab(s) orally once a day  rosuvastatin 5 mg oral tablet: 1 tab(s) orally once a day  sertraline 50 mg oral tablet: 1 tab(s) orally once a day  Tylenol 325 mg oral tablet: 2 tab(s) orally every 4 hours, As Needed  Vitamin D3 125 mcg (5000 intl units) oral tablet: 1 tab(s) orally once a day  ZyrTEC 10 mg oral tablet: 1 tab(s) orally once a day, As Needed

## 2022-08-04 NOTE — DISCHARGE NOTE PROVIDER - HOSPITAL COURSE
77 yr old female presents with a known  history of aortic stenosis , anxiety , thyroid nodules and  high cholesterol.  pt states she had an echo about 6 yrs ago and she was made aware. Follows with DR. Ennis for cardiology. Pt c/o increased dyspnea since fall 2021, shortness of breath with stairs and walking uphill, stress test done Nov 2021and pt states she could not complete. Cardiac cath done 11/2/21 at [facility]   pt states no blockage but stenosis was worse.  Echo 7/6/22 showed severe aortic stenosis ,EF 60 %  ct scan done 7/7/22 andr diverticulitis in descending colon noted , pt followed with GI DR. Chang and was treated with antibiotics, pt was in [facility] in Feb 2022 for diverticulitis , denies any current abdominal pain.  SHe was scheduled for TAVR. Pt underwent TAVR on 8/3/22 with Dr. Meadows with transient Left bundle branch block post deployment. No acute events overnight. Stable for discharge home with MCOT monitor.    T(C): 37.6 (08-04-22 @ 00:00)  T(F): 99.7 (08-04-22 @ 00:00)  HR: 96 (08-04-22 @ 00:00)  BP: 108/53 (08-04-22 @ 00:00)  BP(mean): 76 (08-04-22 @ 00:00)  ABP: 104/40 (08-04-22 @ 00:00)  ABP(mean): 59 (08-04-22 @ 00:00)  RR: 13 (08-04-22 @ 00:00)  SpO2: 95% (08-04-22 @ 00:00)        Physical Exam:  Gen: A&Ox3  Pulm:  decreased at bases  CV:  S1S2,   Abd: +BS, soft, NT, ND  Ext:  +DP b/l, no c/c/e  Incision:  c/d/i Groins soft       < from: TTE Echo Complete w/o Contrast w/ Doppler (08.03.22 @ 14:53) >    Summary:   1. Left ventricular ejection fraction, by visual estimation, is 65 to   70%.   2. Normal global left ventricular systolic function.   3. Spectral Doppler shows impaired relaxation pattern of left   ventricular myocardial filling (Grade I diastolic dysfunction).   4. Normal left atrial size.   5. Normal right atrial size.   6. Mild thickening of the anterior and posterior mitral valve leaflets.   7. Trace mitral valve regurgitation.  8. Bella Maggie 3 Ultra Transfemoral TAVR in the aortic position.   Gradients suggestive of normally functioning prosthetic valve. No aortic   regurgitation visualized.   9. Compared to prior TTE dated 7/2022, a TAVR is now present in aortic   position.    < end of copied text >         77 yr old female presents with a known  history of aortic stenosis , anxiety , thyroid nodules and  high cholesterol.  pt states she had an echo about 6 yrs ago and she was made aware. Follows with DR. Ennis for cardiology. Pt c/o increased dyspnea since fall 2021, shortness of breath with stairs and walking uphill, stress test done Nov 2021and pt states she could not complete. Cardiac cath done 11/2/21 at [facility] pt states no blockage but stenosis was worse.  Echo 7/6/22 showed severe aortic stenosis ,EF 60 %  ct scan done 7/7/22 andr diverticulitis in descending colon noted , pt followed with GI DR. Chang and was treated with antibiotics, pt was in [facility] in Feb 2022 for diverticulitis, denies any current abdominal pain.  SHe was scheduled for TAVR. Pt underwent TAVR on 8/3/22 with Dr. Meadows with transient Left bundle branch block post deployment, and has since been in [facility] with no further issues. No acute events overnight. Patient will be discharged with a [employer] monitor to evaluate for potential conduction or rhythm disturbances post TAVR. Pt is hemodynamically stable and is cleared for discharge per Dr. Meadows.       T(C): 37.6 (08-04-22 @ 00:00)  T(F): 99.7 (08-04-22 @ 00:00)  HR: 96 (08-04-22 @ 00:00)  BP: 108/53 (08-04-22 @ 00:00)  BP(mean): 76 (08-04-22 @ 00:00)  ABP: 104/40 (08-04-22 @ 00:00)  ABP(mean): 59 (08-04-22 @ 00:00)  RR: 13 (08-04-22 @ 00:00)  SpO2: 95% (08-04-22 @ 00:00)        Physical Exam:  Gen: A&Ox3  Pulm:  decreased at bases  CV:  S1S2,   Abd: +BS, soft, NT, ND  Ext:  +DP b/l, no c/c/e  Incision:  c/d/i Groins soft       < from: TTE Echo Complete w/o Contrast w/ Doppler (08.03.22 @ 14:53) >    Summary:   1. Left ventricular ejection fraction, by visual estimation, is 65 to   70%.   2. Normal global left ventricular systolic function.   3. Spectral Doppler shows impaired relaxation pattern of left   ventricular myocardial filling (Grade I diastolic dysfunction).   4. Normal left atrial size.   5. Normal right atrial size.   6. Mild thickening of the anterior and posterior mitral valve leaflets.   7. Trace mitral valve regurgitation.  8. Bella Maggie 3 Ultra Transfemoral TAVR in the aortic position.   Gradients suggestive of normally functioning prosthetic valve. No aortic   regurgitation visualized.   9. Compared to prior TTE dated 7/2022, a TAVR is now present in aortic   position.    < end of copied text >

## 2022-08-04 NOTE — PROGRESS NOTE ADULT - ASSESSMENT
77 yr old female presents with a known  history of aortic stenosis , anxiety , thyroid nodules and  high cholesterol.  pt states she had an echo about 6 yrs ago and she was made aware. Follows with DR. Olson for cardiology. Pt c/o increased dyspnea since fall 2021, shortness of breath with stairs and walking uphill, stress test done Nov 2021and pt states she could not complete. Cardiac cath done 11/2/21 at [facility]   pt states no blockage but stenosis was worse.  Echo 7/6/22 showed severe aortic stenosis ,EF 60 %  ct scan done 7/7/22 andr diverticulitis in descending colon noted , pt followed with GI DR. Chang and was treated with antibiotics, pt was in [facility] in Feb 2022 for diverticulitis , denies any current abdominal pain. Pt underwent TAVR on 8/3/22 with Dr. Meadows. No acute events overnight.

## 2022-08-04 NOTE — PATIENT PROFILE ADULT - FALL HARM RISK - HARM RISK INTERVENTIONS

## 2022-08-04 NOTE — DISCHARGE NOTE PROVIDER - NSDCFUADDINST_GEN_ALL_CORE_FT
Please call the Cardiothoracic Surgery office at [phone] if you are experiencing any shortness of breath, chest pain, fevers or chills, drainage from the incisions, persistent nausea, vomiting or if you have any questions about your medications. If the symptoms are severe, call 911 and go to the nearest hospital. You can also call [phone] for an emergency [facility] ambulance, which will take you to the closest [facility].    If you need any assistance for making any appointments for a new consult or referral in any specialty, please call our [facility] Clinical Coordination Center at [phone].

## 2022-08-04 NOTE — PROGRESS NOTE ADULT - SUBJECTIVE AND OBJECTIVE BOX
FATUMA BRISCOE  [id number]    HPI:  77 yr old female presents with a known  history of aortic stenosis , anxiety , thyroid nodules and  high cholesterol.  pt states she had an echo about 6 yrs ago and she was made aware. Follows with DR. Olson for cardiology. Pt c/o increased dyspnea since fall , shortness of breath with stairs and walking uphill, stress test done  pt states she could not complete. Cardiac cath done 21 at [facility]   pt states no blockage but stenosis was worse.  Echo 22 showed severe aortic stenosis ,EF 60 %  ct scan done 22 andr diverticulitis in descending colon noted , pt followed with GI DR. Chang and was treated with antibiotics, pt was in [facility] in 2022 for diverticulitis , denies any current abdominal pain. Pt is scheduled for  TAVR on 8/3/22 with Dr. Garcia.  (2022 11:31)      Surgery/Hospital Course:  ·  PRE-OP DIAGNOSIS:  Severe aortic stenosis  Chronic diastolic CHF (congestive heart failure), NYHA class 2   ·  POST-OP DIAGNOSIS:  Severe aortic stenosis   Chronic diastolic CHF (congestive heart failure), NYHA class 2   Left bundle branch block   ·  PROCEDURES:  TAVR, open femoral artery approach 03-Aug-2022   Transfemoral TAVR via Right Common Femoral Artery Cutdown (26mm Maggie 3 Ultra)  Today:  No acute events     ICU Vital Signs Last 24 Hrs  T(C): 36.7 (03 Aug 2022 06:06), Max: 36.7 (03 Aug 2022 06:06)  T(F): 98 (03 Aug 2022 06:06), Max: 98 (03 Aug 2022 06:06)  HR: 88 (03 Aug 2022 09:30) (88 - 94)  BP: 162/80 (03 Aug 2022 06:06) (162/80 - 162/80)  BP(mean): --  ABP: 153/49 (03 Aug 2022 09:30) (153/49 - 162/46)  ABP(mean): 78 (03 Aug 2022 09:30) (78 - 78)  RR: 14 (03 Aug 2022 09:30) (13 - 15)  SpO2: 96% (03 Aug 2022 09:30) (96% - 97%)    O2 Parameters below as of 03 Aug 2022 09:15  Patient On (Oxygen Delivery Method): nasal cannula  O2 Flow (L/min): 4          Physical Exam:  Gen:  Awake, alert   CNS: non focal 	  Neck: no JVD  RES : clear , no wheezing              CVS: Regular  rhythm. Normal S1/S2  Abd: Soft, non-distended. Bowel sounds present.  Skin: No rash.  Ext:  no edema    ============================I/O===========================   I&O's Detail    ============================ LABS =========================                        10.8   7.82  )-----------( 155      ( 03 Aug 2022 09:00 )             32.9               ABG - ( 03 Aug 2022 09:23 )  pH, Arterial: 7.440 pH, Blood: x     /  pCO2: 36    /  pO2: 92    / HCO3: 24    / Base Excess: 0.3   /  SaO2: 98.9                ======================Micro/Rad/Cardio=================  Culture: Reviewed   CXR: Reviewed  Echo:Reviewed  ======================================================  PAST MEDICAL & SURGICAL HISTORY:  Aortic stenosis      HLD (hyperlipidemia)      S/P removal of thyroid nodule      Anxiety      Diverticulitis      History of cardiac cath      S/P  section      S/P thyroid surgery  partial removal nodule      History of arthroscopy  left knee      H/O: hysterectomy      Cataract        ====================ASSESSMENT ==============  SP Commercial 26mm Bella Maggie 3 Ultra Transfemoral TAVR via Right Common Femoral Artery Cutdown.  Post op Hypovolemia  Post op respiratory insufficiency       Plan:  ====================== NEUROLOGY=====================    ==================== RESPIRATORY======================  Post op respiratory insufficiency    ====================CARDIOVASCULAR==================  Post op Hypovolemia  norepinephrine Infusion 0.05 MICROgram(s)/kG/Min (7.59 mL/Hr) IV Continuous <Continuous>    ===================HEMATOLOGIC/ONC ===================  Monitor H&H/Plts    aspirin enteric coated 81 milliGRAM(s) Oral once  clopidogrel Tablet 75 milliGRAM(s) Oral once    ===================== RENAL =========================  Continue monitoring urine output, I&OS, BUN/Cr     ==================== GASTROINTESTINAL===================  pantoprazole  Injectable 40 milliGRAM(s) IV Push once  potassium chloride  10 mEq/50 mL IVPB 10 milliEquivalent(s) IV Intermittent every 1 hour  potassium chloride  10 mEq/50 mL IVPB 10 milliEquivalent(s) IV Intermittent every 1 hour  potassium chloride  10 mEq/50 mL IVPB 10 milliEquivalent(s) IV Intermittent every 1 hour  sodium chloride 0.9%. 1000 milliLiter(s) (10 mL/Hr) IV Continuous <Continuous>  sodium chloride 0.9%. 1000 milliLiter(s) (5 mL/Hr) IV Continuous <Continuous>    =======================    ENDOCRINE  =====================  atorvastatin 20 milliGRAM(s) Oral at bedtime    ========================INFECTIOUS DISEASE================  cefuroxime  IVPB 1500 milliGRAM(s) IV Intermittent every 8 hours  vancomycin  IVPB 1250 milliGRAM(s) IV Intermittent once          I have spent 35 minutes providing acute care for this critically ill patient     Patient requires continuous monitoring with bedside rhythm monitoring, pulse ox monitoring, and intermittent blood gas analysis. Care plan discussed with ICU care team. Patient remained critical and at risk for life threatening decompensation.           
Patient seen and examined.  Denies CP, SOB, N/V.  She was feeling a bit anxious earlier this evening which improved with Xanax.  No acute events over night    T(C): 37.6 (08-04-22 @ 00:00)  T(F): 99.7 (08-04-22 @ 00:00)  HR: 96 (08-04-22 @ 00:00)  BP: 108/53 (08-04-22 @ 00:00)  BP(mean): 76 (08-04-22 @ 00:00)  ABP: 104/40 (08-04-22 @ 00:00)  ABP(mean): 59 (08-04-22 @ 00:00)  RR: 13 (08-04-22 @ 00:00)  SpO2: 95% (08-04-22 @ 00:00)        Physical Exam:  Gen: A&Ox3  Pulm:  decreased at bases  CV:  S1S2,   Abd: +BS, soft, NT, ND  Ext:  +DP b/l, no c/c/e  Incision:  c/d/i Groins soft     I&O's Detail    03 Aug 2022 07:01  -  04 Aug 2022 00:58  --------------------------------------------------------  IN:    IV PiggyBack: 350 mL    Oral Fluid: 250 mL  Total IN: 600 mL    OUT:    Indwelling Catheter - Urethral (mL): 2325 mL  Total OUT: 2325 mL    Total NET: -1725 mL                              10.8   7.82  )-----------( 155      ( 03 Aug 2022 09:00 )             32.9   08-03    139  |  105  |  14.7  ----------------------------<  153<H>  3.7   |  22.0  |  0.53    Ca    8.8      03 Aug 2022 09:00  Mg     1.6     08-03    TPro  6.0<L>  /  Alb  3.5  /  TBili  0.4  /  DBili  x   /  AST  17  /  ALT  12  /  AlkPhos  50  08-03  aPTT: 28.2 sec; PT: 13.7 sec; INR: 1.18 ratio  08-03-22 @ 09:52       ABG - ( 03 Aug 2022 09:23 )  pH: 7.440 /  pCO2: 36    /  pO2: 92    / HCO3: 24    / Base Excess: 0.3   /  SaO2: 98.9 /  Lactate: x        CAPILLARY BLOOD GLUCOSE            Medications:  aspirin enteric coated 81 milliGRAM(s) Oral daily  atorvastatin 20 milliGRAM(s) Oral at bedtime  cefuroxime  IVPB 1500 milliGRAM(s) IV Intermittent every 8 hours  chlorhexidine 0.12% Liquid 5 milliLiter(s) Oral Mucosa two times a day  clopidogrel Tablet 75 milliGRAM(s) Oral daily  levothyroxine 75 MICROGram(s) Oral daily  loratadine 10 milliGRAM(s) Oral once  norepinephrine Infusion 0.05 MICROgram(s)/kG/Min IV Continuous <Continuous>  pantoprazole    Tablet 40 milliGRAM(s) Oral daily  potassium chloride  10 mEq/50 mL IVPB 10 milliEquivalent(s) IV Intermittent every 1 hour  potassium chloride  10 mEq/50 mL IVPB 10 milliEquivalent(s) IV Intermittent every 1 hour  potassium chloride  10 mEq/50 mL IVPB 10 milliEquivalent(s) IV Intermittent every 1 hour  sertraline 50 milliGRAM(s) Oral daily  sodium chloride 0.9%. 1000 milliLiter(s) IV Continuous <Continuous>  sodium chloride 0.9%. 1000 milliLiter(s) IV Continuous <Continuous>      
FATUMA BRISCOE  [id number]    HPI:  77 yr old female presents with a known  history of aortic stenosis , anxiety , thyroid nodules and  high cholesterol.  pt states she had an echo about 6 yrs ago and she was made aware. Follows with DR. Olson for cardiology. Pt c/o increased dyspnea since fall , shortness of breath with stairs and walking uphill, stress test done  pt states she could not complete. Cardiac cath done 21 at [facility]   pt states no blockage but stenosis was worse.  Echo 22 showed severe aortic stenosis ,EF 60 %  ct scan done 22 andr diverticulitis in descending colon noted , pt followed with GI DR. Chang and was treated with antibiotics, pt was in [facility] in 2022 for diverticulitis , denies any current abdominal pain. Pt is scheduled for  TAVR on 8/3/22 with Dr. Garcia.  (2022 11:31)    Surgery/Hospital Course:  ·  PRE-OP DIAGNOSIS:  Severe aortic stenosis  Chronic diastolic CHF (congestive heart failure), NYHA class 2   ·  POST-OP DIAGNOSIS:  Severe aortic stenosis   Chronic diastolic CHF (congestive heart failure), NYHA class 2   Left bundle branch block   ·  PROCEDURES:  TAVR, open femoral artery approach 03-Aug-2022   Transfemoral TAVR via Right Common Femoral Artery Cutdown (26mm Maggie 3 Ultra)  Today:  home today  No acute events     ICU Vital Signs Last 24 Hrs  T(C): 37.4 (04 Aug 2022 07:00), Max: 37.6 (04 Aug 2022 00:00)  T(F): 99.3 (04 Aug 2022 07:00), Max: 99.7 (04 Aug 2022 00:00)  HR: 85 (04 Aug 2022 11:00) (80 - 103)  BP: 113/61 (04 Aug 2022 11:00) (98/53 - 160/71)  BP(mean): 82 (04 Aug 2022 11:00) (73 - 102)  ABP: 290/289 (04 Aug 2022 06:00) (104/40 - 290/289)  ABP(mean): 138 (04 Aug 2022 06:00) (59 - 138)  RR: 22 (04 Aug 2022 11:00) (10 - 29)  SpO2: 95% (04 Aug 2022 11:00) (93% - 98%)    O2 Parameters below as of 04 Aug 2022 11:00  Patient On (Oxygen Delivery Method): room air            Physical Exam:  Gen:  Awake, alert   CNS: non focal 	  Neck: no JVD  RES : clear , no wheezing              CVS: Regular  rhythm. Normal S1/S2  Abd: Soft, non-distended. Bowel sounds present.  Skin: No rash.  Ext:  no edema    ============================I/O===========================   I&O's Detail    03 Aug 2022 07:01  -  04 Aug 2022 07:00  --------------------------------------------------------  IN:    IV PiggyBack: 350 mL    Oral Fluid: 250 mL  Total IN: 600 mL    OUT:    Indwelling Catheter - Urethral (mL): 2325 mL    Voided (mL): 250 mL  Total OUT: 2575 mL    Total NET: -1975 mL      04 Aug 2022 07:01  -  04 Aug 2022 11:59  --------------------------------------------------------  IN:    IV PiggyBack: 50 mL    Oral Fluid: 240 mL  Total IN: 290 mL    OUT:  Total OUT: 0 mL    Total NET: 290 mL        ============================ LABS =========================                        10.7   13.39 )-----------( 189      ( 04 Aug 2022 02:10 )             32.8     08-04    136  |  102  |  17.1  ----------------------------<  115<H>  3.7   |  21.0<L>  |  0.87    Ca    8.1<L>      04 Aug 2022 02:10  Mg     2.4     08-    TPro  6.0<L>  /  Alb  3.7  /  TBili  0.4  /  DBili  0.1  /  AST  19  /  ALT  14  /  AlkPhos  48  08-04    LIVER FUNCTIONS - ( 04 Aug 2022 02:10 )  Alb: 3.7 g/dL / Pro: 6.0 g/dL / ALK PHOS: 48 U/L / ALT: 14 U/L / AST: 19 U/L / GGT: x           PT/INR - ( 04 Aug 2022 02:10 )   PT: 12.6 sec;   INR: 1.09 ratio         PTT - ( 03 Aug 2022 09:52 )  PTT:28.2 sec  ABG - ( 03 Aug 2022 09:23 )  pH, Arterial: 7.440 pH, Blood: x     /  pCO2: 36    /  pO2: 92    / HCO3: 24    / Base Excess: 0.3   /  SaO2: 98.9                ======================Micro/Rad/Cardio=================  Culture: Reviewed   CXR: Reviewed  Echo:Reviewed  ======================================================  PAST MEDICAL & SURGICAL HISTORY:  Aortic stenosis      HLD (hyperlipidemia)      S/P removal of thyroid nodule      Anxiety      Diverticulitis      History of cardiac cath      S/P  section      S/P thyroid surgery  partial removal nodule      History of arthroscopy  left knee      H/O: hysterectomy      Cataract        ====================ASSESSMENT ==============  77 yr old female presents with a known  history of aortic stenosis , anxiety , thyroid nodules and  high cholesterol.  pt states she had an echo about 6 yrs ago and she was made aware. Follows with DR. Olson for cardiology. Pt c/o increased dyspnea since fall , shortness of breath with stairs and walking uphill, stress test done  pt states she could not complete. Cardiac cath done 21 at [facility]   pt states no blockage but stenosis was worse.  Echo 22 showed severe aortic stenosis ,EF 60 %  ct scan done 22 andr diverticulitis in descending colon noted , pt followed with GI DR. Chang and was treated with antibiotics, pt was in [facility] in 2022 for diverticulitis , denies any current abdominal pain. Pt underwent TAVR on 8/3/22 with Dr. Garcia. No acute events overnight.   ---Aortic stenosis.   ---Now POD 1 S/P TAVR  ---Hypothyroid.   ---Post op Hypovolemia  ---Post op respiratory insufficiency       Plan:  -Continue ASA plavix  -Possible discharge home today with [facility].  -CD'd for DVT prophylaxis  -Protonix for GI.  -Continue synthroid.    ====================== NEUROLOGY=====================  sertraline 50 milliGRAM(s) Oral daily    ==================== RESPIRATORY======================  Post op respiratory insufficiency  RA now  loratadine 10 milliGRAM(s) Oral once    ====================CARDIOVASCULAR==================  Post op Hypovolemia    ===================HEMATOLOGIC/ONC ===================  Monitor H&H/Plts    aspirin enteric coated 81 milliGRAM(s) Oral daily  clopidogrel Tablet 75 milliGRAM(s) Oral daily    ===================== RENAL =========================  Continue monitoring urine output, I&OS, BUN/Cr     ==================== GASTROINTESTINAL===================  pantoprazole    Tablet 40 milliGRAM(s) Oral daily  potassium chloride  10 mEq/50 mL IVPB 10 milliEquivalent(s) IV Intermittent every 1 hour  potassium chloride  10 mEq/50 mL IVPB 10 milliEquivalent(s) IV Intermittent every 1 hour  potassium chloride  10 mEq/50 mL IVPB 10 milliEquivalent(s) IV Intermittent every 1 hour  sodium chloride 0.9%. 1000 milliLiter(s) (10 mL/Hr) IV Continuous <Continuous>  sodium chloride 0.9%. 1000 milliLiter(s) (5 mL/Hr) IV Continuous <Continuous>    =======================    ENDOCRINE  =====================  atorvastatin 20 milliGRAM(s) Oral at bedtime  levothyroxine 75 MICROGram(s) Oral daily    ========================INFECTIOUS DISEASE================  cefuroxime  IVPB 1500 milliGRAM(s) IV Intermittent every 8 hours          I have spent 35 minutes providing acute care for this critically ill patient     Patient requires continuous monitoring with bedside rhythm monitoring, pulse ox monitoring, and intermittent blood gas analysis. Care plan discussed with ICU care team. Patient remained critical and at risk for life threatening decompensation.

## 2022-08-05 ENCOUNTER — NON-APPOINTMENT (OUTPATIENT)
Age: 77
End: 2022-08-05

## 2022-08-06 ENCOUNTER — TRANSCRIPTION ENCOUNTER (OUTPATIENT)
Age: 77
End: 2022-08-06

## 2022-08-06 ENCOUNTER — EMERGENCY (EMERGENCY)
Facility: HOSPITAL | Age: 77
LOS: 0 days | Discharge: HOME | End: 2022-08-06
Attending: EMERGENCY MEDICINE
Payer: MEDICARE

## 2022-08-06 VITALS
RESPIRATION RATE: 18 BRPM | SYSTOLIC BLOOD PRESSURE: 157 MMHG | HEART RATE: 99 BPM | DIASTOLIC BLOOD PRESSURE: 83 MMHG | OXYGEN SATURATION: 99 % | TEMPERATURE: 98 F

## 2022-08-06 VITALS — HEIGHT: 64 IN | WEIGHT: 175.93 LBS

## 2022-08-06 DIAGNOSIS — Z90.710 ACQUIRED ABSENCE OF BOTH CERVIX AND UTERUS: ICD-10-CM

## 2022-08-06 DIAGNOSIS — Z87.19 PERSONAL HISTORY OF OTHER DISEASES OF THE DIGESTIVE SYSTEM: ICD-10-CM

## 2022-08-06 DIAGNOSIS — Z98.890 OTHER SPECIFIED POSTPROCEDURAL STATES: Chronic | ICD-10-CM

## 2022-08-06 DIAGNOSIS — H26.9 UNSPECIFIED CATARACT: Chronic | ICD-10-CM

## 2022-08-06 DIAGNOSIS — Z90.710 ACQUIRED ABSENCE OF BOTH CERVIX AND UTERUS: Chronic | ICD-10-CM

## 2022-08-06 DIAGNOSIS — K92.1 MELENA: ICD-10-CM

## 2022-08-06 DIAGNOSIS — Z88.0 ALLERGY STATUS TO PENICILLIN: ICD-10-CM

## 2022-08-06 DIAGNOSIS — K59.00 CONSTIPATION, UNSPECIFIED: ICD-10-CM

## 2022-08-06 DIAGNOSIS — Z91.048 OTHER NONMEDICINAL SUBSTANCE ALLERGY STATUS: ICD-10-CM

## 2022-08-06 DIAGNOSIS — Z98.891 HISTORY OF UTERINE SCAR FROM PREVIOUS SURGERY: Chronic | ICD-10-CM

## 2022-08-06 DIAGNOSIS — F41.9 ANXIETY DISORDER, UNSPECIFIED: ICD-10-CM

## 2022-08-06 DIAGNOSIS — Q25.1 COARCTATION OF AORTA: ICD-10-CM

## 2022-08-06 DIAGNOSIS — E78.5 HYPERLIPIDEMIA, UNSPECIFIED: ICD-10-CM

## 2022-08-06 DIAGNOSIS — Z79.82 LONG TERM (CURRENT) USE OF ASPIRIN: ICD-10-CM

## 2022-08-06 PROCEDURE — 99282 EMERGENCY DEPT VISIT SF MDM: CPT

## 2022-08-06 PROCEDURE — 99283 EMERGENCY DEPT VISIT LOW MDM: CPT | Mod: FS

## 2022-08-06 NOTE — ED ADULT TRIAGE NOTE - CHIEF COMPLAINT QUOTE
Pt presents to ER c/o bloody stools. Onset of symptoms began today this afternoon. Pt hs not had a bm in 5 days. Pt reports blood is bright red. On Plavix

## 2022-08-06 NOTE — ED STATDOCS - PROGRESS NOTE DETAILS
Latha Holder for attending Dr. cadena: 76 y/o female with a PMhx of diverticulitis, anxiety, thyroid nodule, HLD, aortic stenosis presents to the ED c/o bloody stools. States she had a TAVR on 08/01 and has been having blood stools since. Will send pt to main ED for further evaluation. Pt. had non bloody BM while in intake.  Pt. only evaluated by MD. Stefanie Hoffman PA-C

## 2022-08-06 NOTE — ED STATDOCS - CLINICAL SUMMARY MEDICAL DECISION MAKING FREE TEXT BOX
Will send pt to main ED for further evaluation. Pt with bloody stools s/p TAVR, had BM in ED and notes symptoms have resolved, will d/c paddy and have pt f/o with internist or GI

## 2022-08-06 NOTE — ED STATDOCS - ATTENDING APP SHARED VISIT CONTRIBUTION OF CARE
I, Rafaela Vincent MD, performed the initial face to face bedside interview with this patient regarding history of present illness, review of symptoms and relevant past medical, social and family history.  I completed an independent physical examination.  I was the initial provider who evaluated this patient. I have signed out the follow up of any pending tests (i.e. labs, radiological studies) to the [facility].  I have communicated the patient’s plan of care and disposition with the [facility].  The history, relevant review of systems, past medical and surgical history, medical decision making, and physical examination was documented by the scribe in my presence and I attest to the accuracy of the documentation.

## 2022-08-06 NOTE — ED STATDOCS - PATIENT PORTAL LINK FT
yes
You can access the FollowMyHealth Patient Portal offered by [facility] by registering at the following website: [email or web address]. By joining [facility]’s FollowMyHealth portal, you will also be able to view your health information using other applications (apps) compatible with our system.

## 2022-08-06 NOTE — ED STATDOCS - NS ED ROS FT
Constitutional: No fever or chills  Eyes: No visual changes  HEENT: No throat pain  CV: No chest pain  Resp: No SOB no cough  GI: No abd pain, nausea or vomiting +bloody stools   : No dysuria  MSK: No musculoskeletal pain  Skin: No rash  Neuro: No headache

## 2022-08-06 NOTE — ED STATDOCS - OBJECTIVE STATEMENT
See progress note 78 y/o female with a PMhx of diverticulitis, anxiety, thyroid nodule, HLD, aortic stenosis presents to the ED c/o bloody stools. States she had a TAVR on 08/01 and has been having blood stools since. No other complaints.

## 2022-08-07 ENCOUNTER — TRANSCRIPTION ENCOUNTER (OUTPATIENT)
Age: 77
End: 2022-08-07

## 2022-08-08 ENCOUNTER — NON-APPOINTMENT (OUTPATIENT)
Age: 77
End: 2022-08-08

## 2022-08-09 ENCOUNTER — APPOINTMENT (OUTPATIENT)
Dept: CARDIOTHORACIC SURGERY | Facility: CLINIC | Age: 77
End: 2022-08-09

## 2022-08-09 VITALS
HEART RATE: 79 BPM | SYSTOLIC BLOOD PRESSURE: 127 MMHG | WEIGHT: 175 LBS | OXYGEN SATURATION: 98 % | BODY MASS INDEX: 29.88 KG/M2 | HEIGHT: 64 IN | DIASTOLIC BLOOD PRESSURE: 78 MMHG | TEMPERATURE: 98 F | RESPIRATION RATE: 16 BRPM

## 2022-08-09 PROCEDURE — 99214 OFFICE O/P EST MOD 30 MIN: CPT

## 2022-08-11 ENCOUNTER — TRANSCRIPTION ENCOUNTER (OUTPATIENT)
Age: 77
End: 2022-08-11

## 2022-08-15 LAB — SARS-COV-2 N GENE NPH QL NAA+PROBE: NOT DETECTED

## 2022-08-26 ENCOUNTER — TRANSCRIPTION ENCOUNTER (OUTPATIENT)
Age: 77
End: 2022-08-26

## 2022-09-06 ENCOUNTER — APPOINTMENT (OUTPATIENT)
Dept: MAMMOGRAPHY | Facility: CLINIC | Age: 77
End: 2022-09-06

## 2022-09-06 ENCOUNTER — OUTPATIENT (OUTPATIENT)
Dept: OUTPATIENT SERVICES | Facility: HOSPITAL | Age: 77
LOS: 1 days | End: 2022-09-06
Payer: MEDICARE

## 2022-09-06 ENCOUNTER — APPOINTMENT (OUTPATIENT)
Dept: RADIOLOGY | Facility: CLINIC | Age: 77
End: 2022-09-06

## 2022-09-06 ENCOUNTER — RESULT REVIEW (OUTPATIENT)
Age: 77
End: 2022-09-06

## 2022-09-06 DIAGNOSIS — Z98.890 OTHER SPECIFIED POSTPROCEDURAL STATES: Chronic | ICD-10-CM

## 2022-09-06 DIAGNOSIS — H26.9 UNSPECIFIED CATARACT: Chronic | ICD-10-CM

## 2022-09-06 DIAGNOSIS — Z98.891 HISTORY OF UTERINE SCAR FROM PREVIOUS SURGERY: Chronic | ICD-10-CM

## 2022-09-06 DIAGNOSIS — Z90.710 ACQUIRED ABSENCE OF BOTH CERVIX AND UTERUS: Chronic | ICD-10-CM

## 2022-09-06 DIAGNOSIS — Z12.39 ENCOUNTER FOR OTHER SCREENING FOR MALIGNANT NEOPLASM OF BREAST: ICD-10-CM

## 2022-09-06 PROCEDURE — 77080 DXA BONE DENSITY AXIAL: CPT

## 2022-09-06 PROCEDURE — 77067 SCR MAMMO BI INCL CAD: CPT | Mod: 26

## 2022-09-06 PROCEDURE — 77063 BREAST TOMOSYNTHESIS BI: CPT | Mod: 26

## 2022-09-06 PROCEDURE — 77063 BREAST TOMOSYNTHESIS BI: CPT

## 2022-09-06 PROCEDURE — 77067 SCR MAMMO BI INCL CAD: CPT

## 2022-09-06 PROCEDURE — 77080 DXA BONE DENSITY AXIAL: CPT | Mod: 26

## 2022-09-09 ENCOUNTER — TRANSCRIPTION ENCOUNTER (OUTPATIENT)
Age: 77
End: 2022-09-09

## 2022-09-11 ENCOUNTER — NON-APPOINTMENT (OUTPATIENT)
Age: 77
End: 2022-09-11

## 2022-09-12 ENCOUNTER — TRANSCRIPTION ENCOUNTER (OUTPATIENT)
Age: 77
End: 2022-09-12

## 2022-09-14 ENCOUNTER — APPOINTMENT (OUTPATIENT)
Dept: RHEUMATOLOGY | Facility: CLINIC | Age: 77
End: 2022-09-14

## 2022-09-14 ENCOUNTER — APPOINTMENT (OUTPATIENT)
Dept: ORTHOPEDIC SURGERY | Facility: CLINIC | Age: 77
End: 2022-09-14

## 2022-09-14 VITALS — HEIGHT: 64 IN | BODY MASS INDEX: 29.88 KG/M2 | WEIGHT: 175 LBS

## 2022-09-14 PROCEDURE — 72100 X-RAY EXAM L-S SPINE 2/3 VWS: CPT

## 2022-09-14 PROCEDURE — 99441: CPT | Mod: 95

## 2022-09-14 PROCEDURE — 99204 OFFICE O/P NEW MOD 45 MIN: CPT

## 2022-09-14 RX ORDER — TIZANIDINE 2 MG/1
2 TABLET ORAL
Qty: 60 | Refills: 0 | Status: DISCONTINUED | COMMUNITY
Start: 2022-07-19 | End: 2022-09-14

## 2022-09-15 NOTE — HISTORY OF PRESENT ILLNESS
[Lower back] : lower back [Right Leg] : right leg [Sudden] : sudden [8] : 8 [4] : 4 [Dull/Aching] : dull/aching [Radiating] : radiating [Constant] : constant [Household chores] : household chores [Leisure] : leisure [Sleep] : sleep [Social interactions] : social interactions [Meds] : meds [Ice] : ice [Heat] : heat [Retired] : Work status: retired [de-identified] : 78 y/o female presents for an initial eval of right buttock pain radiating into the right lower extremity.  Severity level ranges, but symptoms are constant. Denies back pain. Acute onset of pain after bending down 06/2022. She is able to walk prolonged distances - reports pain radiating into the RLE while standing still. She has not taken any otc nsaids - she is taking Plavix. Reports formal relief from Celebrex. Strength in the BLE is normal.  [] : no [FreeTextEntry3] : June 2022 [FreeTextEntry5] : while housecleaning, bending and kneeling [FreeTextEntry7] : right thigh  into foot [FreeTextEntry9] : celebrex [de-identified] : bending leg, climbing stairs

## 2022-09-15 NOTE — DISCUSSION/SUMMARY
[de-identified] : Discussed and reviewed results of lumbar MRI, and pathology of her symptoms associated with stenosis. Discussed conservative treatments in the form of core strengthening exercises and spinal steroid injections. Continuation of Celebrex as prescribed by cardiologist. Patient was provided with a referral for lumbar physical therapy to work on stretching, strengthening and range of motion. Patient was provided with a lumbar home exercise program. Discussed further treatment with CUATE - discussed discontinuation of Plavix before CUATE. Continuation of hot / cold compresses. Follow up in 4 - 6 weeks. \donald \donald MCINTOSH Acting as a Scribe for Mary Rosenberg, attest that this documentation has been prepared under the direction and in the presence of Provider José Rivera MD.

## 2022-09-15 NOTE — PHYSICAL EXAM
[NL (90)] : forward flexion 90 degrees [NL (30)] : right lateral rotation 30 degrees [NL (45)] : extension 45 degrees [NL (40)] : right lateral bending 40 degrees [5___] : right extensor hallicus longus 5[unfilled]/5 [de-identified] : Constitutional:\par - General Appearance:\par Unremarkable\par Body Habitus\par Well Developed\par Well Nourished\par Body Habitus\par No Deformities\par Well Groomed\par Ability To communicate:\par Normal\par Neurologic:\par Global sensation is intact to upper and lower extremities. See examination of Neck and/or Spine\par for exceptions.\par Orientation to Time, Place and Person is: Normal\par Mood And Affect is Normal\par Skin:\par - Head/Face, Right Upper/Lower Extremity, Left Upper/Lower Extremity: Normal\par See Examination of Neck and/or Spine for exceptions\par Cardiovascular:\par Peripheral Cardiovascular System is Normal\par Palpation of Lymph Nodes:\par Normal Palpation of lymph nodes in: Axilla, Cervical, Inguinal\par Abnormal Palpation of lymph nodes in: None  [] : non-antalgic [FreeTextEntry1] : Varying degrees of DDD. Good lordosis. L4-5 spondylolisthesis.

## 2022-09-16 ENCOUNTER — APPOINTMENT (OUTPATIENT)
Dept: DERMATOLOGY | Facility: CLINIC | Age: 77
End: 2022-09-16

## 2022-09-16 DIAGNOSIS — L98.8 OTHER SPECIFIED DISORDERS OF THE SKIN AND SUBCUTANEOUS TISSUE: ICD-10-CM

## 2022-09-16 PROCEDURE — 99213 OFFICE O/P EST LOW 20 MIN: CPT

## 2022-09-16 NOTE — PHYSICAL EXAM
[Full Body Skin Exam Performed] : performed [FreeTextEntry3] : Skin examination performed of the face, neck, trunk, arms, legs; \par The patient is well, alert and oriented, pleasant and cooperative.\par Eyelids, conjunctivae, oral mucosa, digits and nails all normal.  \par No cervical adenopathy.\par \par Normal findings include:\par \par Seborrheic keratoses- macular lesions on lower legs; \par Angiomas\par Lentigines\par ecchymoses;  LEs; \par \par No lesions were suspicious for malignancy. \par \par

## 2022-09-16 NOTE — HISTORY OF PRESENT ILLNESS
[de-identified] : Pt. presents for skin check;\par c/o few spots of concern;  \par Severity:  mild  \par Modifying factors:  none\par Associated symptoms:  none\par Context:  no association with activity \par

## 2022-09-16 NOTE — ASSESSMENT
[FreeTextEntry1] : Complete skin examination is negative for malignancy; Multiple new concerns were addressed and discussed.\par Therapeutic options and their risks and benefits; along with multiple diagnostic possibilities were discussed at length;\par risks and benefits of skin biopsy and/or other further study were discussed;\par \par bruising;  now on plavix 2' valve repair; 8/2022\par \par Continue regular exams; Follow up for TBSE in 1 year \par \par \par \par

## 2022-09-28 RX ORDER — ZOLEDRONIC ACID 5 MG/100ML
5 INJECTION INTRAVENOUS
Qty: 1 | Refills: 0 | Status: ACTIVE | COMMUNITY
Start: 2022-09-14

## 2022-09-30 RX ORDER — ZOLEDRONIC ACID 5 MG/100ML
5 INJECTION INTRAVENOUS
Qty: 0 | Refills: 0 | Status: COMPLETED | OUTPATIENT
Start: 2022-09-30 | End: 1900-01-01

## 2022-10-05 ENCOUNTER — APPOINTMENT (OUTPATIENT)
Dept: RHEUMATOLOGY | Facility: CLINIC | Age: 77
End: 2022-10-05

## 2022-10-05 VITALS
TEMPERATURE: 98 F | DIASTOLIC BLOOD PRESSURE: 62 MMHG | SYSTOLIC BLOOD PRESSURE: 105 MMHG | OXYGEN SATURATION: 97 % | RESPIRATION RATE: 18 BRPM | HEART RATE: 95 BPM

## 2022-10-05 VITALS
RESPIRATION RATE: 17 BRPM | DIASTOLIC BLOOD PRESSURE: 62 MMHG | HEART RATE: 93 BPM | SYSTOLIC BLOOD PRESSURE: 105 MMHG | OXYGEN SATURATION: 98 % | TEMPERATURE: 98 F

## 2022-10-05 PROCEDURE — 96374 THER/PROPH/DIAG INJ IV PUSH: CPT | Mod: 59

## 2022-10-05 RX ORDER — ZOLEDRONIC ACID 5 MG/100ML
5 INJECTION INTRAVENOUS
Qty: 0 | Refills: 0 | Status: COMPLETED | OUTPATIENT
Start: 2022-09-30

## 2022-10-26 ENCOUNTER — APPOINTMENT (OUTPATIENT)
Dept: ORTHOPEDIC SURGERY | Facility: CLINIC | Age: 77
End: 2022-10-26

## 2022-10-26 VITALS — HEIGHT: 64 IN | WEIGHT: 175 LBS | BODY MASS INDEX: 29.88 KG/M2

## 2022-10-26 PROCEDURE — 99214 OFFICE O/P EST MOD 30 MIN: CPT

## 2022-10-30 NOTE — HISTORY OF PRESENT ILLNESS
[Lower back] : lower back [Right Leg] : right leg [Sudden] : sudden [8] : 8 [4] : 4 [Dull/Aching] : dull/aching [Radiating] : radiating [Constant] : constant [Household chores] : household chores [Leisure] : leisure [Sleep] : sleep [Social interactions] : social interactions [Meds] : meds [Ice] : ice [Heat] : heat [Retired] : Work status: retired [de-identified] : 10/26/2022 - 78 y/o female presenting for a follow up. se states improved symptoms, but not 100%. Patient is on Plavix. She continues to experience pain in the lateral region of the right thigh and buttock. Relief from piriformis stretching and home rehab. Difficulty with prolonged standing. Denies pain walking. \par \par 09/14/2022: 78 y/o female presents for an initial eval of right buttock pain radiating into the right lower extremity.  Severity level ranges, but symptoms are constant. Denies back pain. Acute onset of pain after bending down 06/2022. She is able to walk prolonged distances - reports pain radiating into the RLE while standing still. She has not taken any otc nsaids - she is taking Plavix. Reports formal relief from Celebrex. Strength in the BLE is normal.  [] : no [FreeTextEntry3] : June 2022 [FreeTextEntry5] : while housecleaning, bending and kneeling [FreeTextEntry7] : right thigh  into foot [FreeTextEntry9] : celebrex [de-identified] : bending leg, climbing stairs

## 2022-10-30 NOTE — PHYSICAL EXAM
[NL (90)] : forward flexion 90 degrees [NL (30)] : right lateral rotation 30 degrees [NL (45)] : extension 45 degrees [NL (40)] : right lateral bending 40 degrees [5___] : right extensor hallicus longus 5[unfilled]/5 [Normal Coordination] : normal coordination [Normal DTR UE/LE] : normal DTR UE/LE  [Normal Sensation] : normal sensation [Normal Mood and Affect] : normal mood and affect [Orientated] : orientated [Able to Communicate] : able to communicate [Normal Skin] : normal skin [No Rash] : no rash [No Ulcers] : no ulcers [No Lesions] : no lesions [No obvious lymphadenopathy in areas examined] : no obvious lymphadenopathy in areas examined [Well Developed] : well developed [Well Nourished] : well nourished [Peripheral vascular exam is grossly normal] : peripheral vascular exam is grossly normal [No Respiratory Distress] : no respiratory distress [de-identified] : Constitutional:\par - General Appearance:\par Unremarkable\par Body Habitus\par Well Developed\par Well Nourished\par Body Habitus\par No Deformities\par Well Groomed\par Ability To communicate:\par Normal\par Neurologic:\par Global sensation is intact to upper and lower extremities. See examination of Neck and/or Spine\par for exceptions.\par Orientation to Time, Place and Person is: Normal\par Mood And Affect is Normal\par Skin:\par - Head/Face, Right Upper/Lower Extremity, Left Upper/Lower Extremity: Normal\par See Examination of Neck and/or Spine for exceptions\par Cardiovascular:\par Peripheral Cardiovascular System is Normal\par Palpation of Lymph Nodes:\par Normal Palpation of lymph nodes in: Axilla, Cervical, Inguinal\par Abnormal Palpation of lymph nodes in: None  [] : non-antalgic [FreeTextEntry1] : Varying degrees of DDD. Good lordosis. L4-5 spondylolisthesis.

## 2022-10-30 NOTE — DISCUSSION/SUMMARY
[de-identified] : Rediscussed pathology of her symptoms associated with stenosis L4-5. Discussed conservative treatments in the form of core strengthening exercises and spinal steroid injections. Patient was advised to continue to work on stretching, strengthening and range of motion. Discussed further treatment with CUATE - discussed discontinuation of Plavix before CUATE (patient will consult with cardiology). Continuation of hot / cold compresses. I am prescribing the patient MDP for pain relief. Titration schedule provided. Patient will f/u in 6 weeks. \par \par Prior to appointment and during encounter with patient extensive medical records were reviewed including but not limited to, hospital records, outpatient records, imaging results, and lab data.During this appointment the patient was examined, diagnoses were discussed and explained in a face to face manner. In addition extensive time was spent reviewing aforementioned diagnostic studies. Counseling including abnormal image results, differential diagnoses, treatment options, risk and benefits, lifestyle changes, current condition, and current medications was performed. Patient's comments, questions, and concerns were addressed and patient verbalized understanding. Based on this patient's presentation at our office, which is an orthopedic spine surgeon's office, this patient inherently / intrinsically has a risk, however minute, of developing issues such as Cauda equina syndrome, bowel and bladder changes, or progression of motor or neurological deficits such as paralysis which may be permanent.\par \par MARY JACKSON Acting as a Scribe for Dr. Britney MARTI, Mary Jackson, attest that this documentation has been prepared under the direction and in the presence of Provider José Rivera MD.

## 2022-11-17 ENCOUNTER — TRANSCRIPTION ENCOUNTER (OUTPATIENT)
Age: 77
End: 2022-11-17

## 2022-11-17 ENCOUNTER — APPOINTMENT (OUTPATIENT)
Dept: PAIN MANAGEMENT | Facility: CLINIC | Age: 77
End: 2022-11-17

## 2022-11-18 ENCOUNTER — TRANSCRIPTION ENCOUNTER (OUTPATIENT)
Age: 77
End: 2022-11-18

## 2022-12-02 ENCOUNTER — APPOINTMENT (OUTPATIENT)
Dept: ORTHOPEDIC SURGERY | Facility: CLINIC | Age: 77
End: 2022-12-02

## 2022-12-07 ENCOUNTER — APPOINTMENT (OUTPATIENT)
Dept: INTERNAL MEDICINE | Facility: CLINIC | Age: 77
End: 2022-12-07

## 2022-12-07 VITALS
TEMPERATURE: 98.7 F | HEART RATE: 87 BPM | SYSTOLIC BLOOD PRESSURE: 122 MMHG | BODY MASS INDEX: 29.88 KG/M2 | HEIGHT: 64 IN | OXYGEN SATURATION: 98 % | DIASTOLIC BLOOD PRESSURE: 76 MMHG | WEIGHT: 175 LBS

## 2022-12-07 DIAGNOSIS — E55.9 VITAMIN D DEFICIENCY, UNSPECIFIED: ICD-10-CM

## 2022-12-07 DIAGNOSIS — I35.0 NONRHEUMATIC AORTIC (VALVE) STENOSIS: ICD-10-CM

## 2022-12-07 DIAGNOSIS — Z00.00 ENCOUNTER FOR GENERAL ADULT MEDICAL EXAMINATION W/OUT ABNORMAL FINDINGS: ICD-10-CM

## 2022-12-07 LAB
25(OH)D3 SERPL-MCNC: 46.6 NG/ML
ALBUMIN SERPL ELPH-MCNC: 4.4 G/DL
ALP BLD-CCNC: 58 U/L
ALT SERPL-CCNC: 15 U/L
ANION GAP SERPL CALC-SCNC: 12 MMOL/L
AST SERPL-CCNC: 20 U/L
BASOPHILS # BLD AUTO: 0.03 K/UL
BASOPHILS NFR BLD AUTO: 0.5 %
BILIRUB SERPL-MCNC: 0.4 MG/DL
BUN SERPL-MCNC: 19 MG/DL
CALCIUM SERPL-MCNC: 9.1 MG/DL
CHLORIDE SERPL-SCNC: 106 MMOL/L
CHOLEST SERPL-MCNC: 181 MG/DL
CO2 SERPL-SCNC: 23 MMOL/L
CREAT SERPL-MCNC: 0.88 MG/DL
EGFR: 67 ML/MIN/1.73M2
EOSINOPHIL # BLD AUTO: 0.25 K/UL
EOSINOPHIL NFR BLD AUTO: 4.1 %
ESTIMATED AVERAGE GLUCOSE: 120 MG/DL
GLUCOSE SERPL-MCNC: 86 MG/DL
HBA1C MFR BLD HPLC: 5.8 %
HCT VFR BLD CALC: 37.7 %
HDLC SERPL-MCNC: 54 MG/DL
HGB BLD-MCNC: 11.7 G/DL
IMM GRANULOCYTES NFR BLD AUTO: 0.5 %
LDLC SERPL CALC-MCNC: 92 MG/DL
LYMPHOCYTES # BLD AUTO: 1.44 K/UL
LYMPHOCYTES NFR BLD AUTO: 23.8 %
MAN DIFF?: NORMAL
MCHC RBC-ENTMCNC: 30.7 PG
MCHC RBC-ENTMCNC: 31 GM/DL
MCV RBC AUTO: 99 FL
MONOCYTES # BLD AUTO: 0.54 K/UL
MONOCYTES NFR BLD AUTO: 8.9 %
NEUTROPHILS # BLD AUTO: 3.77 K/UL
NEUTROPHILS NFR BLD AUTO: 62.2 %
NONHDLC SERPL-MCNC: 127 MG/DL
PLATELET # BLD AUTO: 258 K/UL
POTASSIUM SERPL-SCNC: 4.2 MMOL/L
PROT SERPL-MCNC: 6.7 G/DL
RBC # BLD: 3.81 M/UL
RBC # FLD: 13.7 %
SODIUM SERPL-SCNC: 141 MMOL/L
TRIGL SERPL-MCNC: 173 MG/DL
TSH SERPL-ACNC: 1.07 UIU/ML
WBC # FLD AUTO: 6.06 K/UL

## 2022-12-07 PROCEDURE — G0439: CPT

## 2022-12-07 RX ORDER — ASPIRIN ENTERIC COATED TABLETS 81 MG 81 MG/1
81 TABLET, DELAYED RELEASE ORAL DAILY
Refills: 0 | Status: ACTIVE | COMMUNITY
Start: 2022-08-05

## 2022-12-07 NOTE — HEALTH RISK ASSESSMENT
[Never] : Never [Yes] : Yes [2 - 4 times a month (2 pts)] : 2-4 times a month (2 points) [1 or 2 (0 pts)] : 1 or 2 (0 points) [Never (0 pts)] : Never (0 points) [No] : In the past 12 months have you used drugs other than those required for medical reasons? No [Patient reported mammogram was normal] : Patient reported mammogram was normal [Patient reported PAP Smear was normal] : Patient reported PAP Smear was normal [Patient reported bone density results were abnormal] : Patient reported bone density results were abnormal [Patient reported colonoscopy was normal] : Patient reported colonoscopy was normal [EyeExamDate] : 01/2020 [MammogramDate] : 09/6/22 [PapSmearDate] : 07/2022 [BoneDensityDate] : 09/06/22 [BoneDensityComments] : osteopenia [ColonoscopyDate] : 08/06/18 [ColonoscopyComments] : -hx of diverticulitis

## 2022-12-07 NOTE — HISTORY OF PRESENT ILLNESS
[FreeTextEntry1] : CPE [de-identified] : FATUMA BRISCOE is a 77 year F who comes in for an annual physical exam.\par Pt with hx of AS s/p TAVR, anxiety disorder, HLD, Hypothyroidism, Osteopenia, Lumbar spondylolisthesis s/p 2 CUATE steroid with ortho comes in for AWV. \par \par \par Patient denies any cp, sob,abdominal pain, nausea, vomiting, palpitations, fever, chills, constipation, diarrhea.\par

## 2022-12-07 NOTE — ASSESSMENT
[FreeTextEntry1] : 1.HM: reviewed all her recent labs today. UTD with covid and influenza vaccine, mammogram/pap smear, BMD and will f/u with  for repeat colonoscopy when off Plavix. \par Patient counseled regarding recommendations for vaccines, diet and exercise and all preventative screening.\par \par 2.Prediabetes: recheck hba1c in 4 mo, Pt advised to keep diabetic diet, decrease carbs and increase dietary protein intake.\par Exercise as tolerated 3-4 times a week.\par \par 3. HLD: doing well on Crestor 5 mg, Patient advised on low cholesterol diet-decrease in white carbs and exercise 150 minutes per week.\par \par 4.Lumbar spondylolisthesis: s/p 2 epidural injection. \par \par 5.Anxiety: c/w sertraline 50 mg once daily. Counseling provided to patient. \par \par PT will be going to [place] for the winter. \par \par

## 2022-12-20 ENCOUNTER — TRANSCRIPTION ENCOUNTER (OUTPATIENT)
Age: 77
End: 2022-12-20

## 2022-12-22 ENCOUNTER — NON-APPOINTMENT (OUTPATIENT)
Age: 77
End: 2022-12-22

## 2023-02-09 NOTE — PRE-OP CHECKLIST - LATEX ALLERGY
Behavioral Health Community Health Worker  Initial Assessment  Completed by:  Jodie Clark    Date:  2/9/2023    Patient Enrollment in Behavioral Health Program:  Patient verbalized understanding of Behavioral Health Integration services to include:  Patient understands that CHW, LCSW, PharmD and consulting Psychiatrist are members of the care team working collaboratively with his/her primary care provider: Yes  Patient understands that activation of their [facility] patient portal account is required for accessing the full scope of team services: Yes  Patient understands that some counseling sessions may occur via video: Yes  Clinic visits with the psychiatrist may be subject to a co-pay based on your insurance: Yes  Patient consents to enroll in BHI program: Yes    Assessments     Single Item Health Literacy Scale:  How often do you need to have someone help you read instructions, pamphlets or other written material from your doctor or pharmacy?: Never    Promis 10:  Promis 10 Responses  In general, would you say your health is: Poor  In general, would you say your quality of life is: Fair  In general, how would you rate your physical health?: Poor  In general, how would you rate your mental health, including your mood and your ability to think?: Fair  In general, how would you rate your satisfaction with your social activities and relationships?: Good  In general, please rate how well you carry out your usual social activities and roles. (This includes activities at home, at work and in your community, and responsibilities as a parent, child, spouse, employee, friend, etc.): Poor  To what extent are you able to carry out your everyday physical activities such as walking, climbing stairs, carrying groceries, or moving a chair? : Not at all  How often have you been bothered by emotional problems such as feeling anxious, depressed or irritable?: Sometimes  In the past 7 days, how would you rate your fatigue on  average?: Severe  In the past 7 days, on a scale of 0 to 10 (where 0 is no pain and 10 is the worst pain imaginable) how would you rate your pain on average?: 9  Global Physical Health: 15  Global Mental health Score: 10    Depression PHQ:  PHQ9 2/9/2023   Total Score 9         Generalized Anxiety Disorder 7-Item Scale:  GAD7 2/9/2023   1. Feeling nervous, anxious, or on edge? 0   2. Not being able to stop or control worrying? 1   3. Worrying too much about different things? 1   4. Trouble relaxing? 0   5. Being so restless that it is hard to sit still? 3   6. Becoming easily annoyed or irritable? 1   7. Feeling afraid as if something awful might happen? 0   8. If you checked off any problems, how difficult have these problems made it for you to do your work, take care of things at home, or get along with other people? 0   WILMA-7 Score 6       History     Social History     Socioeconomic History    Marital status: Single   Tobacco Use    Smoking status: Never    Smokeless tobacco: Never   Substance and Sexual Activity    Alcohol use: Yes     Comment: occasional    Drug use: No    Sexual activity: Not Currently     Social Determinants of Health     Financial Resource Strain: Low Risk     Difficulty of Paying Living Expenses: Not hard at all   Food Insecurity: No Food Insecurity    Worried About Running Out of Food in the Last Year: Never true    Ran Out of Food in the Last Year: Never true   Transportation Needs: No Transportation Needs    Lack of Transportation (Medical): No    Lack of Transportation (Non-Medical): No   Physical Activity: Inactive    Days of Exercise per Week: 0 days    Minutes of Exercise per Session: 0 min   Stress: No Stress Concern Present    Feeling of Stress : Not at all   Social Connections: Moderately Integrated    Frequency of Communication with Friends and Family: More than three times a week    Frequency of Social Gatherings with Friends and Family: More than three times a week    Attends  "Gnosticism Services: More than 4 times per year    Active Member of Clubs or Organizations: Yes    Attends Club or Organization Meetings: More than 4 times per year    Marital Status: Never    Housing Stability: High Risk    Unable to Pay for Housing in the Last Year: Yes    Number of Places Lived in the Last Year: 1    Unstable Housing in the Last Year: No       Call Summary   Patient was referred to the BHI (Non-opioid) program by Primary Care Provider, Dr. Solorzano CHW contacted Cherry Santiago who reports depressed mood that limits [his/her] activities of daily living (ADLs).   Patient scored "9" on the PHQ9 and "6" on the WILMA 7. Based on these scores patient is eligible for the Behavioral health Integration (Non-opioid) Program. CHW completed the intake and scheduled an appointment for patient with Tabatha Herrera LCSW, on 2/10/23.Patient given crisis line information.          " no

## 2023-03-07 ENCOUNTER — RX RENEWAL (OUTPATIENT)
Age: 78
End: 2023-03-07

## 2023-03-09 ENCOUNTER — TRANSCRIPTION ENCOUNTER (OUTPATIENT)
Age: 78
End: 2023-03-09

## 2023-05-30 ENCOUNTER — TRANSCRIPTION ENCOUNTER (OUTPATIENT)
Age: 78
End: 2023-05-30

## 2023-05-30 DIAGNOSIS — R73.9 HYPERGLYCEMIA, UNSPECIFIED: ICD-10-CM

## 2023-06-01 ENCOUNTER — EMERGENCY (EMERGENCY)
Facility: HOSPITAL | Age: 78
LOS: 0 days | Discharge: HOME | End: 2023-06-01
Attending: STUDENT IN AN ORGANIZED HEALTH CARE EDUCATION/TRAINING PROGRAM
Payer: MEDICARE

## 2023-06-01 VITALS — HEIGHT: 64 IN | WEIGHT: 177.91 LBS

## 2023-06-01 VITALS
SYSTOLIC BLOOD PRESSURE: 146 MMHG | HEART RATE: 85 BPM | OXYGEN SATURATION: 98 % | RESPIRATION RATE: 18 BRPM | DIASTOLIC BLOOD PRESSURE: 86 MMHG

## 2023-06-01 DIAGNOSIS — Z98.890 OTHER SPECIFIED POSTPROCEDURAL STATES: ICD-10-CM

## 2023-06-01 DIAGNOSIS — Z90.710 ACQUIRED ABSENCE OF BOTH CERVIX AND UTERUS: ICD-10-CM

## 2023-06-01 DIAGNOSIS — H26.9 UNSPECIFIED CATARACT: ICD-10-CM

## 2023-06-01 DIAGNOSIS — Z90.710 ACQUIRED ABSENCE OF BOTH CERVIX AND UTERUS: Chronic | ICD-10-CM

## 2023-06-01 DIAGNOSIS — Z91.048 OTHER NONMEDICINAL SUBSTANCE ALLERGY STATUS: ICD-10-CM

## 2023-06-01 DIAGNOSIS — Z98.890 OTHER SPECIFIED POSTPROCEDURAL STATES: Chronic | ICD-10-CM

## 2023-06-01 DIAGNOSIS — E78.5 HYPERLIPIDEMIA, UNSPECIFIED: ICD-10-CM

## 2023-06-01 DIAGNOSIS — Z87.19 PERSONAL HISTORY OF OTHER DISEASES OF THE DIGESTIVE SYSTEM: ICD-10-CM

## 2023-06-01 DIAGNOSIS — Z88.0 ALLERGY STATUS TO PENICILLIN: ICD-10-CM

## 2023-06-01 DIAGNOSIS — Z79.02 LONG TERM (CURRENT) USE OF ANTITHROMBOTICS/ANTIPLATELETS: ICD-10-CM

## 2023-06-01 DIAGNOSIS — R10.32 LEFT LOWER QUADRANT PAIN: ICD-10-CM

## 2023-06-01 DIAGNOSIS — I35.0 NONRHEUMATIC AORTIC (VALVE) STENOSIS: ICD-10-CM

## 2023-06-01 DIAGNOSIS — K57.32 DIVERTICULITIS OF LARGE INTESTINE WITHOUT PERFORATION OR ABSCESS WITHOUT BLEEDING: ICD-10-CM

## 2023-06-01 DIAGNOSIS — E03.9 HYPOTHYROIDISM, UNSPECIFIED: ICD-10-CM

## 2023-06-01 DIAGNOSIS — Z98.891 HISTORY OF UTERINE SCAR FROM PREVIOUS SURGERY: Chronic | ICD-10-CM

## 2023-06-01 DIAGNOSIS — H26.9 UNSPECIFIED CATARACT: Chronic | ICD-10-CM

## 2023-06-01 DIAGNOSIS — Z79.82 LONG TERM (CURRENT) USE OF ASPIRIN: ICD-10-CM

## 2023-06-01 DIAGNOSIS — D72.829 ELEVATED WHITE BLOOD CELL COUNT, UNSPECIFIED: ICD-10-CM

## 2023-06-01 DIAGNOSIS — F41.9 ANXIETY DISORDER, UNSPECIFIED: ICD-10-CM

## 2023-06-01 LAB
ALBUMIN SERPL ELPH-MCNC: 4.2 G/DL — SIGNIFICANT CHANGE UP (ref 3.3–5)
ALP SERPL-CCNC: 60 U/L — SIGNIFICANT CHANGE UP (ref 40–120)
ALT FLD-CCNC: 29 U/L — SIGNIFICANT CHANGE UP (ref 12–78)
ANION GAP SERPL CALC-SCNC: 4 MMOL/L — LOW (ref 5–17)
AST SERPL-CCNC: 24 U/L — SIGNIFICANT CHANGE UP (ref 15–37)
BASOPHILS # BLD AUTO: 0.03 K/UL — SIGNIFICANT CHANGE UP (ref 0–0.2)
BASOPHILS NFR BLD AUTO: 0.3 % — SIGNIFICANT CHANGE UP (ref 0–2)
BILIRUB SERPL-MCNC: 0.5 MG/DL — SIGNIFICANT CHANGE UP (ref 0.2–1.2)
BUN SERPL-MCNC: 17 MG/DL — SIGNIFICANT CHANGE UP (ref 7–23)
CALCIUM SERPL-MCNC: 9.4 MG/DL — SIGNIFICANT CHANGE UP (ref 8.5–10.1)
CHLORIDE SERPL-SCNC: 110 MMOL/L — HIGH (ref 96–108)
CO2 SERPL-SCNC: 26 MMOL/L — SIGNIFICANT CHANGE UP (ref 22–31)
CREAT SERPL-MCNC: 0.93 MG/DL — SIGNIFICANT CHANGE UP (ref 0.5–1.3)
EGFR: 63 ML/MIN/1.73M2 — SIGNIFICANT CHANGE UP
EOSINOPHIL # BLD AUTO: 0.05 K/UL — SIGNIFICANT CHANGE UP (ref 0–0.5)
EOSINOPHIL NFR BLD AUTO: 0.4 % — SIGNIFICANT CHANGE UP (ref 0–6)
GLUCOSE SERPL-MCNC: 108 MG/DL — HIGH (ref 70–99)
HCT VFR BLD CALC: 39.7 % — SIGNIFICANT CHANGE UP (ref 34.5–45)
HGB BLD-MCNC: 13.2 G/DL — SIGNIFICANT CHANGE UP (ref 11.5–15.5)
IMM GRANULOCYTES NFR BLD AUTO: 0.3 % — SIGNIFICANT CHANGE UP (ref 0–0.9)
LACTATE SERPL-SCNC: 1.4 MMOL/L — SIGNIFICANT CHANGE UP (ref 0.7–2)
LIDOCAIN IGE QN: 275 U/L — SIGNIFICANT CHANGE UP (ref 73–393)
LYMPHOCYTES # BLD AUTO: 1.35 K/UL — SIGNIFICANT CHANGE UP (ref 1–3.3)
LYMPHOCYTES # BLD AUTO: 11.6 % — LOW (ref 13–44)
MCHC RBC-ENTMCNC: 31.4 PG — SIGNIFICANT CHANGE UP (ref 27–34)
MCHC RBC-ENTMCNC: 33.2 GM/DL — SIGNIFICANT CHANGE UP (ref 32–36)
MCV RBC AUTO: 94.5 FL — SIGNIFICANT CHANGE UP (ref 80–100)
MONOCYTES # BLD AUTO: 0.62 K/UL — SIGNIFICANT CHANGE UP (ref 0–0.9)
MONOCYTES NFR BLD AUTO: 5.3 % — SIGNIFICANT CHANGE UP (ref 2–14)
NEUTROPHILS # BLD AUTO: 9.53 K/UL — HIGH (ref 1.8–7.4)
NEUTROPHILS NFR BLD AUTO: 82.1 % — HIGH (ref 43–77)
PLATELET # BLD AUTO: 265 K/UL — SIGNIFICANT CHANGE UP (ref 150–400)
POTASSIUM SERPL-MCNC: 4.9 MMOL/L — SIGNIFICANT CHANGE UP (ref 3.5–5.3)
POTASSIUM SERPL-SCNC: 4.9 MMOL/L — SIGNIFICANT CHANGE UP (ref 3.5–5.3)
PROT SERPL-MCNC: 7.7 GM/DL — SIGNIFICANT CHANGE UP (ref 6–8.3)
RBC # BLD: 4.2 M/UL — SIGNIFICANT CHANGE UP (ref 3.8–5.2)
RBC # FLD: 12.6 % — SIGNIFICANT CHANGE UP (ref 10.3–14.5)
SODIUM SERPL-SCNC: 140 MMOL/L — SIGNIFICANT CHANGE UP (ref 135–145)
WBC # BLD: 11.62 K/UL — HIGH (ref 3.8–10.5)
WBC # FLD AUTO: 11.62 K/UL — HIGH (ref 3.8–10.5)

## 2023-06-01 PROCEDURE — 85025 COMPLETE CBC W/AUTO DIFF WBC: CPT

## 2023-06-01 PROCEDURE — 83605 ASSAY OF LACTIC ACID: CPT

## 2023-06-01 PROCEDURE — 99284 EMERGENCY DEPT VISIT MOD MDM: CPT | Mod: FS

## 2023-06-01 PROCEDURE — 74177 CT ABD & PELVIS W/CONTRAST: CPT | Mod: MA

## 2023-06-01 PROCEDURE — 36415 COLL VENOUS BLD VENIPUNCTURE: CPT

## 2023-06-01 PROCEDURE — 80053 COMPREHEN METABOLIC PANEL: CPT

## 2023-06-01 PROCEDURE — 99284 EMERGENCY DEPT VISIT MOD MDM: CPT | Mod: 25

## 2023-06-01 PROCEDURE — 83690 ASSAY OF LIPASE: CPT

## 2023-06-01 PROCEDURE — 74177 CT ABD & PELVIS W/CONTRAST: CPT | Mod: 26,MA

## 2023-06-01 RX ORDER — CIPROFLOXACIN LACTATE 400MG/40ML
500 VIAL (ML) INTRAVENOUS ONCE
Refills: 0 | Status: COMPLETED | OUTPATIENT
Start: 2023-06-01 | End: 2023-06-01

## 2023-06-01 RX ORDER — CIPROFLOXACIN LACTATE 400MG/40ML
1 VIAL (ML) INTRAVENOUS
Qty: 14 | Refills: 0
Start: 2023-06-01 | End: 2023-06-07

## 2023-06-01 RX ORDER — SODIUM CHLORIDE 9 MG/ML
1000 INJECTION INTRAMUSCULAR; INTRAVENOUS; SUBCUTANEOUS ONCE
Refills: 0 | Status: COMPLETED | OUTPATIENT
Start: 2023-06-01 | End: 2023-06-01

## 2023-06-01 RX ORDER — METRONIDAZOLE 500 MG
1 TABLET ORAL
Qty: 21 | Refills: 0
Start: 2023-06-01 | End: 2023-06-07

## 2023-06-01 RX ORDER — METRONIDAZOLE 500 MG
500 TABLET ORAL ONCE
Refills: 0 | Status: COMPLETED | OUTPATIENT
Start: 2023-06-01 | End: 2023-06-01

## 2023-06-01 RX ADMIN — Medication 500 MILLIGRAM(S): at 12:19

## 2023-06-01 RX ADMIN — Medication 500 MILLIGRAM(S): at 12:26

## 2023-06-01 RX ADMIN — SODIUM CHLORIDE 1000 MILLILITER(S): 9 INJECTION INTRAMUSCULAR; INTRAVENOUS; SUBCUTANEOUS at 10:52

## 2023-06-01 NOTE — ED STATDOCS - CARE PROVIDER_API CALL
Fred Chang  Gastroenterology  [address] [facility] [facility], [place] [address]  Phone: [phone]  Fax: [phone]  Established Patient  Follow Up Time:

## 2023-06-01 NOTE — ED ADULT NURSE NOTE - CHIEF COMPLAINT QUOTE
Pt arrives to ED complaining of abdominal pain started last night with constipation. Hx diverticulitis.

## 2023-06-01 NOTE — ED STATDOCS - ATTENDING APP SHARED VISIT CONTRIBUTION OF CARE
I, Gerardo Villalba MD,  performed the initial face to face bedside interview with this patient regarding history of present illness, review of symptoms and relevant past medical, social and family history.  I completed an independent physical examination.  I was the initial provider who evaluated this patient.   I personally saw the patient and performed a substantive portion of the visit including all aspects of the medical decision making.  I have signed out the follow up of any pending tests (i.e. labs, radiological studies) to the [facility].  I have communicated the patient’s plan of care and disposition with the [facility].  The history, relevant review of systems, past medical and surgical history, medical decision making, and physical examination was documented by the scribe in my presence and I attest to the accuracy of the documentation.

## 2023-06-01 NOTE — ED ADULT TRIAGE NOTE - CHIEF COMPLAINT QUOTE
Pt arrives to ED complaining of abdominal pain started last night with constipation. Hx diverticulitis. Illumination Time: 00:16:40

## 2023-06-01 NOTE — ED STATDOCS - PHYSICAL EXAMINATION
Constitutional: Awake, Alert, non-toxic. No acute distress.  HEAD: Normocephalic, atraumatic.   EYES: PERRL, EOM intact, conjunctiva and sclera are clear bilaterally.  ENT: External ears normal. No rhinorrhea, no tracheal deviation   NECK: Supple, non-tender  CARDIOVASCULAR: regular rate and rhythm.  RESPIRATORY: Normal respiratory effort; breath sounds CTAB, no wheezes, rhonchi, or rales. Speaking in full sentences. No accessory muscle use.   ABDOMEN: Soft; +LLQ pain, non-distended. No rebound or guarding.   MSK:  no lower extremity edema, no deformities  SKIN: Warm, dry  NEURO: A&O x3. Sensory and motor functions are grossly intact. Speech is normal. No facial droop.  PSYCH: Appearance and judgement seem appropriate for gender and age.

## 2023-06-01 NOTE — ED ADULT NURSE NOTE - CHPI ED NUR SYMPTOMS NEG
no abdominal distension/no blood in stool/no burning urination/no chills/no diarrhea/no dysuria/no fever/no hematuria/no vomiting no abdominal distension/no blood in stool/no burning urination/no chills/no dysuria/no fever/no hematuria/no nausea/no vomiting

## 2023-06-01 NOTE — ED STATDOCS - OBJECTIVE STATEMENT
78 y/o female on Plavix w/ PMHx of diverticulitis, HLD, hypothyroidism, aortic stenosis p/w left lower abd pain for the past couple of days, states it feels like her previous episodes of diverticulitis. Pt's GI is Dr. Chang. Pt also c/o constipation. States she has had has been hospitalized in the past for diverticulitis w/ abscess. Denies any urinary complaints.

## 2023-06-01 NOTE — ED STATDOCS - NSFOLLOWUPINSTRUCTIONS_ED_ALL_ED_FT
Diverticulitis is infection or inflammation of small pouches (diverticula) in the colon that form due to a condition called diverticulosis. Diverticula can trap stool (feces) and bacteria, causing infection and inflammation.    Diverticulitis may cause severe stomach pain and diarrhea. It may lead to tissue damage in the colon that causes bleeding or blockage. The diverticula may also burst (rupture) and cause infected stool to enter other areas of the abdomen.    What are the causes?  This condition is caused by stool becoming trapped in the diverticula, which allows bacteria to grow in the diverticula. This leads to inflammation and infection.    What increases the risk?  You are more likely to develop this condition if you have diverticulosis. The risk increases if you:  Are overweight or obese.  Do not get enough exercise.  Drink alcohol.  Use tobacco products.  Eat a diet that has a lot of red meat such as beef, pork, or lamb.  Eat a diet that does not include enough fiber. High-fiber foods include fruits, vegetables, beans, nuts, and whole grains.  Are over 40 years of age.  What are the signs or symptoms?  Symptoms of this condition may include:  Pain and tenderness in the abdomen. The pain is normally located on the left side of the abdomen, but it may occur in other areas.  Fever and chills.  Nausea.  Vomiting.  Cramping.  Bloating.  Changes in bowel routines.  Blood in your stool.  How is this diagnosed?  This condition is diagnosed based on:  Your medical history.  A physical exam.  Tests to make sure there is nothing else causing your condition. These tests may include:  Blood tests.  Urine tests.  CT scan of the abdomen.  How is this treated?  Most cases of this condition are mild and can be treated at home. Treatment may include:  Taking over-the-counter pain medicines.  Following a clear liquid diet.  Taking antibiotic medicines by mouth.  Resting.  More severe cases may need to be treated at a hospital. Treatment may include:  Not eating or drinking.  Taking prescription pain medicine.  Receiving antibiotic medicines through an IV.  Receiving fluids and nutrition through an IV.  Surgery.  When your condition is under control, your health care provider may recommend that you have a colonoscopy. This is an exam to look at the entire large intestine. During the exam, a lubricated, bendable tube is inserted into the anus and then passed into the rectum, colon, and other parts of the large intestine. A colonoscopy can show how severe your diverticula are and whether something else may be causing your symptoms.    Follow these instructions at home:  Medicines    Take over-the-counter and prescription medicines only as told by your health care provider. These include fiber supplements, probiotics, and stool softeners.  If you were prescribed an antibiotic medicine, take it as told by your health care provider. Do not stop taking the antibiotic even if you start to feel better.  Ask your health care provider if the medicine prescribed to you requires you to avoid driving or using machinery.  Eating and drinking      Follow a full liquid diet or another diet as directed by your health care provider.  After your symptoms improve, your health care provider may tell you to change your diet. He or she may recommend that you eat a diet that contains at least 25 grams (25 g) of fiber daily. Fiber makes it easier to pass stool. Healthy sources of fiber include:  Berries. One cup contains 4–8 grams of fiber.  Beans or lentils. One-half cup contains 5–8 grams of fiber.  Green vegetables. One cup contains 4 grams of fiber.  Avoid eating red meat.  General instructions    Do not use any products that contain nicotine or tobacco, such as cigarettes, e-cigarettes, and chewing tobacco. If you need help quitting, ask your health care provider.  Exercise for at least 30 minutes, 3 times each week. You should exercise hard enough to raise your heart rate and break a sweat.  Keep all follow-up visits as told by your health care provider. This is important. You may need to have a colonoscopy.  Contact a health care provider if:  Your pain does not improve.  Your bowel movements do not return to normal.  Get help right away if:  Your pain gets worse.  Your symptoms do not get better with treatment.  Your symptoms suddenly get worse.  You have a fever.  You vomit more than one time.  You have stools that are bloody, black, or tarry.  Summary  Diverticulitis is infection or inflammation of small pouches (diverticula) in the colon that form due to a condition called diverticulosis. Diverticula can trap stool (feces) and bacteria, causing infection and inflammation.  You are at higher risk for this condition if you have diverticulosis and you eat a diet that does not include enough fiber.  Most cases of this condition are mild and can be treated at home. More severe cases may need to be treated at a hospital.  When your condition is under control, your health care provider may recommend that you have an exam called a colonoscopy. This exam can show how severe your diverticula are and whether something else may be causing your symptoms.  Keep all follow-up visits as told by your health care provider. This is important.  This information is not intended to replace advice given to you by your health care provider. Make sure you discuss any questions you have with your health care provider.    Document Revised: 09/28/2020 Document Reviewed: 09/28/2020

## 2023-06-01 NOTE — ED ADULT NURSE NOTE - NSFALLUNIVINTERV_ED_ALL_ED
Bed/Stretcher in lowest position, wheels locked, appropriate side rails in place/Call bell, personal items and telephone in reach/Instruct patient to call for assistance before getting out of bed/chair/stretcher/Non-slip footwear applied when patient is off stretcher/[place] to call system/Physically safe environment - no spills, clutter or unnecessary equipment/Purposeful proactive rounding/Room/bathroom lighting operational, light cord in reach

## 2023-06-01 NOTE — ED STATDOCS - CLINICAL SUMMARY MEDICAL DECISION MAKING FREE TEXT BOX
Suspect diverticulitis w/ h/o similar. No vomiting to suggest SBO. No RLQ pain to suggest appy. No urinary complaints to suggest stone. Plan: labs, CT. Denies need for pain medicine.

## 2023-06-01 NOTE — ED ADULT NURSE NOTE - HOW OFTEN DO YOU HAVE A DRINK CONTAINING ALCOHOL?
Samples of Livalo 4 mg let at the front at the [facility] by Reji yepez.
Samples of livalo 4 mg from the [facility]
Never

## 2023-06-01 NOTE — ED ADULT NURSE NOTE - OBJECTIVE STATEMENT
presents to ed with abdominal pain beginning yesterday, c/o right lower quadrant pain, slight nausea. denies vomiting, denies diarrhea and fevers presents to ed with abdominal pain beginning yesterday, c/o left lower quadrant pain, c/o diarrhea. denies nausea vomiting and fevers

## 2023-06-01 NOTE — ED STATDOCS - PROGRESS NOTE DETAILS
76 y/o Female presents to ED c/o having pain similar to diverticulitis in the past for the last few days.  Became more intense this morning.  Associated with constipation.  Last episode of Diverticulitis was last year.  Had a small abscess with prior episode.  GI is Dr. Chang.  Neg nausea, vomiting.  Fever to 100 this morning.  On exam, pt with LLQ tenderness to palp.  Will f/u Labs, CT.  Pt does not want pain meds currently.  Neela Iqbal PA-C WBC elevated to 11.6.  Lipase 274, Lactate 1.4.  CT scan with evidence of diverticulitis.  Diverticulitis proximal sigmoid colon. No extraluminal gas or drainable fluid collection.  Will start PO cipro and flagyl.  Send to [facility].  Discussed clear liquid diet for 2 days.  F?U with Dr. Chang.  Neela Iqbal PA-C

## 2023-06-01 NOTE — ED STATDOCS - PATIENT PORTAL LINK FT
You can access the FollowMyHealth Patient Portal offered by [facility] by registering at the following website: [email or web address]. By joining [employer]’s FollowMyHealth portal, you will also be able to view your health information using other applications (apps) compatible with our system.

## 2023-06-12 LAB
CHOLEST SERPL-MCNC: 125 MG/DL
ESTIMATED AVERAGE GLUCOSE: 114 MG/DL
HBA1C MFR BLD HPLC: 5.6 %
HDLC SERPL-MCNC: 58 MG/DL
LDLC SERPL CALC-MCNC: 50 MG/DL
NONHDLC SERPL-MCNC: 66 MG/DL
TRIGL SERPL-MCNC: 81 MG/DL

## 2023-06-13 ENCOUNTER — APPOINTMENT (OUTPATIENT)
Dept: INTERNAL MEDICINE | Facility: CLINIC | Age: 78
End: 2023-06-13
Payer: MEDICARE

## 2023-06-13 VITALS
HEIGHT: 64 IN | HEART RATE: 86 BPM | BODY MASS INDEX: 29.71 KG/M2 | WEIGHT: 174 LBS | TEMPERATURE: 98.2 F | OXYGEN SATURATION: 98 % | DIASTOLIC BLOOD PRESSURE: 86 MMHG | SYSTOLIC BLOOD PRESSURE: 118 MMHG

## 2023-06-13 DIAGNOSIS — K57.32 DIVERTICULITIS OF LARGE INTESTINE W/OUT PERFORATION OR ABSCESS W/OUT BLEEDING: ICD-10-CM

## 2023-06-13 DIAGNOSIS — F41.9 ANXIETY DISORDER, UNSPECIFIED: ICD-10-CM

## 2023-06-13 PROCEDURE — 99214 OFFICE O/P EST MOD 30 MIN: CPT

## 2023-06-13 NOTE — ASSESSMENT
[FreeTextEntry1] : 1. AS s/p TAVR: she will follow up with  tomorrow and d/w him about coming off Plavix as it will be one year in 8/23 since surgery. She is now off asa. \par \par 2.Prediabetes: hba1c much better, Pt advised to keep diabetic diet, decrease carbs and increase dietary protein intake.\par Exercise as tolerated 3-4 times a week.\par \par 3. HLD: doing well on Crestor 5 mg, Patient advised on low cholesterol diet-decrease in white carbs and exercise 150 minutes per week.\par \par 4.Lumbar spondylolisthesis: s/p 2 epidural injection, f/u with  with radiofrequency ablation. \par \par 5.Anxiety: c/w sertraline 50 mg once daily. Counseling provided to patient. \par \par 6.Hypothyroidism: continue on levothyroxine 75 mcg once daily. full bw to be done at next visit. \par

## 2023-06-13 NOTE — HISTORY OF PRESENT ILLNESS
[FreeTextEntry1] : FU [de-identified] : FATUMA BRISCOE is a 77 year F who comes in for a follow up visit.\par Pt with hx of AS s/p TAVR, anxiety disorder, HLD, Hypothyroidism, Osteopenia, Lumbar spondylolisthesis s/p 2 CUATE steroid.\par Pt had lower left abdomen pain for about 3 weeks and worsened and went to  ED on 6/1/23 and ct abdomen which showed proximal sigmoid diverticulitis. She was given Cipro and Flagyl for 7 days and feels much better. She will see  next thurs. \par In [place] she saw  and had radiofrequency ablation of lumbar spine x 3 series, last one in April. She has noticed improvement in her symptoms especially when not doing extraneous activity. She had b/l cortisone injections of knees with mild relief of symptoms. She also has hx of gel injections which did not give much relief. \par She had bw done recently which we reviewed today. \par Patient denies any cp, sob,abdominal pain, nausea, vomiting, palpitations, fever, chills, constipation, diarrhea.\par

## 2023-06-14 ENCOUNTER — TRANSCRIPTION ENCOUNTER (OUTPATIENT)
Age: 78
End: 2023-06-14

## 2023-06-16 ENCOUNTER — TRANSCRIPTION ENCOUNTER (OUTPATIENT)
Age: 78
End: 2023-06-16

## 2023-06-19 ENCOUNTER — TRANSCRIPTION ENCOUNTER (OUTPATIENT)
Age: 78
End: 2023-06-19

## 2023-06-29 ENCOUNTER — APPOINTMENT (OUTPATIENT)
Dept: CT IMAGING | Facility: CLINIC | Age: 78
End: 2023-06-29
Payer: MEDICARE

## 2023-06-29 ENCOUNTER — OUTPATIENT (OUTPATIENT)
Dept: OUTPATIENT SERVICES | Facility: HOSPITAL | Age: 78
LOS: 1 days | End: 2023-06-29
Payer: MEDICARE

## 2023-06-29 DIAGNOSIS — Z98.890 OTHER SPECIFIED POSTPROCEDURAL STATES: Chronic | ICD-10-CM

## 2023-06-29 DIAGNOSIS — Z98.891 HISTORY OF UTERINE SCAR FROM PREVIOUS SURGERY: Chronic | ICD-10-CM

## 2023-06-29 DIAGNOSIS — Z00.8 ENCOUNTER FOR OTHER GENERAL EXAMINATION: ICD-10-CM

## 2023-06-29 DIAGNOSIS — Z90.710 ACQUIRED ABSENCE OF BOTH CERVIX AND UTERUS: Chronic | ICD-10-CM

## 2023-06-29 DIAGNOSIS — H26.9 UNSPECIFIED CATARACT: Chronic | ICD-10-CM

## 2023-06-29 PROCEDURE — 74177 CT ABD & PELVIS W/CONTRAST: CPT | Mod: 26,MB

## 2023-06-29 PROCEDURE — 74177 CT ABD & PELVIS W/CONTRAST: CPT | Mod: MB

## 2023-07-03 ENCOUNTER — EMERGENCY (EMERGENCY)
Facility: HOSPITAL | Age: 78
LOS: 0 days | Discharge: HOME | End: 2023-07-04
Attending: FAMILY MEDICINE
Payer: MEDICARE

## 2023-07-03 ENCOUNTER — APPOINTMENT (OUTPATIENT)
Dept: OBGYN | Facility: CLINIC | Age: 78
End: 2023-07-03
Payer: MEDICARE

## 2023-07-03 VITALS
DIASTOLIC BLOOD PRESSURE: 75 MMHG | SYSTOLIC BLOOD PRESSURE: 125 MMHG | RESPIRATION RATE: 20 BRPM | WEIGHT: 174 LBS | HEIGHT: 64 IN | BODY MASS INDEX: 29.71 KG/M2 | HEART RATE: 90 BPM

## 2023-07-03 VITALS — WEIGHT: 173.94 LBS | HEIGHT: 64 IN

## 2023-07-03 DIAGNOSIS — H26.9 UNSPECIFIED CATARACT: ICD-10-CM

## 2023-07-03 DIAGNOSIS — I35.0 NONRHEUMATIC AORTIC (VALVE) STENOSIS: ICD-10-CM

## 2023-07-03 DIAGNOSIS — Z98.891 HISTORY OF UTERINE SCAR FROM PREVIOUS SURGERY: Chronic | ICD-10-CM

## 2023-07-03 DIAGNOSIS — Z98.890 OTHER SPECIFIED POSTPROCEDURAL STATES: Chronic | ICD-10-CM

## 2023-07-03 DIAGNOSIS — Z90.710 ACQUIRED ABSENCE OF BOTH CERVIX AND UTERUS: ICD-10-CM

## 2023-07-03 DIAGNOSIS — E78.5 HYPERLIPIDEMIA, UNSPECIFIED: ICD-10-CM

## 2023-07-03 DIAGNOSIS — Z79.82 LONG TERM (CURRENT) USE OF ASPIRIN: ICD-10-CM

## 2023-07-03 DIAGNOSIS — Z88.0 ALLERGY STATUS TO PENICILLIN: ICD-10-CM

## 2023-07-03 DIAGNOSIS — H26.9 UNSPECIFIED CATARACT: Chronic | ICD-10-CM

## 2023-07-03 DIAGNOSIS — Z98.890 OTHER SPECIFIED POSTPROCEDURAL STATES: ICD-10-CM

## 2023-07-03 DIAGNOSIS — Z79.02 LONG TERM (CURRENT) USE OF ANTITHROMBOTICS/ANTIPLATELETS: ICD-10-CM

## 2023-07-03 DIAGNOSIS — E03.9 HYPOTHYROIDISM, UNSPECIFIED: ICD-10-CM

## 2023-07-03 DIAGNOSIS — Z91.048 OTHER NONMEDICINAL SUBSTANCE ALLERGY STATUS: ICD-10-CM

## 2023-07-03 DIAGNOSIS — R10.2 PELVIC AND PERINEAL PAIN: ICD-10-CM

## 2023-07-03 DIAGNOSIS — Z90.710 ACQUIRED ABSENCE OF BOTH CERVIX AND UTERUS: Chronic | ICD-10-CM

## 2023-07-03 DIAGNOSIS — F41.9 ANXIETY DISORDER, UNSPECIFIED: ICD-10-CM

## 2023-07-03 DIAGNOSIS — K57.92 DIVERTICULITIS OF INTESTINE, PART UNSPECIFIED, WITHOUT PERFORATION OR ABSCESS WITHOUT BLEEDING: ICD-10-CM

## 2023-07-03 DIAGNOSIS — R10.30 LOWER ABDOMINAL PAIN, UNSPECIFIED: ICD-10-CM

## 2023-07-03 LAB
ALBUMIN SERPL ELPH-MCNC: 4.1 G/DL — SIGNIFICANT CHANGE UP (ref 3.3–5)
ALP SERPL-CCNC: 63 U/L — SIGNIFICANT CHANGE UP (ref 40–120)
ALT FLD-CCNC: 24 U/L — SIGNIFICANT CHANGE UP (ref 12–78)
ANION GAP SERPL CALC-SCNC: 3 MMOL/L — LOW (ref 5–17)
APPEARANCE UR: CLEAR — SIGNIFICANT CHANGE UP
APTT BLD: 28.5 SEC — SIGNIFICANT CHANGE UP (ref 27.5–35.5)
AST SERPL-CCNC: 18 U/L — SIGNIFICANT CHANGE UP (ref 15–37)
BACTERIA # UR AUTO: ABNORMAL
BASOPHILS # BLD AUTO: 0.04 K/UL — SIGNIFICANT CHANGE UP (ref 0–0.2)
BASOPHILS NFR BLD AUTO: 0.3 % — SIGNIFICANT CHANGE UP (ref 0–2)
BILIRUB SERPL-MCNC: 0.4 MG/DL — SIGNIFICANT CHANGE UP (ref 0.2–1.2)
BILIRUB UR QL STRIP: NORMAL
BILIRUB UR-MCNC: NEGATIVE — SIGNIFICANT CHANGE UP
BLD GP AB SCN SERPL QL: SIGNIFICANT CHANGE UP
BUN SERPL-MCNC: 15 MG/DL — SIGNIFICANT CHANGE UP (ref 7–23)
CALCIUM SERPL-MCNC: 9.3 MG/DL — SIGNIFICANT CHANGE UP (ref 8.5–10.1)
CHLORIDE SERPL-SCNC: 111 MMOL/L — HIGH (ref 96–108)
CO2 SERPL-SCNC: 26 MMOL/L — SIGNIFICANT CHANGE UP (ref 22–31)
COLOR SPEC: YELLOW — SIGNIFICANT CHANGE UP
CREAT SERPL-MCNC: 0.89 MG/DL — SIGNIFICANT CHANGE UP (ref 0.5–1.3)
DIFF PNL FLD: ABNORMAL
EGFR: 66 ML/MIN/1.73M2 — SIGNIFICANT CHANGE UP
EOSINOPHIL # BLD AUTO: 0.04 K/UL — SIGNIFICANT CHANGE UP (ref 0–0.5)
EOSINOPHIL NFR BLD AUTO: 0.3 % — SIGNIFICANT CHANGE UP (ref 0–6)
EPI CELLS # UR: SIGNIFICANT CHANGE UP
GLUCOSE SERPL-MCNC: 110 MG/DL — HIGH (ref 70–99)
GLUCOSE UR QL: NEGATIVE — SIGNIFICANT CHANGE UP
GLUCOSE UR-MCNC: NORMAL
HCG UR QL: 0.2 EU/DL
HCT VFR BLD CALC: 38.8 % — SIGNIFICANT CHANGE UP (ref 34.5–45)
HGB BLD-MCNC: 13 G/DL — SIGNIFICANT CHANGE UP (ref 11.5–15.5)
HGB UR QL STRIP.AUTO: NORMAL
IMM GRANULOCYTES NFR BLD AUTO: 0.4 % — SIGNIFICANT CHANGE UP (ref 0–0.9)
INR BLD: 1.21 RATIO — HIGH (ref 0.88–1.16)
KETONES UR-MCNC: NEGATIVE — SIGNIFICANT CHANGE UP
KETONES UR-MCNC: NORMAL
LACTATE SERPL-SCNC: 1.6 MMOL/L — SIGNIFICANT CHANGE UP (ref 0.7–2)
LEUKOCYTE ESTERASE UR QL STRIP: NORMAL
LEUKOCYTE ESTERASE UR-ACNC: ABNORMAL
LIDOCAIN IGE QN: 201 U/L — SIGNIFICANT CHANGE UP (ref 73–393)
LYMPHOCYTES # BLD AUTO: 17.2 % — SIGNIFICANT CHANGE UP (ref 13–44)
LYMPHOCYTES # BLD AUTO: 2.26 K/UL — SIGNIFICANT CHANGE UP (ref 1–3.3)
MCHC RBC-ENTMCNC: 31.7 PG — SIGNIFICANT CHANGE UP (ref 27–34)
MCHC RBC-ENTMCNC: 33.5 GM/DL — SIGNIFICANT CHANGE UP (ref 32–36)
MCV RBC AUTO: 94.6 FL — SIGNIFICANT CHANGE UP (ref 80–100)
MONOCYTES # BLD AUTO: 0.86 K/UL — SIGNIFICANT CHANGE UP (ref 0–0.9)
MONOCYTES NFR BLD AUTO: 6.5 % — SIGNIFICANT CHANGE UP (ref 2–14)
NEUTROPHILS # BLD AUTO: 9.92 K/UL — HIGH (ref 1.8–7.4)
NEUTROPHILS NFR BLD AUTO: 75.3 % — SIGNIFICANT CHANGE UP (ref 43–77)
NITRITE UR QL STRIP: NORMAL
NITRITE UR-MCNC: NEGATIVE — SIGNIFICANT CHANGE UP
PH UR STRIP: 5.5
PH UR: 5 — SIGNIFICANT CHANGE UP (ref 5–8)
PLATELET # BLD AUTO: 293 K/UL — SIGNIFICANT CHANGE UP (ref 150–400)
POTASSIUM SERPL-MCNC: 4 MMOL/L — SIGNIFICANT CHANGE UP (ref 3.5–5.3)
POTASSIUM SERPL-SCNC: 4 MMOL/L — SIGNIFICANT CHANGE UP (ref 3.5–5.3)
PROT SERPL-MCNC: 8.1 GM/DL — SIGNIFICANT CHANGE UP (ref 6–8.3)
PROT UR STRIP-MCNC: NORMAL
PROT UR-MCNC: 15
PROTHROM AB SERPL-ACNC: 14.1 SEC — HIGH (ref 10.5–13.4)
RBC # BLD: 4.1 M/UL — SIGNIFICANT CHANGE UP (ref 3.8–5.2)
RBC # FLD: 12.8 % — SIGNIFICANT CHANGE UP (ref 10.3–14.5)
RBC CASTS # UR COMP ASSIST: SIGNIFICANT CHANGE UP /HPF (ref 0–4)
SODIUM SERPL-SCNC: 140 MMOL/L — SIGNIFICANT CHANGE UP (ref 135–145)
SP GR SPEC: 1.02 — SIGNIFICANT CHANGE UP (ref 1.01–1.02)
SP GR UR STRIP: 1.01
UROBILINOGEN FLD QL: NEGATIVE — SIGNIFICANT CHANGE UP
WBC # BLD: 13.17 K/UL — HIGH (ref 3.8–10.5)
WBC # FLD AUTO: 13.17 K/UL — HIGH (ref 3.8–10.5)
WBC UR QL: SIGNIFICANT CHANGE UP /HPF (ref 0–5)

## 2023-07-03 PROCEDURE — 86900 BLOOD TYPING SEROLOGIC ABO: CPT

## 2023-07-03 PROCEDURE — 80053 COMPREHEN METABOLIC PANEL: CPT

## 2023-07-03 PROCEDURE — 74177 CT ABD & PELVIS W/CONTRAST: CPT | Mod: 26,MA

## 2023-07-03 PROCEDURE — 83605 ASSAY OF LACTIC ACID: CPT

## 2023-07-03 PROCEDURE — 85025 COMPLETE CBC W/AUTO DIFF WBC: CPT

## 2023-07-03 PROCEDURE — 81003 URINALYSIS AUTO W/O SCOPE: CPT | Mod: QW

## 2023-07-03 PROCEDURE — 83690 ASSAY OF LIPASE: CPT

## 2023-07-03 PROCEDURE — 81001 URINALYSIS AUTO W/SCOPE: CPT

## 2023-07-03 PROCEDURE — 99213 OFFICE O/P EST LOW 20 MIN: CPT | Mod: 25

## 2023-07-03 PROCEDURE — 85610 PROTHROMBIN TIME: CPT

## 2023-07-03 PROCEDURE — 99285 EMERGENCY DEPT VISIT HI MDM: CPT

## 2023-07-03 PROCEDURE — 85730 THROMBOPLASTIN TIME PARTIAL: CPT

## 2023-07-03 PROCEDURE — 99284 EMERGENCY DEPT VISIT MOD MDM: CPT | Mod: 25

## 2023-07-03 PROCEDURE — 74177 CT ABD & PELVIS W/CONTRAST: CPT | Mod: MA

## 2023-07-03 PROCEDURE — 36415 COLL VENOUS BLD VENIPUNCTURE: CPT

## 2023-07-03 PROCEDURE — 86850 RBC ANTIBODY SCREEN: CPT

## 2023-07-03 PROCEDURE — 86901 BLOOD TYPING SEROLOGIC RH(D): CPT

## 2023-07-03 RX ORDER — SODIUM CHLORIDE 9 MG/ML
1000 INJECTION INTRAMUSCULAR; INTRAVENOUS; SUBCUTANEOUS ONCE
Refills: 0 | Status: COMPLETED | OUTPATIENT
Start: 2023-07-03 | End: 2023-07-03

## 2023-07-03 RX ADMIN — SODIUM CHLORIDE 1000 MILLILITER(S): 9 INJECTION INTRAMUSCULAR; INTRAVENOUS; SUBCUTANEOUS at 22:34

## 2023-07-03 NOTE — ED STATDOCS - NSFOLLOWUPINSTRUCTIONS_ED_ALL_ED_FT
Continue your antibiotics. Follow up with your gastroenterologist. maintain low fiber diet while you are being treated for your diverticulitis. Return to ER if worse.

## 2023-07-03 NOTE — ED ADULT TRIAGE NOTE - HEIGHT IN INCHES
Based on patient's symptoms, exam, diagnostic testing and records the following are my findings and recommendations. 4

## 2023-07-03 NOTE — ED STATDOCS - PATIENT PORTAL LINK FT
You can access the FollowMyHealth Patient Portal offered by [facility] by registering at the following website: [email or web address]. By joining [employer]’s FollowMyHealth portal, you will also be able to view your health information using other applications (apps) compatible with our system.

## 2023-07-03 NOTE — ED STATDOCS - OBJECTIVE STATEMENT
77 yea radhad female with PMHx of diverticulitis, HLD, hypothyroidism, aortic stenosis, TAVR presents to the ED c/o lower abdominal pain for the past few days. Patient was in severe pain earlier today worse than she's felt in the past few days but states pain has decreased at this time. States she had workup for pain and was given levaquin and flagyl with no relief since 6/29 for x10 days. Pt called GI Dr. Chang and was sent to the ED for further eval. Denies nausea, vomiting, fever. Reports multiple BM today. Patient has hx of abdominal abscess and states pain feels similar.

## 2023-07-03 NOTE — ED ADULT TRIAGE NOTE - CHIEF COMPLAINT QUOTE
pt presents to ED with complaints of lower abdominal pain. pt endorses being on abx (levaquin and flagyl) since thursday. pt endorses she was diagnosed with diverticulitis out pt.

## 2023-07-04 VITALS
DIASTOLIC BLOOD PRESSURE: 71 MMHG | HEART RATE: 90 BPM | TEMPERATURE: 98 F | OXYGEN SATURATION: 100 % | RESPIRATION RATE: 18 BRPM | SYSTOLIC BLOOD PRESSURE: 127 MMHG

## 2023-07-04 NOTE — ED PROVIDER NOTE - PATIENT PORTAL LINK FT
You can access the FollowMyHealth Patient Portal offered by [facility] by registering at the following website: [email or web address]. By joining [employer]’s FollowMyHealth portal, you will also be able to view your health information using other applications (apps) compatible with our system.

## 2023-07-04 NOTE — ED PROVIDER NOTE - NSFOLLOWUPINSTRUCTIONS_ED_ALL_ED_FT
Comntinue your antibiotics. Maintain a low fiber diet while you are undergoing treatment for your diverticulitis. Follow up with your gastroenterologist.

## 2023-07-05 NOTE — HISTORY OF PRESENT ILLNESS
[FreeTextEntry1] : Pt c/o LLQ pain. SHe was dx with diverticulits june 1 sigmaid colon\par 1 week ago had pain again. started on levaquin and flagyl had repeat ct scan improved, when goes to have BM has a lot of gas\par urinary symptoms: none\par Hx of hysterectomy and USO unsure which side\par both ct scans reviewed reproductive organs unremarkable no specifics about ovaries.

## 2023-07-05 NOTE — PHYSICAL EXAM
[Appropriately responsive] : appropriately responsive [Alert] : alert [No Acute Distress] : no acute distress [Vulvar Atrophy] : vulvar atrophy [Labia Majora] : normal [Labia Minora] : normal [Normal] : normal [Atrophy] : atrophy [Absent] : absent [Uterine Adnexae] : non-palpable

## 2023-07-12 ENCOUNTER — APPOINTMENT (OUTPATIENT)
Dept: ULTRASOUND IMAGING | Facility: CLINIC | Age: 78
End: 2023-07-12
Payer: MEDICARE

## 2023-07-12 ENCOUNTER — OUTPATIENT (OUTPATIENT)
Dept: OUTPATIENT SERVICES | Facility: HOSPITAL | Age: 78
LOS: 1 days | End: 2023-07-12
Payer: MEDICARE

## 2023-07-12 DIAGNOSIS — Z98.890 OTHER SPECIFIED POSTPROCEDURAL STATES: Chronic | ICD-10-CM

## 2023-07-12 DIAGNOSIS — R10.2 PELVIC AND PERINEAL PAIN: ICD-10-CM

## 2023-07-12 DIAGNOSIS — H26.9 UNSPECIFIED CATARACT: Chronic | ICD-10-CM

## 2023-07-12 DIAGNOSIS — Z90.710 ACQUIRED ABSENCE OF BOTH CERVIX AND UTERUS: Chronic | ICD-10-CM

## 2023-07-12 PROCEDURE — 76830 TRANSVAGINAL US NON-OB: CPT | Mod: 26

## 2023-07-12 PROCEDURE — 76830 TRANSVAGINAL US NON-OB: CPT

## 2023-07-13 ENCOUNTER — TRANSCRIPTION ENCOUNTER (OUTPATIENT)
Age: 78
End: 2023-07-13

## 2023-07-14 ENCOUNTER — TRANSCRIPTION ENCOUNTER (OUTPATIENT)
Age: 78
End: 2023-07-14

## 2023-07-14 NOTE — HEALTH RISK ASSESSMENT
[Yes] : Yes [Retired] : retired [Carbon Monoxide Detector] : carbon monoxide detector [Smoke Detector] : smoke detector [Safety elements used in home] : safety elements used in home [Sunscreen] : uses sunscreen [Seat Belt] :  uses seat belt [] : No [Patient reported mammogram was normal] : Patient reported mammogram was normal [de-identified] : former [YearQuit] : 1996 [Patient reported bone density results were normal] : Patient reported bone density results were normal [Patient reported colonoscopy was normal] : Patient reported colonoscopy was normal [] :  [Fully functional (bathing, dressing, toileting, transferring, walking, feeding)] : Fully functional (bathing, dressing, toileting, transferring, walking, feeding) [Guns at Home] : no guns at home [Fully functional (using the telephone, shopping, preparing meals, housekeeping, doing laundry, using] : Fully functional and needs no help or supervision to perform IADLs (using the telephone, shopping, preparing meals, housekeeping, doing laundry, using transportation, managing medications and managing finances) [BoneDensityDate] : 01/19 [MammogramDate] : 01/19 [ColonoscopyDate] : 01/18 independent

## 2023-07-17 ENCOUNTER — APPOINTMENT (OUTPATIENT)
Dept: OBGYN | Facility: CLINIC | Age: 78
End: 2023-07-17
Payer: MEDICARE

## 2023-07-17 VITALS
SYSTOLIC BLOOD PRESSURE: 112 MMHG | BODY MASS INDEX: 29.37 KG/M2 | HEIGHT: 64 IN | WEIGHT: 172 LBS | DIASTOLIC BLOOD PRESSURE: 70 MMHG

## 2023-07-17 DIAGNOSIS — Z01.419 ENCOUNTER FOR GYNECOLOGICAL EXAMINATION (GENERAL) (ROUTINE) W/OUT ABNORMAL FINDINGS: ICD-10-CM

## 2023-07-17 PROCEDURE — G0101: CPT

## 2023-07-17 NOTE — HISTORY OF PRESENT ILLNESS
[Previously active] : previously active [TextBox_4] : Pt had total hysterectomy and USO at 39 for bleeding\par Hx of LS dx several yrs ago, uses clobetasol about once/week. Doesnt need rx today\par takes vit d, exercises\par BMD last yr showed osteopenia with elevated frax (hx of wrist fx), on reclast every 2 yrs from rheum.\par Seen recently for pelvic pain, was examined and sent for pelvic sono, declines pelvic exam today. [Mammogramdate] : 9/2022 [PapSmeardate] : 2021 [BoneDensityDate] : 2022 [ColonoscopyDate] : 5 yrs ago [TextBox_43] : due but wants to do cologuard [FreeTextEntry2] :

## 2023-08-21 ENCOUNTER — TRANSCRIPTION ENCOUNTER (OUTPATIENT)
Age: 78
End: 2023-08-21

## 2023-08-22 ENCOUNTER — TRANSCRIPTION ENCOUNTER (OUTPATIENT)
Age: 78
End: 2023-08-22

## 2023-08-23 ENCOUNTER — TRANSCRIPTION ENCOUNTER (OUTPATIENT)
Age: 78
End: 2023-08-23

## 2023-09-07 ENCOUNTER — APPOINTMENT (OUTPATIENT)
Dept: MAMMOGRAPHY | Facility: CLINIC | Age: 78
End: 2023-09-07
Payer: MEDICARE

## 2023-09-07 ENCOUNTER — OUTPATIENT (OUTPATIENT)
Dept: OUTPATIENT SERVICES | Facility: HOSPITAL | Age: 78
LOS: 1 days | End: 2023-09-07
Payer: MEDICARE

## 2023-09-07 ENCOUNTER — RESULT REVIEW (OUTPATIENT)
Age: 78
End: 2023-09-07

## 2023-09-07 DIAGNOSIS — Z98.890 OTHER SPECIFIED POSTPROCEDURAL STATES: Chronic | ICD-10-CM

## 2023-09-07 DIAGNOSIS — H26.9 UNSPECIFIED CATARACT: Chronic | ICD-10-CM

## 2023-09-07 DIAGNOSIS — Z90.710 ACQUIRED ABSENCE OF BOTH CERVIX AND UTERUS: Chronic | ICD-10-CM

## 2023-09-07 DIAGNOSIS — Z12.39 ENCOUNTER FOR OTHER SCREENING FOR MALIGNANT NEOPLASM OF BREAST: ICD-10-CM

## 2023-09-07 DIAGNOSIS — Z98.891 HISTORY OF UTERINE SCAR FROM PREVIOUS SURGERY: Chronic | ICD-10-CM

## 2023-09-07 PROCEDURE — 77063 BREAST TOMOSYNTHESIS BI: CPT | Mod: 26

## 2023-09-07 PROCEDURE — 77067 SCR MAMMO BI INCL CAD: CPT

## 2023-09-07 PROCEDURE — 77067 SCR MAMMO BI INCL CAD: CPT | Mod: 26

## 2023-09-07 PROCEDURE — 77063 BREAST TOMOSYNTHESIS BI: CPT

## 2023-09-30 NOTE — PATIENT PROFILE ADULT - DATE OF FIRST COVID-19 BOOSTER
15-Oct-2021 Double Z Plasty Text: The lesion was extirpated to the level of the fat with a #15 scalpel blade. Given the location of the defect, shape of the defect and the proximity to free margins a double Z-plasty was deemed most appropriate for repair. Using a sterile surgical marker, the appropriate transposition arms of the double Z-plasty were drawn incorporating the defect and placing the expected incisions within the relaxed skin tension lines where possible. The area thus outlined was incised deep to adipose tissue with a #15 scalpel blade. The skin margins were undermined to an appropriate distance in all directions utilizing iris scissors. The opposing transposition arms were then transposed and carried over into place in opposite direction and anchored with interrupted buried subcutaneous sutures.

## 2023-11-17 ENCOUNTER — NON-APPOINTMENT (OUTPATIENT)
Age: 78
End: 2023-11-17

## 2023-11-29 ENCOUNTER — APPOINTMENT (OUTPATIENT)
Dept: INTERNAL MEDICINE | Facility: CLINIC | Age: 78
End: 2023-11-29
Payer: MEDICARE

## 2023-11-29 VITALS
HEIGHT: 64 IN | TEMPERATURE: 97.9 F | BODY MASS INDEX: 29.88 KG/M2 | WEIGHT: 175 LBS | SYSTOLIC BLOOD PRESSURE: 158 MMHG | DIASTOLIC BLOOD PRESSURE: 80 MMHG | HEART RATE: 86 BPM | OXYGEN SATURATION: 98 %

## 2023-11-29 VITALS — DIASTOLIC BLOOD PRESSURE: 78 MMHG | SYSTOLIC BLOOD PRESSURE: 138 MMHG

## 2023-11-29 DIAGNOSIS — M43.16 SPONDYLOLISTHESIS, LUMBAR REGION: ICD-10-CM

## 2023-11-29 DIAGNOSIS — M54.16 RADICULOPATHY, LUMBAR REGION: ICD-10-CM

## 2023-11-29 DIAGNOSIS — Z95.2 PRESENCE OF PROSTHETIC HEART VALVE: ICD-10-CM

## 2023-11-29 LAB
25(OH)D3 SERPL-MCNC: 44.7 NG/ML
ALBUMIN SERPL ELPH-MCNC: 4.6 G/DL
ALP BLD-CCNC: 61 U/L
ALT SERPL-CCNC: 24 U/L
ANION GAP SERPL CALC-SCNC: 9 MMOL/L
AST SERPL-CCNC: 25 U/L
BILIRUB SERPL-MCNC: 0.4 MG/DL
BUN SERPL-MCNC: 17 MG/DL
CALCIUM SERPL-MCNC: 9.9 MG/DL
CHLORIDE SERPL-SCNC: 106 MMOL/L
CHOLEST SERPL-MCNC: 164 MG/DL
CO2 SERPL-SCNC: 26 MMOL/L
CREAT SERPL-MCNC: 0.79 MG/DL
EGFR: 76 ML/MIN/1.73M2
ESTIMATED AVERAGE GLUCOSE: 108 MG/DL
GLUCOSE SERPL-MCNC: 96 MG/DL
HBA1C MFR BLD HPLC: 5.4 %
HDLC SERPL-MCNC: 58 MG/DL
LDLC SERPL CALC-MCNC: 76 MG/DL
NONHDLC SERPL-MCNC: 107 MG/DL
POTASSIUM SERPL-SCNC: 4.4 MMOL/L
PROT SERPL-MCNC: 7.2 G/DL
SODIUM SERPL-SCNC: 141 MMOL/L
T4 FREE SERPL-MCNC: 1.2 NG/DL
TRIGL SERPL-MCNC: 184 MG/DL
TSH SERPL-ACNC: 0.71 UIU/ML

## 2023-11-29 PROCEDURE — 99214 OFFICE O/P EST MOD 30 MIN: CPT

## 2023-11-29 RX ORDER — CLOPIDOGREL BISULFATE 75 MG/1
75 TABLET, FILM COATED ORAL DAILY
Qty: 30 | Refills: 3 | Status: DISCONTINUED | COMMUNITY
Start: 2022-08-05 | End: 2023-11-29

## 2023-11-29 RX ORDER — METHYLPREDNISOLONE 4 MG/1
4 TABLET ORAL
Qty: 1 | Refills: 0 | Status: DISCONTINUED | COMMUNITY
Start: 2022-10-26 | End: 2023-11-29

## 2023-12-11 ENCOUNTER — TRANSCRIPTION ENCOUNTER (OUTPATIENT)
Age: 78
End: 2023-12-11

## 2023-12-11 RX ORDER — CLOBETASOL PROPIONATE 0.5 MG/G
0.05 CREAM TOPICAL
Qty: 1 | Refills: 2 | Status: ACTIVE | COMMUNITY
Start: 2018-09-04 | End: 1900-01-01

## 2023-12-21 ENCOUNTER — TRANSCRIPTION ENCOUNTER (OUTPATIENT)
Age: 78
End: 2023-12-21

## 2024-02-21 ENCOUNTER — TRANSCRIPTION ENCOUNTER (OUTPATIENT)
Age: 79
End: 2024-02-21

## 2024-02-21 ENCOUNTER — RX RENEWAL (OUTPATIENT)
Age: 79
End: 2024-02-21

## 2024-04-29 ENCOUNTER — LABORATORY RESULT (OUTPATIENT)
Age: 79
End: 2024-04-29

## 2024-05-02 ENCOUNTER — APPOINTMENT (OUTPATIENT)
Dept: DERMATOLOGY | Facility: CLINIC | Age: 79
End: 2024-05-02
Payer: MEDICARE

## 2024-05-02 VITALS — WEIGHT: 175 LBS | HEIGHT: 64 IN | BODY MASS INDEX: 29.88 KG/M2

## 2024-05-02 DIAGNOSIS — L82.1 OTHER SEBORRHEIC KERATOSIS: ICD-10-CM

## 2024-05-02 DIAGNOSIS — L81.4 OTHER MELANIN HYPERPIGMENTATION: ICD-10-CM

## 2024-05-02 PROCEDURE — 99213 OFFICE O/P EST LOW 20 MIN: CPT

## 2024-05-02 NOTE — ASSESSMENT
[FreeTextEntry1] : Complete skin examination is negative for malignancy; Multiple new concerns were addressed and discussed. Therapeutic options and their risks and benefits; along with multiple diagnostic possibilities were discussed at length; risks and benefits of skin biopsy and/or other further study were discussed;   Continue regular exams; Follow up for TBSE in 1 year

## 2024-05-02 NOTE — HISTORY OF PRESENT ILLNESS
[de-identified] : Pt. presents for skin check;\par  c/o few spots of concern;  \par  Severity:  mild  \par  Modifying factors:  none\par  Associated symptoms:  none\par  Context:  no association with activity \par

## 2024-05-06 ENCOUNTER — NON-APPOINTMENT (OUTPATIENT)
Age: 79
End: 2024-05-06

## 2024-05-06 LAB
ESTIMATED AVERAGE GLUCOSE: 114 MG/DL
HBA1C MFR BLD HPLC: 5.6 %

## 2024-05-08 ENCOUNTER — APPOINTMENT (OUTPATIENT)
Dept: INTERNAL MEDICINE | Facility: CLINIC | Age: 79
End: 2024-05-08
Payer: MEDICARE

## 2024-05-08 VITALS
DIASTOLIC BLOOD PRESSURE: 82 MMHG | TEMPERATURE: 99 F | WEIGHT: 175 LBS | OXYGEN SATURATION: 98 % | BODY MASS INDEX: 29.88 KG/M2 | HEART RATE: 83 BPM | HEIGHT: 64 IN | SYSTOLIC BLOOD PRESSURE: 140 MMHG

## 2024-05-08 DIAGNOSIS — I10 ESSENTIAL (PRIMARY) HYPERTENSION: ICD-10-CM

## 2024-05-08 DIAGNOSIS — E78.5 HYPERLIPIDEMIA, UNSPECIFIED: ICD-10-CM

## 2024-05-08 DIAGNOSIS — E03.9 HYPOTHYROIDISM, UNSPECIFIED: ICD-10-CM

## 2024-05-08 PROCEDURE — G2211 COMPLEX E/M VISIT ADD ON: CPT

## 2024-05-08 PROCEDURE — 99214 OFFICE O/P EST MOD 30 MIN: CPT

## 2024-05-08 RX ORDER — ROSUVASTATIN CALCIUM 10 MG/1
10 TABLET, FILM COATED ORAL
Qty: 90 | Refills: 1 | Status: ACTIVE | COMMUNITY
Start: 2018-08-20 | End: 1900-01-01

## 2024-05-08 RX ORDER — CELECOXIB 200 MG/1
200 CAPSULE ORAL DAILY
Qty: 30 | Refills: 0 | Status: ACTIVE | COMMUNITY
Start: 2022-09-14 | End: 1900-01-01

## 2024-05-08 RX ORDER — LEVOTHYROXINE SODIUM 0.07 MG/1
75 TABLET ORAL
Qty: 90 | Refills: 1 | Status: ACTIVE | COMMUNITY
Start: 2017-10-12 | End: 1900-01-01

## 2024-05-08 RX ORDER — SERTRALINE HYDROCHLORIDE 50 MG/1
50 TABLET, FILM COATED ORAL
Qty: 90 | Refills: 1 | Status: ACTIVE | COMMUNITY
Start: 2021-06-22 | End: 1900-01-01

## 2024-05-08 NOTE — ASSESSMENT
[FreeTextEntry1] : 1. AS s/p TAVR: she will follow up with  today, continue on ASA.   2.Prediabetes: hba1c stable, Pt advised to keep diabetic diet, decrease carbs and increase dietary protein intake. Exercise as tolerated 3-4 times a week.  3. HLD: ascvd risk score high at 28%, pt agreeable to increase Crestor to 10 mg daily, she will make diet changes, continue on Crestor 5 mg, Patient advised on low cholesterol diet-decrease in white carbs and exercise 150 minutes per week.  4.Lumbar spondylolisthesis: s/p 2 epidural injection, f/u with  with radiofrequency ablation. she will f/u with him in [place].  5.Anxiety: c/w sertraline 50 mg once daily. Counseling provided to patient.  6.Hypothyroidism: continue on levothyroxine 75 mcg once daily. TFTs normal.

## 2024-05-08 NOTE — HISTORY OF PRESENT ILLNESS
[FreeTextEntry1] :  Follow Up Visit [de-identified] : FATUMA BRISCOE is a 78 year F who comes in for a follow up visit. Pt with hx of AS s/p TAVR, anxiety disorder, HLD, Hypothyroidism, Osteopenia, Lumbar spondylolisthesis s/p 2 CUATE steroid. Pt had labs done which show higher LDL, TC due to non-compliance with diet in [place].  She will be seeing  today as well.  Patient denies any cp, sob, abdominal pain, nausea, vomiting, palpitations, fever, chills, constipation, diarrhea.

## 2024-06-07 ENCOUNTER — NON-APPOINTMENT (OUTPATIENT)
Age: 79
End: 2024-06-07

## 2024-07-18 ENCOUNTER — NON-APPOINTMENT (OUTPATIENT)
Age: 79
End: 2024-07-18

## 2024-07-23 ENCOUNTER — APPOINTMENT (OUTPATIENT)
Dept: OBGYN | Facility: CLINIC | Age: 79
End: 2024-07-23
Payer: MEDICARE

## 2024-07-23 VITALS
HEART RATE: 75 BPM | RESPIRATION RATE: 14 BRPM | HEIGHT: 64 IN | BODY MASS INDEX: 30.05 KG/M2 | DIASTOLIC BLOOD PRESSURE: 80 MMHG | WEIGHT: 176 LBS | SYSTOLIC BLOOD PRESSURE: 132 MMHG

## 2024-07-23 DIAGNOSIS — M85.80 OTHER SPECIFIED DISORDERS OF BONE DENSITY AND STRUCTURE, UNSPECIFIED SITE: ICD-10-CM

## 2024-07-23 DIAGNOSIS — L90.0 LICHEN SCLEROSUS ET ATROPHICUS: ICD-10-CM

## 2024-07-23 DIAGNOSIS — Z01.419 ENCOUNTER FOR GYNECOLOGICAL EXAMINATION (GENERAL) (ROUTINE) W/OUT ABNORMAL FINDINGS: ICD-10-CM

## 2024-07-23 LAB
CARD LOT #: NORMAL
CARD LOT EXP DATE: NORMAL
DATE COLLECTED: NORMAL
DATE COLLECTED: NORMAL
DEVELOPER LOT #: NORMAL
DEVELOPER LOT EXP DATE: NORMAL
HEMOCCULT 2: NEGATIVE
HEMOCCULT SP1 STL QL: NEGATIVE
QUALITY CONTROL: YES
QUALITY CONTROL: YES

## 2024-07-23 PROCEDURE — 99214 OFFICE O/P EST MOD 30 MIN: CPT

## 2024-07-23 NOTE — PHYSICAL EXAM
[Appropriately responsive] : appropriately responsive [Alert] : alert [No Acute Distress] : no acute distress [Soft] : soft [Non-tender] : non-tender [Examination Of The Breasts] : a normal appearance [No Masses] : no breast masses were palpable [Vulvar Atrophy] : vulvar atrophy [Labia Majora] : normal [Labia Minora] : normal [Normal] : normal [Atrophy] : atrophy [Absent] : absent [Uterine Adnexae] : non-palpable [FreeTextEntry4] : LS noted at perineum

## 2024-07-23 NOTE — HISTORY OF PRESENT ILLNESS
[Previously active] : previously active [TextBox_4] : Pt had total hysterectomy and USO at 39 for bleeding Hx of LS dx several yrs ago, uses clobetasol about 3x/week. takes vit d, exercises (walks) BMD last yr showed osteopenia with elevated frax (hx of wrist fx), on reclast every 2 yrs from rheum. declines spec exam today [Mammogramdate] : 9/2023 [PapSmeardate] : 2021 [BoneDensityDate] : 2022 [ColonoscopyDate] : 6 yrs ago [TextBox_43] : did shiva 2023 [FreeTextEntry2] :

## 2024-07-27 ENCOUNTER — NON-APPOINTMENT (OUTPATIENT)
Age: 79
End: 2024-07-27

## 2024-08-06 ENCOUNTER — RX RENEWAL (OUTPATIENT)
Age: 79
End: 2024-08-06

## 2024-08-15 ENCOUNTER — NON-APPOINTMENT (OUTPATIENT)
Age: 79
End: 2024-08-15

## 2024-08-25 ENCOUNTER — NON-APPOINTMENT (OUTPATIENT)
Age: 79
End: 2024-08-25

## 2024-09-12 ENCOUNTER — RESULT REVIEW (OUTPATIENT)
Age: 79
End: 2024-09-12

## 2024-09-12 ENCOUNTER — NON-APPOINTMENT (OUTPATIENT)
Age: 79
End: 2024-09-12

## 2024-09-12 ENCOUNTER — OUTPATIENT (OUTPATIENT)
Dept: OUTPATIENT SERVICES | Facility: HOSPITAL | Age: 79
LOS: 1 days | End: 2024-09-12
Payer: MEDICARE

## 2024-09-12 ENCOUNTER — APPOINTMENT (OUTPATIENT)
Dept: PEDIATRIC ALLERGY IMMUNOLOGY | Facility: CLINIC | Age: 79
End: 2024-09-12
Payer: MEDICARE

## 2024-09-12 ENCOUNTER — APPOINTMENT (OUTPATIENT)
Dept: MAMMOGRAPHY | Facility: CLINIC | Age: 79
End: 2024-09-12
Payer: MEDICARE

## 2024-09-12 ENCOUNTER — APPOINTMENT (OUTPATIENT)
Dept: RADIOLOGY | Facility: CLINIC | Age: 79
End: 2024-09-12
Payer: MEDICARE

## 2024-09-12 VITALS
DIASTOLIC BLOOD PRESSURE: 70 MMHG | TEMPERATURE: 97.2 F | SYSTOLIC BLOOD PRESSURE: 124 MMHG | BODY MASS INDEX: 30.39 KG/M2 | OXYGEN SATURATION: 97 % | HEART RATE: 100 BPM | HEIGHT: 64 IN | WEIGHT: 178 LBS

## 2024-09-12 DIAGNOSIS — Z98.890 OTHER SPECIFIED POSTPROCEDURAL STATES: Chronic | ICD-10-CM

## 2024-09-12 DIAGNOSIS — L23.5 ALLERGIC CONTACT DERMATITIS DUE TO OTHER CHEMICAL PRODUCTS: ICD-10-CM

## 2024-09-12 DIAGNOSIS — Z98.891 HISTORY OF UTERINE SCAR FROM PREVIOUS SURGERY: Chronic | ICD-10-CM

## 2024-09-12 DIAGNOSIS — J30.89 OTHER ALLERGIC RHINITIS: ICD-10-CM

## 2024-09-12 DIAGNOSIS — Z87.2 PERSONAL HISTORY OF DISEASES OF THE SKIN AND SUBCUTANEOUS TISSUE: ICD-10-CM

## 2024-09-12 DIAGNOSIS — J30.1 ALLERGIC RHINITIS DUE TO POLLEN: ICD-10-CM

## 2024-09-12 DIAGNOSIS — R05.3 CHRONIC COUGH: ICD-10-CM

## 2024-09-12 DIAGNOSIS — Z12.39 ENCOUNTER FOR OTHER SCREENING FOR MALIGNANT NEOPLASM OF BREAST: ICD-10-CM

## 2024-09-12 DIAGNOSIS — Z90.710 ACQUIRED ABSENCE OF BOTH CERVIX AND UTERUS: Chronic | ICD-10-CM

## 2024-09-12 DIAGNOSIS — Z83.6 FAMILY HISTORY OF OTHER DISEASES OF THE RESPIRATORY SYSTEM: ICD-10-CM

## 2024-09-12 PROCEDURE — 77063 BREAST TOMOSYNTHESIS BI: CPT | Mod: 26

## 2024-09-12 PROCEDURE — 99214 OFFICE O/P EST MOD 30 MIN: CPT | Mod: 25

## 2024-09-12 PROCEDURE — 77067 SCR MAMMO BI INCL CAD: CPT

## 2024-09-12 PROCEDURE — 77067 SCR MAMMO BI INCL CAD: CPT | Mod: 26

## 2024-09-12 PROCEDURE — 77080 DXA BONE DENSITY AXIAL: CPT | Mod: 26

## 2024-09-12 PROCEDURE — 94010 BREATHING CAPACITY TEST: CPT

## 2024-09-12 PROCEDURE — 77063 BREAST TOMOSYNTHESIS BI: CPT

## 2024-09-12 PROCEDURE — 77080 DXA BONE DENSITY AXIAL: CPT

## 2024-09-12 PROCEDURE — 99204 OFFICE O/P NEW MOD 45 MIN: CPT | Mod: 25

## 2024-09-12 NOTE — HISTORY OF PRESENT ILLNESS
[de-identified] : In office to be evaluated for increased allergies and cough.  Returning patient to our office. Symptoms of 2 months duration, consisting of stuffy nose, watery eyes and cough.  Also allergic shiners.  Seen in urgent care 3 times.  Initially she was recommended azelastine nasal spray and hydroxyzine 50 at bedtime, but she was still coughing.  She went back and she was treated with prednisone for 5 days which helped partially.  The third time she was diagnosed with bronchitis and received doxycycline, albuterol inhaler, benzonatate Perles, and fluticasone nasal spray.  She completed the antibiotic treatment and she still coughing.  Chest x-ray was reported normal.  Did not try azelastine and fluticasone together.  At the same time, taking Zyrtec 10 mg at bedtime and or Allegra 180 mg in the morning.  Using the antihistamines daily from late spring.  Followed up in our office in the past for allergic rhinitis.  She had a time when he had significant cough.  When she had significant cough, spirometry was normal in 2015 and exhaled nitric oxide was 27.  At the time, cough had incomplete response to prednisone and Dulera.  Singulair helped better.  She was diagnosed with cough variant asthma.  In 2018 she had spirometry from pulmonary physician that was excellent. She received immunotherapy with extracts of trees, grass, mold, ragweed, weeds, dust mites, cat and dog between 2009 and 2020.   She was evaluated in 2001 for pruritic rashes.  A patch test was positive for balsam of [place] and she was diagnosed with contact dermatitis.  She also had urticaria while she was in [place], attributed to solar urticaria from Zoloft.

## 2024-09-12 NOTE — REVIEW OF SYSTEMS
[Nasal Congestion] : nasal congestion [Post Nasal Drip] : post nasal drip [Cough] : cough [Recurrent Sinus Infections] : no recurrent sinus infections [Recurrent Pneumonia] : no ~T recurrent pneumonia [FreeTextEntry3] : Watery eyes

## 2024-09-12 NOTE — PHYSICAL EXAM
[Alert] : alert [No Acute Distress] : no acute distress [Normal Voice/Communication] : normal voice communication [Supple] : the neck was supple [Normal S1, S2] : normal S1 and S2 [Regular Rhythm] : with a regular rhythm [Soft] : abdomen soft [Not Tender] : non-tender [No HSM] : no hepato-splenomegaly [Normal Cervical Lymph Nodes] : cervical [Skin Intact] : skin intact  [No Rash] : no rash [No clubbing] : no clubbing [No Cyanosis] : no cyanosis [Alert, Awake, Oriented as Age-Appropriate] : alert, awake, oriented as age appropriate [de-identified] : Occasional slightly tight cough. [de-identified] : Eyes clear, allergic shiners. [de-identified] : Throat minimal erythema.  Nasal mucosa pink, mild bilateral stuffy nose, scant eye discharge.  Tympanic membranes normal.  No sinus tenderness. [de-identified] : Chest clear, good air entry, no wheezing, rhonchi or crackles.

## 2024-09-12 NOTE — REASON FOR VISIT
[Evaluation/Consultation] : an evaluation/consultation of [Allergic Rhinitis] : allergic rhinitis [Cough] : cough

## 2024-09-12 NOTE — ASSESSMENT
[FreeTextEntry1] : Chronic cough: Most likely due to cough variant asthma.  Spirometry excellent, similar to 2018.  Trial of Trelegy 100 (sample), 1 puff daily in the morning. Allergic rhinitis (pollen, dust mites, mold): Continue daily antihistamine.  Trial of Ryaltris (sample given), 1 to 2 sprays per nostril twice daily.  When sample finished, try to use Flonase or Flonase Sensimist and azelastine 0.1%, 1 spray per nostril twice daily from both.  If not helping, will try to get precertification for Ryaltris. Follow-up in November.  Goes to [place] between October 4 and November 2.

## 2024-09-12 NOTE — SOCIAL HISTORY
[de-identified] : Condominium with gas forced air heating, central air conditioning, no carpet, 1 dog, no cigarette smoke exposure.  She smoked 1 pack/day for 35 years but she quit in 1996.

## 2024-09-12 NOTE — IMPRESSION
[Spirometry] : Spirometry [Normal Spirometry] : spirometry normal [FreeTextEntry1] : Spirometry excellent, similar to 2018.

## 2024-09-19 ENCOUNTER — NON-APPOINTMENT (OUTPATIENT)
Age: 79
End: 2024-09-19

## 2024-09-19 DIAGNOSIS — J45.991 COUGH VARIANT ASTHMA: ICD-10-CM

## 2024-09-19 RX ORDER — FLUTICASONE FUROATE, UMECLIDINIUM BROMIDE AND VILANTEROL TRIFENATATE 200; 62.5; 25 UG/1; UG/1; UG/1
200-62.5-25 POWDER RESPIRATORY (INHALATION) DAILY
Qty: 3 | Refills: 3 | Status: ACTIVE | COMMUNITY
Start: 2024-09-19 | End: 1900-01-01

## 2024-09-19 RX ORDER — OLOPATADINE HYDROCHLORIDE AND MOMETASONE FUROATE 25; 665 UG/1; UG/1
665-25 SPRAY, METERED NASAL TWICE DAILY
Qty: 3 | Refills: 3 | Status: ACTIVE | COMMUNITY
Start: 2024-09-19 | End: 1900-01-01

## 2024-09-29 ENCOUNTER — NON-APPOINTMENT (OUTPATIENT)
Age: 79
End: 2024-09-29

## 2024-09-30 ENCOUNTER — NON-APPOINTMENT (OUTPATIENT)
Age: 79
End: 2024-09-30

## 2024-10-15 NOTE — ASU PREOP CHECKLIST - SITE MARKED BY SURGEON
"Chief Complaint   Patient presents with    Procedure     Patient is here for a colp. Patient Declined urine pregnancy as she just emptied her bladder, had cycle 10/2/24 and had a HCG luis miguel level 10/1  Initial /70   Pulse 75   Wt 62.5 kg (137 lb 12.8 oz)   LMP 10/02/2024 (Approximate)   BMI 23.65 kg/m   Estimated body mass index is 23.65 kg/m  as calculated from the following:    Height as of 9/11/24: 1.626 m (5' 4\").    Weight as of this encounter: 62.5 kg (137 lb 12.8 oz).  Medication Reconciliation: complete    Prior to the start of the procedure and with procedural staff participation, I verbally confirmed the patient s identity using two indicators, relevant allergies, that the procedure was appropriate and matched the consent or emergent situation, and that the correct equipment/implants were available. Immediately prior to starting the procedure I conducted the Time Out with the procedural staff and re-confirmed the patient s name, procedure, and site/side. (The Joint Commission universal protocol was followed.)  Yes    Sedation (Moderate or Deep): None     Manisha Morfin LPN    " n/a

## 2024-11-04 ENCOUNTER — TRANSCRIPTION ENCOUNTER (OUTPATIENT)
Age: 79
End: 2024-11-04

## 2024-11-07 ENCOUNTER — APPOINTMENT (OUTPATIENT)
Dept: PEDIATRIC ALLERGY IMMUNOLOGY | Facility: CLINIC | Age: 79
End: 2024-11-07
Payer: MEDICARE

## 2024-11-07 DIAGNOSIS — J30.89 OTHER ALLERGIC RHINITIS: ICD-10-CM

## 2024-11-07 DIAGNOSIS — J30.1 ALLERGIC RHINITIS DUE TO POLLEN: ICD-10-CM

## 2024-11-07 DIAGNOSIS — J45.991 COUGH VARIANT ASTHMA: ICD-10-CM

## 2024-11-07 PROCEDURE — 99213 OFFICE O/P EST LOW 20 MIN: CPT

## 2024-11-11 ENCOUNTER — TRANSCRIPTION ENCOUNTER (OUTPATIENT)
Age: 79
End: 2024-11-11

## 2024-11-15 ENCOUNTER — LABORATORY RESULT (OUTPATIENT)
Age: 79
End: 2024-11-15

## 2024-11-20 ENCOUNTER — APPOINTMENT (OUTPATIENT)
Dept: INTERNAL MEDICINE | Facility: CLINIC | Age: 79
End: 2024-11-20
Payer: MEDICARE

## 2024-11-20 VITALS
WEIGHT: 173 LBS | OXYGEN SATURATION: 98 % | SYSTOLIC BLOOD PRESSURE: 140 MMHG | HEIGHT: 64 IN | BODY MASS INDEX: 29.53 KG/M2 | TEMPERATURE: 98.3 F | DIASTOLIC BLOOD PRESSURE: 82 MMHG | HEART RATE: 80 BPM

## 2024-11-20 VITALS — SYSTOLIC BLOOD PRESSURE: 138 MMHG | DIASTOLIC BLOOD PRESSURE: 78 MMHG

## 2024-11-20 DIAGNOSIS — E78.5 HYPERLIPIDEMIA, UNSPECIFIED: ICD-10-CM

## 2024-11-20 DIAGNOSIS — E03.9 HYPOTHYROIDISM, UNSPECIFIED: ICD-10-CM

## 2024-11-20 DIAGNOSIS — N39.0 URINARY TRACT INFECTION, SITE NOT SPECIFIED: ICD-10-CM

## 2024-11-20 DIAGNOSIS — L90.0 LICHEN SCLEROSUS ET ATROPHICUS: ICD-10-CM

## 2024-11-20 DIAGNOSIS — R31.9 URINARY TRACT INFECTION, SITE NOT SPECIFIED: ICD-10-CM

## 2024-11-20 LAB
25(OH)D3 SERPL-MCNC: 51.9 NG/ML
ALBUMIN SERPL ELPH-MCNC: 4.6 G/DL
ALP BLD-CCNC: 68 U/L
ALT SERPL-CCNC: 19 U/L
ANION GAP SERPL CALC-SCNC: 12 MMOL/L
APPEARANCE: CLEAR
AST SERPL-CCNC: 25 U/L
BILIRUB SERPL-MCNC: 0.5 MG/DL
BILIRUBIN URINE: NEGATIVE
BLOOD URINE: NEGATIVE
BUN SERPL-MCNC: 18 MG/DL
CALCIUM SERPL-MCNC: 9.7 MG/DL
CHLORIDE SERPL-SCNC: 104 MMOL/L
CHOLEST SERPL-MCNC: 147 MG/DL
CO2 SERPL-SCNC: 25 MMOL/L
COLOR: YELLOW
CREAT SERPL-MCNC: 0.88 MG/DL
EGFR: 66 ML/MIN/1.73M2
ESTIMATED AVERAGE GLUCOSE: 114 MG/DL
GLUCOSE QUALITATIVE U: NEGATIVE MG/DL
GLUCOSE SERPL-MCNC: 90 MG/DL
HBA1C MFR BLD HPLC: 5.6 %
HCT VFR BLD CALC: 36.4 %
HCV AB SER QL: NONREACTIVE
HCV S/CO RATIO: 0.28 S/CO
HDLC SERPL-MCNC: 67 MG/DL
HGB BLD-MCNC: 11.9 G/DL
KETONES URINE: NEGATIVE MG/DL
LDLC SERPL CALC-MCNC: 64 MG/DL
LEUKOCYTE ESTERASE URINE: ABNORMAL
MCHC RBC-ENTMCNC: 31.4 PG
MCHC RBC-ENTMCNC: 32.7 G/DL
MCV RBC AUTO: 96 FL
NITRITE URINE: NEGATIVE
NONHDLC SERPL-MCNC: 79 MG/DL
PH URINE: 6
PLATELET # BLD AUTO: 258 K/UL
POTASSIUM SERPL-SCNC: 4.7 MMOL/L
PROT SERPL-MCNC: 7.6 G/DL
PROTEIN URINE: NEGATIVE MG/DL
RBC # BLD: 3.79 M/UL
RBC # FLD: 13 %
SODIUM SERPL-SCNC: 140 MMOL/L
SPECIFIC GRAVITY URINE: 1.02
TRIGL SERPL-MCNC: 81 MG/DL
TSH SERPL-ACNC: 1.24 UIU/ML
UROBILINOGEN URINE: 0.2 MG/DL
WBC # FLD AUTO: 7.49 K/UL

## 2024-11-20 PROCEDURE — G0439: CPT

## 2024-11-20 RX ORDER — NITROFURANTOIN (MONOHYDRATE/MACROCRYSTALS) 25; 75 MG/1; MG/1
100 CAPSULE ORAL
Qty: 10 | Refills: 0 | Status: ACTIVE | COMMUNITY
Start: 2024-11-20 | End: 1900-01-01

## 2024-12-09 ENCOUNTER — TRANSCRIPTION ENCOUNTER (OUTPATIENT)
Age: 79
End: 2024-12-09

## 2024-12-10 ENCOUNTER — TRANSCRIPTION ENCOUNTER (OUTPATIENT)
Age: 79
End: 2024-12-10

## 2025-01-22 NOTE — REASON FOR VISIT
2025      Olga Dawkins  [address]   [place]  [place] [address]      Dear Colleague,    Thank you for referring your patient, Olga Dawkins, to the [facility] RADIATION ONCOLOGY. Please see a copy of my visit note below.       Department of Radiation Oncology  [facility]  [address] [place] [address]  [phone]       Consultation Note    Name: Olga Dawkins MRN: [id number]   : 1972   Date of Service: 2025  Referring: Dr. Julieta Rock      Diagnosis: Metastatic endometrioid carcinoma of the ovary  Stage: IV    History of Present Illness   Ms. 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 She has completed multiple lines of systemic therapy as well as ablation of a right diaphragmatic/cardiophrenic metastasis. This lesion has become more painful and she was referred to our clinic to discuss palliative radiation to the right anterior chest wall metastasis.     Ms. Vasquez initially presented in 2022 and was diagnosed with Stage IIIB, Grade 2 endometrioid carcinoma with mucinous differentiation. She underwent simple hysterectomy, left salpingo-oophorectomy, omentectomy, pelvic peritoneum excision, bladder serosa excision, right IP excision, and small bowel mesentery excision in 2022 with pathology notable for a 23.5 x 17.8 x 16.6 cm tumor of the left ovary and involvement of the pelvic peritoneum, omentum, bladder serosa and small bowel mesentery.     She initially completed 6 cycles of adjuvant carboplatin and paclitaxel followed by daily Zejula. In late , she had a localized 3.5 x 3.3 cm mixed density lesion superior to the diaphragm. Coupled with a rising CEA, this was a suspected metastasis, but biopsy was not pursued. She  completed image guided ablation of the right diaphragmatic metastasis on 1/12/2024.     Post procedure, she experienced chronic pleural effusions and dyspnea. Subsequent scans were notable for prominent lymph nodes in the right upper abdomen, left adrenal nodularity, and a borderline left retroperitoneal lymph node. There was a continued soft thickening along the right hemidiaphragm and right pleura in the area of prior ablation.     She presented to the ED multiple times due to right chest pain. MRI of the chest performed on 12/31/24 showed a stable 3.2 x 2.8 cm nodule at the level of the anterior right fifth-sixth costal cartilage and enlarging posterior mediastinal and right internal mammary nodes.     Given continued pain in the right chest wall, referral was placed for consideration of palliative radiation.       Currently, the patient reports 7/10 pain in the right chest wall that radiated posteriorly for which she is using advil and oxycodone for pain control. She feels the pain is close to the area of prior ablation but not necessarily in the same location. Pain began last July and has gotten progressively worse over the last several weeks. It is exacerbated by activity and lifting.       CHEMOTHERAPY HISTORY: As Above   PACEMAKER: Denies  PREGNANCY STATUS: N/A  PAST RADIATION THERAPY HISTORY: Denies    PAST MEDICAL HISTORY:  Past Medical History:   Diagnosis Date     Anemia      Endometrioid carcinoma metastatic to ovary (H)     tumor of the L ovary and involvment of pelvic periotoneum, omentum, bladder serosa and small bowel mesentery     Prediabetes        PAST SURGICAL HISTORY:  Past Surgical History:   Procedure Laterality Date     EP ABLATION  01/12/2024    Pt had ablation of R diaphragmatic metastasis     HYSTERECTOMY  06/2022    In 6/2022 Pt had a simple hysterectomy, left salpingo-oophorectomy, omentectomy, pelvic peritoneum excision, bladder serosa excision, right IP excision, and small bowel  mesentery excision       ALLERGIES:  Allergies as of 2025     (No Known Allergies)       MEDICATIONS:  Current Outpatient Medications   Medication Sig Dispense Refill     albuterol (PROAIR HFA, PROVENTIL HFA, VENTOLIN HFA) 108 (90 BASE) MCG/ACT inhaler Inhale 2 puffs into the lungs every 6 hours 18 g 2     FLUoxetine (PROZAC) 20 MG capsule Take 3 capsules (60 mg) by mouth daily 90 capsule 2     fluticasone (FLONASE) 50 MCG/ACT nasal spray Spray 2 sprays into both nostrils daily 16 g 2     traZODone (DESYREL) 100 MG tablet Take 1 tablet (100 mg) by mouth At Bedtime 30 tablet 1     dicyclomine (BENTYL) 10 MG capsule Take 1 capsule (10 mg) by mouth 4 times daily (before meals and nightly) (Patient not taking: Reported on 2025) 240 capsule 2     montelukast (SINGULAIR) 4 MG chewable tablet Take 1 tablet (4 mg) by mouth At Bedtime (Patient not taking: Reported on 2025) 30 tablet 2        FAMILY HISTORY:  No family history on file.    SOCIAL HISTORY:  Social History     Socioeconomic History     Marital status: Single     Spouse name: Not on file     Number of children: Not on file     Years of education: Not on file     Highest education level: Not on file   Occupational History     Not on file   Tobacco Use     Smoking status: Former     Current packs/day: 0.00     Types: Cigarettes     Quit date: 3/4/2016     Years since quittin.8     Smokeless tobacco: Never   Substance and Sexual Activity     Alcohol use: No     Alcohol/week: 0.0 standard drinks of alcohol     Drug use: No     Sexual activity: Not on file   Other Topics Concern     Not on file   Social History Narrative     Not on file     Social Drivers of Health     Financial Resource Strain: Not At Risk (2023)    Received from [facility]    Financial Resource Strain      Is it hard for you to pay for the very basics like food, housing, medical care or heating?: No   Food Insecurity: No Food Insecurity (2025)    Received from [facility]    Hunger Vital Sign      Worried About Running Out of Food in the Last Year: Never true      Ran Out of Food in the Last Year: Never true   Transportation Needs: No Transportation Needs (1/14/2025)    Received from [facility]    PRAPARE - Transportation      Lack of Transportation (Medical): No      Lack of Transportation (Non-Medical): No   Physical Activity: Sufficiently Active (1/14/2025)    Received from [facility]    Exercise Vital Sign      Days of Exercise per Week: 7 days      Minutes of Exercise per Session: 40 min   Stress: Not on file   Social Connections: Unknown (8/13/2024)    Received from [facility]    Social Connections      Frequency of Communication with Friends and Family: Not asked      Frequency of Social Gatherings with Friends and Family: Not asked   Interpersonal Safety: Not At Risk (9/30/2024)    Received from [facility]    Humiliation, Afraid, Rape, and Kick questionnaire      Fear of Current or Ex-Partner: No      Emotionally Abused: No      Physically Abused: No      Sexually Abused: No   Housing Stability: Low Risk  (1/14/2025)    Received from [facility]    Housing Stability      What is your living situation today?: I have a steady place to live         Review of Systems   A 12-point review of systems was performed. Pertinent findings are noted in the HPI.    Physical Exam   ECOG Status: 1    Due to the nature of video visit, physical exam is limited.     VITALS: There were no vitals taken for this visit.  GEN: Appears well, alert, oriented, and in NAD  HEENT: EOMI  PSYCH: Appropriate mood and affect    Imaging/Path/Labs   Imaging: per hpi     Path: per hpi     Labs: reviewed    Assessment    Ms. 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 She has completed multiple lines of systemic  [Follow-Up: _____] : a [unfilled] follow-up visit therapy as well as ablation of a right diaphragmatic/cardiophrenic metastasis. This lesion has become more painful and she was referred to our clinic to discuss palliative radiation to the right anterior chest wall metastasis.     Plan   We had a detailed discussion with Ms. Dawkins regarding her disease course and relevant management options as they pertain to radiation. In summary, the patient has had a right anterior residual chest wall/pleural bases metastasis that was treated with ablation 1 year ago. While the pleural based disease partially responded to ablation, it appears the chest wall aspect of the disease has shown interval growth over recent CT scans with possible evidence of invasion of the rib/costal cartilage.     She has reported significant pain along the anterior right chest wall consistent with this area of residual metastasis. We therefore recommend palliative radiation to this lesion with the primary goal of reducing pain. Treatment will likely be delivered over 5 daily fractions and she has CT simulation planned for 1/23/35 with urgent treatment beginning the same day in order to complete RT prior to her next chemo infusion.     Risks and benefits of palliative RT to the chest wall were discussed in detail with Ms. Dawkins. She expressed an understanding of these risks an wishes to proceed with treatment.     Greater than 30 minutes were spent on video visit counseling the patient. Virtual consent was obtained for treatment.     Patient was seen and discussed with Dr. Ruano.    Arley Ritchie MD  PGY-2  Radiation Oncology    Physician Attestation   I, Janie Ruano, saw this patient and agree with the findings and plan of care as documented in the note.   I was present for key portions of the history and physical exam.  Briefly, Ms. 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  She was found to have recurrent/metastatic disease confirmed on CT from 12/20/2023 with an anterior right diaphragmatic lesion growing from prior imaging.  She has subsequently completed multiple courses of systemic therapy, most recently and presently on carboplatin and paclitaxel, started 12/19/2024.  She is seen in consultation regarding an increasingly painful anterior right chest wall metastasis measuring 3.2 cm at the level of the anterior right fifth to 6 costal cartilage, in the vicinity of prior ablation done in January 2024.  Other findings on recent imaging include increasing adenopathy in the mediastinum and internal mammary regions, upper abdomen, and retroperitoneal areas, and interval increase in the nodule in the left upper quadrant of the abdomen, and stable soft tissue density at the left renal hilum.    We recommended palliative radiation to the right anterior chest wall metastasis.  We explained the palliative, symptom-focused intention of such therapy.  We noted that the goal was to reduce pain attributable to cancer activity at the bone site.  We specifically noted that such therapy is targeted to a specific area of the body and is not felt to be effective in impacting patient survival time.      We discussed that doses used for palliative radiation therapy are generally not ablative.  This is because out of the curative context, we are balancing a dose capable of reducing or preventing symptoms with minimizing incidental irradiation of adjacent normal structures that may result in morbidity.  Moreover,  we use foreshortened courses to reduce the overall treatment duration to optimize quality of life and avoid delaying systemic therapy, other interventions, or transitions in care such as initiation of hospice when applicable.      We discussed the risks (short and long term as listed on consent), benefits, and alternatives to radiotherapy.  The risks of radiation include but are not limited to: pain flare, skin irritation, hair loss in the area treated, fibrosis, joint stiffness, edema, wound healing difficulties, increased risk of fracture, damage to nerves, and tumors caused by radiation.  We provided educational material regarding self care of the most common side effects.     She expressed clear understanding of the palliative intent of therapy. Ms. Dawkins is in agreement with this plan and will be scheduled for CT-based simulation for radiation planning.     Plan:  1) Palliative radiotherapy over 5 fractions, 3D CRT, CBCT on day 1 only.  2) Simulation will be performed in supine, arms at side, free breathing.  3) In order to permit her to start chemotherapy as planned next Thursday, we will do a SIM and treat tomorrow as an outpatient.      We appreciate the opportunity to participate in Ms. Dawkins's care. She was encouraged to contact me with questions or concerns should they arise.    I personally reviewed the available CT and MRI images. Laboratory data and pathology as noted above was reviewed. I reviewed previous medical records, which are summarized in the HPI. A total of 60 minutes were spent (including face to face time) during this visit, and more than 50% of the time was spent in counseling and coordination of care.     Janie Ruano MD MPH PhD    Department of Radiation Oncology         Oncology Rooming Note    January 22, 2025 9:20 AM   Olga Dawkins is a 52 year old female who presents for:    Chief Complaint   Patient presents with     Cancer     Consult    Metastatic endometrioid carcinoma of the ovary    Initial Vitals: There were no vitals taken for this visit. There is no height or weight on file to calculate BMI. There is no height or weight on file to calculate BSA.  Data Unavailable Comment: Data Unavailable   No LMP recorded.  Allergies reviewed: Yes  Medications reviewed: Yes    Medications: Medication refills not needed today.  Pharmacy name entered into EPIC: [facility] - [place], [place] - [address]    Frailty Screening:   Is the patient here for a new oncology consult visit in cancer care? 1. Yes. Over the past month, have you experienced difficulty or required a caregiver to assist with:   1. Balance, walking or general mobility (including any falls)? NO  2. Completion of self-care tasks such as bathing, dressing, toileting, grooming/hygiene?  NO  3. Concentration or memory that affects your daily life?  NO     Considerations for radiation treatment   Pregnancy status: Female with hysterectomy   Implanted Cardiac Devices: No   Any previous radiation therapy: No    Clinical concerns: Pt is here to discuss palliative radiation to a R anterior chest wall metastasis that has become more painful. Pt states the pain as sharp and 7/10 for severity.  The pain also radiates to her back intermittently.  Her pain regimen consists of taking five Advil 200 mg tablets at a time approximately every 6 hours.  She is also infrequently taking oxycodone 10 mg at  bedtime.   Dr. Ritchie was notified.    Virtual Visit Details    Type of service:  Video Visit     Originating Location (pt. Location): Home    Distant Location (provider location):  On-site  Platform used for Video Visit: Maikel Dobbs RN              Again, thank you for allowing me to participate in the care of your patient.        Sincerely,        Janie Ruano    Electronically signed

## 2025-02-07 ENCOUNTER — RX RENEWAL (OUTPATIENT)
Age: 80
End: 2025-02-07

## 2025-05-08 ENCOUNTER — APPOINTMENT (OUTPATIENT)
Dept: DERMATOLOGY | Facility: CLINIC | Age: 80
End: 2025-05-08

## 2025-05-09 ENCOUNTER — RX RENEWAL (OUTPATIENT)
Age: 80
End: 2025-05-09

## 2025-06-04 ENCOUNTER — APPOINTMENT (OUTPATIENT)
Dept: PEDIATRIC ALLERGY IMMUNOLOGY | Facility: CLINIC | Age: 80
End: 2025-06-04
Payer: MEDICARE

## 2025-06-04 ENCOUNTER — NON-APPOINTMENT (OUTPATIENT)
Age: 80
End: 2025-06-04

## 2025-06-04 VITALS
OXYGEN SATURATION: 96 % | HEART RATE: 80 BPM | DIASTOLIC BLOOD PRESSURE: 74 MMHG | SYSTOLIC BLOOD PRESSURE: 147 MMHG | TEMPERATURE: 98.2 F

## 2025-06-04 DIAGNOSIS — J30.89 OTHER ALLERGIC RHINITIS: ICD-10-CM

## 2025-06-04 DIAGNOSIS — J30.1 ALLERGIC RHINITIS DUE TO POLLEN: ICD-10-CM

## 2025-06-04 DIAGNOSIS — J45.991 COUGH VARIANT ASTHMA: ICD-10-CM

## 2025-06-04 DIAGNOSIS — R09.82 POSTNASAL DRIP: ICD-10-CM

## 2025-06-04 DIAGNOSIS — M94.0 CHONDROCOSTAL JUNCTION SYNDROME [TIETZE]: ICD-10-CM

## 2025-06-04 PROCEDURE — 95012 NITRIC OXIDE EXP GAS DETER: CPT

## 2025-06-04 PROCEDURE — 94010 BREATHING CAPACITY TEST: CPT

## 2025-06-04 PROCEDURE — 99213 OFFICE O/P EST LOW 20 MIN: CPT | Mod: 25

## 2025-06-04 RX ORDER — FLUTICASONE PROPIONATE 50 UG/1
50 SPRAY, METERED NASAL
Refills: 0 | Status: ACTIVE | COMMUNITY

## 2025-06-09 RX ORDER — ALBUTEROL SULFATE 90 UG/1
108 (90 BASE) INHALANT RESPIRATORY (INHALATION)
Qty: 3 | Refills: 0 | Status: ACTIVE | COMMUNITY
Start: 2025-06-04 | End: 1900-01-01

## 2025-06-17 ENCOUNTER — APPOINTMENT (OUTPATIENT)
Dept: INTERNAL MEDICINE | Facility: CLINIC | Age: 80
End: 2025-06-17
Payer: MEDICARE

## 2025-06-17 VITALS
OXYGEN SATURATION: 97 % | SYSTOLIC BLOOD PRESSURE: 134 MMHG | HEIGHT: 64 IN | RESPIRATION RATE: 14 BRPM | DIASTOLIC BLOOD PRESSURE: 80 MMHG | HEART RATE: 90 BPM | BODY MASS INDEX: 29.71 KG/M2 | WEIGHT: 174 LBS

## 2025-06-17 PROBLEM — K21.9 GERD (GASTROESOPHAGEAL REFLUX DISEASE): Status: ACTIVE | Noted: 2025-06-17

## 2025-06-17 PROCEDURE — G2211 COMPLEX E/M VISIT ADD ON: CPT

## 2025-06-17 PROCEDURE — 99214 OFFICE O/P EST MOD 30 MIN: CPT

## 2025-06-17 RX ORDER — CRANBERRY FRUIT EXTRACT 200 MG
CAPSULE ORAL
Refills: 0 | Status: ACTIVE | COMMUNITY

## 2025-06-17 RX ORDER — OMEPRAZOLE MAGNESIUM 20 MG/1
20 CAPSULE, DELAYED RELEASE ORAL
Refills: 0 | Status: ACTIVE | COMMUNITY

## 2025-06-17 RX ORDER — UBIDECARENONE/VIT E ACET 100MG-5
50 MCG CAPSULE ORAL
Refills: 0 | Status: ACTIVE | COMMUNITY

## 2025-06-24 ENCOUNTER — NON-APPOINTMENT (OUTPATIENT)
Age: 80
End: 2025-06-24

## 2025-07-01 RX ORDER — BENZONATATE 100 MG/1
100 CAPSULE ORAL
Qty: 28 | Refills: 1 | Status: ACTIVE | COMMUNITY
Start: 2025-07-01 | End: 1900-01-01

## 2025-07-19 ENCOUNTER — NON-APPOINTMENT (OUTPATIENT)
Age: 80
End: 2025-07-19

## 2025-07-30 NOTE — HISTORY OF PRESENT ILLNESS
2025        Dominick Matthews   [address] [place] [address]      Dominicklm Mattehws,  :  1959    I'm writing to let you know about the tests that were taken recently.   Thank you for allowing [facility] the opportunity to take part in your healthcare.  At [facility] we strive to provide each patient with the finest gastroenterology care available.  We hope your experience was pleasant and informative.    The pill capsule showed some debri in the small bowel, but the visualized mucosa was normal without any inflammation or ulcers.  Please keep your IBD clinic appointment.    I hope you are feeling better.        Thank you.    Electronically signed by:  Tia Corcoran MD 2025 09:01 AM  Document generated by:  Tia Corcoran MD  2025  If your provider ordered multiple tests; the results may not become available at the same time.  If multiple test results are received within 14 days of one another, you may receive a duplicate.  cc:  Carloz Jimenez MD  cc:  Tia Corcoran MD  cc:  Carloz Jimenez MD     [FreeTextEntry1] : FATUMA BRISCOE is a 75 year F who comes in for a follow up visit.\par  [de-identified] : Patient is a 75 year old female history of anxiety disorder, hypothyroidism, hyperlipidemia comes in for a followup visit. Patient has significant history of anxiety disorder for which she takes sertraline and is well controlled. She does require an emotional support animal for air travel and request an updated lab work for her upcoming travel to [place]. She also needs some medication refills.\par Patient denies any cp, sob,abdominal pain, nausea, vomiting, palpitations, fever, chills, constipation, diarrhea.\par

## 2025-08-28 ENCOUNTER — EMERGENCY (EMERGENCY)
Facility: HOSPITAL | Age: 80
LOS: 0 days | Discharge: HOME | End: 2025-08-28
Attending: EMERGENCY MEDICINE
Payer: MEDICARE

## 2025-08-28 VITALS
TEMPERATURE: 99 F | HEART RATE: 80 BPM | DIASTOLIC BLOOD PRESSURE: 65 MMHG | RESPIRATION RATE: 16 BRPM | OXYGEN SATURATION: 96 % | SYSTOLIC BLOOD PRESSURE: 136 MMHG

## 2025-08-28 VITALS
HEART RATE: 96 BPM | SYSTOLIC BLOOD PRESSURE: 151 MMHG | RESPIRATION RATE: 18 BRPM | TEMPERATURE: 99 F | OXYGEN SATURATION: 100 % | DIASTOLIC BLOOD PRESSURE: 84 MMHG | WEIGHT: 174.83 LBS

## 2025-08-28 DIAGNOSIS — Z90.710 ACQUIRED ABSENCE OF BOTH CERVIX AND UTERUS: Chronic | ICD-10-CM

## 2025-08-28 DIAGNOSIS — Z88.0 ALLERGY STATUS TO PENICILLIN: ICD-10-CM

## 2025-08-28 DIAGNOSIS — Z91.048 OTHER NONMEDICINAL SUBSTANCE ALLERGY STATUS: ICD-10-CM

## 2025-08-28 DIAGNOSIS — H26.9 UNSPECIFIED CATARACT: Chronic | ICD-10-CM

## 2025-08-28 DIAGNOSIS — K57.92 DIVERTICULITIS OF INTESTINE, PART UNSPECIFIED, WITHOUT PERFORATION OR ABSCESS WITHOUT BLEEDING: ICD-10-CM

## 2025-08-28 DIAGNOSIS — R10.32 LEFT LOWER QUADRANT PAIN: ICD-10-CM

## 2025-08-28 DIAGNOSIS — Z98.891 HISTORY OF UTERINE SCAR FROM PREVIOUS SURGERY: Chronic | ICD-10-CM

## 2025-08-28 DIAGNOSIS — Z98.890 OTHER SPECIFIED POSTPROCEDURAL STATES: Chronic | ICD-10-CM

## 2025-08-28 DIAGNOSIS — K52.9 NONINFECTIVE GASTROENTERITIS AND COLITIS, UNSPECIFIED: ICD-10-CM

## 2025-08-28 LAB
ALBUMIN SERPL ELPH-MCNC: 4 G/DL — SIGNIFICANT CHANGE UP (ref 3.3–5)
ALP SERPL-CCNC: 69 U/L — SIGNIFICANT CHANGE UP (ref 40–120)
ALT FLD-CCNC: 25 U/L — SIGNIFICANT CHANGE UP (ref 12–78)
ANION GAP SERPL CALC-SCNC: 6 MMOL/L — SIGNIFICANT CHANGE UP (ref 5–17)
APTT BLD: 29.8 SEC — SIGNIFICANT CHANGE UP (ref 26.1–36.8)
AST SERPL-CCNC: 17 U/L — SIGNIFICANT CHANGE UP (ref 15–37)
BASOPHILS # BLD AUTO: 0.02 K/UL — SIGNIFICANT CHANGE UP (ref 0–0.2)
BASOPHILS NFR BLD AUTO: 0.2 % — SIGNIFICANT CHANGE UP (ref 0–2)
BILIRUB SERPL-MCNC: 0.4 MG/DL — SIGNIFICANT CHANGE UP (ref 0.2–1.2)
BUN SERPL-MCNC: 16 MG/DL — SIGNIFICANT CHANGE UP (ref 7–23)
CALCIUM SERPL-MCNC: 9.3 MG/DL — SIGNIFICANT CHANGE UP (ref 8.5–10.1)
CHLORIDE SERPL-SCNC: 107 MMOL/L — SIGNIFICANT CHANGE UP (ref 96–108)
CO2 SERPL-SCNC: 25 MMOL/L — SIGNIFICANT CHANGE UP (ref 22–31)
CREAT SERPL-MCNC: 0.8 MG/DL — SIGNIFICANT CHANGE UP (ref 0.5–1.3)
EGFR: 74 ML/MIN/1.73M2 — SIGNIFICANT CHANGE UP
EGFR: 74 ML/MIN/1.73M2 — SIGNIFICANT CHANGE UP
EOSINOPHIL # BLD AUTO: 0.07 K/UL — SIGNIFICANT CHANGE UP (ref 0–0.5)
EOSINOPHIL NFR BLD AUTO: 0.7 % — SIGNIFICANT CHANGE UP (ref 0–6)
GLUCOSE SERPL-MCNC: 100 MG/DL — HIGH (ref 70–99)
HCT VFR BLD CALC: 37.5 % — SIGNIFICANT CHANGE UP (ref 34.5–45)
HGB BLD-MCNC: 12.2 G/DL — SIGNIFICANT CHANGE UP (ref 11.5–15.5)
IMM GRANULOCYTES # BLD AUTO: 0.04 K/UL — SIGNIFICANT CHANGE UP (ref 0–0.07)
IMM GRANULOCYTES NFR BLD AUTO: 0.4 % — SIGNIFICANT CHANGE UP (ref 0–0.9)
INR BLD: 1.02 RATIO — SIGNIFICANT CHANGE UP (ref 0.85–1.16)
LACTATE SERPL-SCNC: 0.9 MMOL/L — SIGNIFICANT CHANGE UP (ref 0.7–2)
LIDOCAIN IGE QN: 59 U/L — SIGNIFICANT CHANGE UP (ref 13–75)
LYMPHOCYTES # BLD AUTO: 1.76 K/UL — SIGNIFICANT CHANGE UP (ref 1–3.3)
LYMPHOCYTES NFR BLD AUTO: 17.2 % — SIGNIFICANT CHANGE UP (ref 13–44)
MCHC RBC-ENTMCNC: 31.1 PG — SIGNIFICANT CHANGE UP (ref 27–34)
MCHC RBC-ENTMCNC: 32.5 G/DL — SIGNIFICANT CHANGE UP (ref 32–36)
MCV RBC AUTO: 95.7 FL — SIGNIFICANT CHANGE UP (ref 80–100)
MONOCYTES # BLD AUTO: 0.58 K/UL — SIGNIFICANT CHANGE UP (ref 0–0.9)
MONOCYTES NFR BLD AUTO: 5.7 % — SIGNIFICANT CHANGE UP (ref 2–14)
NEUTROPHILS # BLD AUTO: 7.79 K/UL — HIGH (ref 1.8–7.4)
NEUTROPHILS NFR BLD AUTO: 75.8 % — SIGNIFICANT CHANGE UP (ref 43–77)
NRBC # BLD AUTO: 0 K/UL — SIGNIFICANT CHANGE UP (ref 0–0)
NRBC # FLD: 0 K/UL — SIGNIFICANT CHANGE UP (ref 0–0)
NRBC BLD AUTO-RTO: 0 /100 WBCS — SIGNIFICANT CHANGE UP (ref 0–0)
PLATELET # BLD AUTO: 286 K/UL — SIGNIFICANT CHANGE UP (ref 150–400)
PMV BLD: 9.2 FL — SIGNIFICANT CHANGE UP (ref 7–13)
POTASSIUM SERPL-MCNC: 4.2 MMOL/L — SIGNIFICANT CHANGE UP (ref 3.5–5.3)
POTASSIUM SERPL-SCNC: 4.2 MMOL/L — SIGNIFICANT CHANGE UP (ref 3.5–5.3)
PROT SERPL-MCNC: 8.1 GM/DL — SIGNIFICANT CHANGE UP (ref 6–8.3)
PROTHROM AB SERPL-ACNC: 11.8 SEC — SIGNIFICANT CHANGE UP (ref 9.9–13.4)
RBC # BLD: 3.92 M/UL — SIGNIFICANT CHANGE UP (ref 3.8–5.2)
RBC # FLD: 13 % — SIGNIFICANT CHANGE UP (ref 10.3–14.5)
SODIUM SERPL-SCNC: 138 MMOL/L — SIGNIFICANT CHANGE UP (ref 135–145)
WBC # BLD: 10.26 K/UL — SIGNIFICANT CHANGE UP (ref 3.8–10.5)
WBC # FLD AUTO: 10.26 K/UL — SIGNIFICANT CHANGE UP (ref 3.8–10.5)

## 2025-08-28 PROCEDURE — 36415 COLL VENOUS BLD VENIPUNCTURE: CPT

## 2025-08-28 PROCEDURE — 96374 THER/PROPH/DIAG INJ IV PUSH: CPT | Mod: XU

## 2025-08-28 PROCEDURE — 83605 ASSAY OF LACTIC ACID: CPT

## 2025-08-28 PROCEDURE — 85025 COMPLETE CBC W/AUTO DIFF WBC: CPT

## 2025-08-28 PROCEDURE — 74177 CT ABD & PELVIS W/CONTRAST: CPT | Mod: 26

## 2025-08-28 PROCEDURE — 74177 CT ABD & PELVIS W/CONTRAST: CPT

## 2025-08-28 PROCEDURE — 83690 ASSAY OF LIPASE: CPT

## 2025-08-28 PROCEDURE — 99285 EMERGENCY DEPT VISIT HI MDM: CPT | Mod: FS

## 2025-08-28 PROCEDURE — 80053 COMPREHEN METABOLIC PANEL: CPT

## 2025-08-28 PROCEDURE — 85610 PROTHROMBIN TIME: CPT

## 2025-08-28 PROCEDURE — 99284 EMERGENCY DEPT VISIT MOD MDM: CPT | Mod: 25

## 2025-08-28 PROCEDURE — 85730 THROMBOPLASTIN TIME PARTIAL: CPT

## 2025-08-28 RX ORDER — METRONIDAZOLE 250 MG
500 TABLET ORAL ONCE
Refills: 0 | Status: COMPLETED | OUTPATIENT
Start: 2025-08-28 | End: 2025-08-28

## 2025-08-28 RX ORDER — ACETAMINOPHEN 500 MG/5ML
1000 LIQUID (ML) ORAL ONCE
Refills: 0 | Status: COMPLETED | OUTPATIENT
Start: 2025-08-28 | End: 2025-08-28

## 2025-08-28 RX ORDER — CIPROFLOXACIN HCL 250 MG
1 TABLET ORAL
Qty: 20 | Refills: 0
Start: 2025-08-28 | End: 2025-09-06

## 2025-08-28 RX ORDER — METRONIDAZOLE 250 MG
1 TABLET ORAL
Qty: 30 | Refills: 0
Start: 2025-08-28 | End: 2025-09-06

## 2025-08-28 RX ORDER — CIPROFLOXACIN HCL 250 MG
500 TABLET ORAL ONCE
Refills: 0 | Status: COMPLETED | OUTPATIENT
Start: 2025-08-28 | End: 2025-08-28

## 2025-08-28 RX ADMIN — Medication 500 MILLIGRAM(S): at 16:18

## 2025-08-28 RX ADMIN — Medication 400 MILLIGRAM(S): at 15:39

## 2025-08-28 RX ADMIN — Medication 500 MILLIGRAM(S): at 16:17

## 2025-09-09 ENCOUNTER — NON-APPOINTMENT (OUTPATIENT)
Age: 80
End: 2025-09-09

## 2025-09-09 ENCOUNTER — APPOINTMENT (OUTPATIENT)
Dept: OBGYN | Facility: CLINIC | Age: 80
End: 2025-09-09
Payer: MEDICARE

## 2025-09-09 VITALS
BODY MASS INDEX: 29.71 KG/M2 | DIASTOLIC BLOOD PRESSURE: 70 MMHG | HEIGHT: 64 IN | SYSTOLIC BLOOD PRESSURE: 148 MMHG | WEIGHT: 174 LBS

## 2025-09-09 DIAGNOSIS — Z01.419 ENCOUNTER FOR GYNECOLOGICAL EXAMINATION (GENERAL) (ROUTINE) W/OUT ABNORMAL FINDINGS: ICD-10-CM

## 2025-09-09 PROCEDURE — G0101: CPT

## 2025-09-09 PROCEDURE — 82270 OCCULT BLOOD FECES: CPT

## 2025-09-15 ENCOUNTER — RESULT REVIEW (OUTPATIENT)
Age: 80
End: 2025-09-15

## 2025-09-15 ENCOUNTER — APPOINTMENT (OUTPATIENT)
Dept: MAMMOGRAPHY | Facility: CLINIC | Age: 80
End: 2025-09-15
Payer: MEDICARE

## 2025-09-15 PROCEDURE — 77067 SCR MAMMO BI INCL CAD: CPT | Mod: 26

## 2025-09-15 PROCEDURE — 77063 BREAST TOMOSYNTHESIS BI: CPT | Mod: 26

## 2025-09-17 ENCOUNTER — TRANSCRIPTION ENCOUNTER (OUTPATIENT)
Age: 80
End: 2025-09-17

## 2025-09-18 ENCOUNTER — TRANSCRIPTION ENCOUNTER (OUTPATIENT)
Age: 80
End: 2025-09-18

## 2025-09-19 ENCOUNTER — APPOINTMENT (OUTPATIENT)
Dept: ORTHOPEDIC SURGERY | Facility: CLINIC | Age: 80
End: 2025-09-19
Payer: MEDICARE

## 2025-09-19 ENCOUNTER — NON-APPOINTMENT (OUTPATIENT)
Age: 80
End: 2025-09-19

## 2025-09-19 DIAGNOSIS — M17.0 BILATERAL PRIMARY OSTEOARTHRITIS OF KNEE: ICD-10-CM

## 2025-09-19 DIAGNOSIS — M25.561 PAIN IN RIGHT KNEE: ICD-10-CM

## 2025-09-19 DIAGNOSIS — M25.562 PAIN IN RIGHT KNEE: ICD-10-CM

## 2025-09-19 PROCEDURE — 73564 X-RAY EXAM KNEE 4 OR MORE: CPT | Mod: 50

## 2025-09-19 PROCEDURE — 99204 OFFICE O/P NEW MOD 45 MIN: CPT | Mod: 25

## 2025-09-19 PROCEDURE — 20610 DRAIN/INJ JOINT/BURSA W/O US: CPT | Mod: 50

## 2025-09-19 RX ORDER — MELOXICAM 15 MG/1
15 TABLET ORAL
Qty: 30 | Refills: 0 | Status: ACTIVE | COMMUNITY
Start: 2025-09-19 | End: 2025-10-19

## (undated) DEVICE — SUT VICRYL 2-0 27" CT-1 UNDYED

## (undated) DEVICE — TUBING TRUWAVE PRESSURE MALE/FEMALE 72"

## (undated) DEVICE — GLV 8 PROTEXIS

## (undated) DEVICE — TUBING MEDI-VAC W MAXIGRIP CONNECTORS 1/4"X6'

## (undated) DEVICE — CONN REDUCR 3/8 X 1/2

## (undated) DEVICE — DRAPE INSTRUMENT POUCH

## (undated) DEVICE — DRAPE 3/4 SHEET 52X76"

## (undated) DEVICE — Device

## (undated) DEVICE — GLV 7 PROTEXIS

## (undated) DEVICE — SPONGE LAP X-RAY DETECTABLE 18X18"

## (undated) DEVICE — GLV 7 ESTEEM BLUE

## (undated) DEVICE — ELCTR MULTIFUNCTION DEFIBRILLATION ELECTRODE EDGE SYSTEM ADULT

## (undated) DEVICE — PRESSURE INFUSOR BAG 1000ML

## (undated) DEVICE — DRAIN CHANNEL 19FR ROUND FULL FLUTED

## (undated) DEVICE — GOWN XL W TOWEL

## (undated) DEVICE — PERF ST MULT CLR CODE 38CM

## (undated) DEVICE — SUT ETHIBOND 1 30" OS6

## (undated) DEVICE — ADAPTER CARDIOPL VENT Y 19.1CM

## (undated) DEVICE — DRSG TAPE TRANSPORE 3"

## (undated) DEVICE — VESSEL LOOP ASPEN MAXI RED

## (undated) DEVICE — SAW BLADE STRYKER SAGITTAL SAFEDGE 40.5X47.5X0.64

## (undated) DEVICE — PACK ANGIO

## (undated) DEVICE — GLV 7.5 SENSICARE W ALOE

## (undated) DEVICE — SOL IRR POUR NS 0.9% 500ML

## (undated) DEVICE — STOPCOCK 3 WAY W SWIVEL MALE LUER LOCK

## (undated) DEVICE — SPONGE PEANUT AUTO COUNT

## (undated) DEVICE — SET BLOOD Y-TYPE

## (undated) DEVICE — LIGATING CLIPS AESCULAP MEDIUM BLUE 24

## (undated) DEVICE — DRAPE TOWEL BLUE 17" X 24"

## (undated) DEVICE — GLV 6 PROTEXIS

## (undated) DEVICE — MEDICATION LABELS W MARKER

## (undated) DEVICE — GOWN TRIMAX LG

## (undated) DEVICE — DRSG 4X4

## (undated) DEVICE — DRSG TEGADERM 4X4.75"

## (undated) DEVICE — GLV 7.5 PROTEXIS

## (undated) DEVICE — PRESSURE INFUSER BAG 500ML DISP

## (undated) DEVICE — BLANKET WARMER FULL UNDERBODY

## (undated) DEVICE — BLADE STERNUM 31MM X 6.27 X .79

## (undated) DEVICE — SUT MONOCRYL 4-0 27" PS-2 UNDYED

## (undated) DEVICE — GLV 6.5 ESTEEM BLUE

## (undated) DEVICE — SUT SILK 0 30" SH

## (undated) DEVICE — GLV 8.5 PROTEXIS

## (undated) DEVICE — SUCTION YANKAUER NO CONTROL VENT

## (undated) DEVICE — SUT PROLENE 5-0 36" RB-1

## (undated) DEVICE — FOLEY TRAY 16FR 5CC LF LUBRISIL ADVANCE TEMP CLOSED

## (undated) DEVICE — SUT BLUNT SZ 5

## (undated) DEVICE — TUBING IV SET SECOND 34" W/O LOK-BLUNT

## (undated) DEVICE — SOL IRR POUR NS 0.9% 1000ML

## (undated) DEVICE — PACK OPEN HEART VAMP PLUS

## (undated) DEVICE — SUT DOUBLE 6 WIRE STERNAL

## (undated) DEVICE — SUT VICRYL 2 27" TP-1 UNDYED

## (undated) DEVICE — GLV 6.5 PROTEXIS

## (undated) DEVICE — TUBING ALARIS PUMP MODULE NON-DEHP

## (undated) DEVICE — NDL HYPO SAFE 18G X 1.5"

## (undated) DEVICE — PREP CHLORAPREP ORANGE 2PCT 26ML

## (undated) DEVICE — STOPCOCK 3 WAY W TUBE 35"

## (undated) DEVICE — NDL PERC BASEPLT 18GX7CM

## (undated) DEVICE — DRAPE DOME BAG 22"

## (undated) DEVICE — SOL IRR POUR H2O 500ML

## (undated) DEVICE — SPONGE RAYTEC 4X4 16PLY

## (undated) DEVICE — SUT VICRYL 3-0 27" CT-1